# Patient Record
Sex: MALE | Race: WHITE | Employment: OTHER | URBAN - METROPOLITAN AREA
[De-identification: names, ages, dates, MRNs, and addresses within clinical notes are randomized per-mention and may not be internally consistent; named-entity substitution may affect disease eponyms.]

---

## 2017-01-03 ENCOUNTER — TELEPHONE (OUTPATIENT)
Dept: CARDIOLOGY CLINIC | Age: 62
End: 2017-01-03

## 2017-01-05 ENCOUNTER — OFFICE VISIT (OUTPATIENT)
Dept: CARDIOLOGY CLINIC | Age: 62
End: 2017-01-05

## 2017-01-05 ENCOUNTER — HOSPITAL ENCOUNTER (OUTPATIENT)
Dept: GENERAL RADIOLOGY | Age: 62
Discharge: HOME | End: 2017-01-05
Attending: NURSE PRACTITIONER | Admitting: NURSE PRACTITIONER

## 2017-01-05 VITALS
DIASTOLIC BLOOD PRESSURE: 84 MMHG | HEIGHT: 74 IN | WEIGHT: 315 LBS | HEART RATE: 108 BPM | BODY MASS INDEX: 40.43 KG/M2 | SYSTOLIC BLOOD PRESSURE: 112 MMHG

## 2017-01-05 DIAGNOSIS — I48.0 PAROXYSMAL ATRIAL FIBRILLATION (HCC): Primary | ICD-10-CM

## 2017-01-05 DIAGNOSIS — I10 ESSENTIAL HYPERTENSION: ICD-10-CM

## 2017-01-05 DIAGNOSIS — I48.0 PAROXYSMAL ATRIAL FIBRILLATION (HCC): ICD-10-CM

## 2017-01-05 LAB — TSH REFLEX: 0.87 UIU/ML (ref 0.27–4.2)

## 2017-01-05 PROCEDURE — 99214 OFFICE O/P EST MOD 30 MIN: CPT | Performed by: NURSE PRACTITIONER

## 2017-01-05 PROCEDURE — 93000 ELECTROCARDIOGRAM COMPLETE: CPT | Performed by: NURSE PRACTITIONER

## 2017-01-05 RX ORDER — AMIODARONE HYDROCHLORIDE 200 MG/1
200 TABLET ORAL 2 TIMES DAILY
Qty: 60 TABLET | Refills: 1 | Status: SHIPPED | OUTPATIENT
Start: 2017-01-05 | End: 2017-02-23 | Stop reason: DRUGHIGH

## 2017-01-09 ENCOUNTER — TELEPHONE (OUTPATIENT)
Dept: CARDIOLOGY CLINIC | Age: 62
End: 2017-01-09

## 2017-01-10 PROBLEM — I48.4 ATYPICAL ATRIAL FLUTTER (HCC): Status: ACTIVE | Noted: 2017-01-10

## 2017-01-19 DIAGNOSIS — E78.5 HYPERLIPIDEMIA, UNSPECIFIED HYPERLIPIDEMIA TYPE: Primary | ICD-10-CM

## 2017-01-19 RX ORDER — PRAVASTATIN SODIUM 40 MG
TABLET ORAL
Qty: 90 TABLET | Refills: 0 | Status: SHIPPED | OUTPATIENT
Start: 2017-01-19 | End: 2017-05-22 | Stop reason: SDUPTHER

## 2017-01-23 RX ORDER — HYDRALAZINE HYDROCHLORIDE 25 MG/1
TABLET, FILM COATED ORAL
Qty: 180 TABLET | Refills: 0 | Status: SHIPPED | OUTPATIENT
Start: 2017-01-23 | End: 2017-02-27 | Stop reason: SDUPTHER

## 2017-01-23 RX ORDER — FUROSEMIDE 40 MG/1
TABLET ORAL
Qty: 90 TABLET | Refills: 0 | Status: SHIPPED | OUTPATIENT
Start: 2017-01-23 | End: 2017-02-27 | Stop reason: SDUPTHER

## 2017-01-23 RX ORDER — ISOSORBIDE MONONITRATE 30 MG/1
TABLET, EXTENDED RELEASE ORAL
Qty: 90 TABLET | Refills: 0 | Status: SHIPPED | OUTPATIENT
Start: 2017-01-23 | End: 2017-02-27 | Stop reason: SDUPTHER

## 2017-02-15 RX ORDER — CARVEDILOL 6.25 MG/1
TABLET ORAL
Qty: 180 TABLET | Refills: 3 | Status: SHIPPED | OUTPATIENT
Start: 2017-02-15 | End: 2018-02-14 | Stop reason: SDUPTHER

## 2017-02-23 ENCOUNTER — OFFICE VISIT (OUTPATIENT)
Dept: CARDIOLOGY CLINIC | Age: 62
End: 2017-02-23

## 2017-02-23 VITALS
BODY MASS INDEX: 40.43 KG/M2 | HEIGHT: 74 IN | DIASTOLIC BLOOD PRESSURE: 70 MMHG | HEART RATE: 52 BPM | SYSTOLIC BLOOD PRESSURE: 118 MMHG | WEIGHT: 315 LBS | OXYGEN SATURATION: 97 %

## 2017-02-23 DIAGNOSIS — I10 ESSENTIAL HYPERTENSION: ICD-10-CM

## 2017-02-23 DIAGNOSIS — I48.4 ATYPICAL ATRIAL FLUTTER (HCC): ICD-10-CM

## 2017-02-23 DIAGNOSIS — I48.0 PAROXYSMAL ATRIAL FIBRILLATION (HCC): Primary | ICD-10-CM

## 2017-02-23 PROCEDURE — 99213 OFFICE O/P EST LOW 20 MIN: CPT | Performed by: NURSE PRACTITIONER

## 2017-02-23 PROCEDURE — 93000 ELECTROCARDIOGRAM COMPLETE: CPT | Performed by: NURSE PRACTITIONER

## 2017-02-23 RX ORDER — AMIODARONE HYDROCHLORIDE 200 MG/1
200 TABLET ORAL DAILY
Qty: 30 TABLET | Refills: 3 | Status: SHIPPED
Start: 2017-02-23 | End: 2017-02-27 | Stop reason: SDUPTHER

## 2017-02-28 RX ORDER — FUROSEMIDE 40 MG/1
TABLET ORAL
Qty: 90 TABLET | Refills: 1 | Status: SHIPPED | OUTPATIENT
Start: 2017-02-28 | End: 2017-10-15 | Stop reason: SDUPTHER

## 2017-02-28 RX ORDER — AMIODARONE HYDROCHLORIDE 200 MG/1
200 TABLET ORAL DAILY
Qty: 90 TABLET | Refills: 1 | Status: SHIPPED | OUTPATIENT
Start: 2017-02-28 | End: 2017-05-24 | Stop reason: SDUPTHER

## 2017-02-28 RX ORDER — ISOSORBIDE MONONITRATE 30 MG/1
TABLET, EXTENDED RELEASE ORAL
Qty: 90 TABLET | Refills: 1 | Status: SHIPPED | OUTPATIENT
Start: 2017-02-28 | End: 2017-10-15 | Stop reason: SDUPTHER

## 2017-02-28 RX ORDER — HYDRALAZINE HYDROCHLORIDE 25 MG/1
TABLET, FILM COATED ORAL
Qty: 180 TABLET | Refills: 1 | Status: SHIPPED | OUTPATIENT
Start: 2017-02-28 | End: 2017-10-22 | Stop reason: SDUPTHER

## 2017-02-28 RX ORDER — LISINOPRIL 20 MG/1
20 TABLET ORAL 2 TIMES DAILY
Qty: 180 TABLET | Refills: 1 | Status: SHIPPED | OUTPATIENT
Start: 2017-02-28 | End: 2017-07-15 | Stop reason: SDUPTHER

## 2017-05-22 RX ORDER — PRAVASTATIN SODIUM 40 MG
TABLET ORAL
Qty: 90 TABLET | Refills: 3 | Status: SHIPPED | OUTPATIENT
Start: 2017-05-22 | End: 2018-05-27 | Stop reason: SDUPTHER

## 2017-05-24 ENCOUNTER — OFFICE VISIT (OUTPATIENT)
Dept: CARDIOLOGY CLINIC | Age: 62
End: 2017-05-24

## 2017-05-24 VITALS
DIASTOLIC BLOOD PRESSURE: 78 MMHG | WEIGHT: 315 LBS | SYSTOLIC BLOOD PRESSURE: 126 MMHG | BODY MASS INDEX: 40.43 KG/M2 | HEIGHT: 74 IN | HEART RATE: 53 BPM

## 2017-05-24 DIAGNOSIS — I48.0 PAROXYSMAL ATRIAL FIBRILLATION (HCC): Primary | ICD-10-CM

## 2017-05-24 DIAGNOSIS — I10 ESSENTIAL HYPERTENSION: ICD-10-CM

## 2017-05-24 PROCEDURE — 99213 OFFICE O/P EST LOW 20 MIN: CPT | Performed by: NURSE PRACTITIONER

## 2017-05-24 PROCEDURE — 93000 ELECTROCARDIOGRAM COMPLETE: CPT | Performed by: NURSE PRACTITIONER

## 2017-05-24 RX ORDER — AMIODARONE HYDROCHLORIDE 200 MG/1
100 TABLET ORAL DAILY
Qty: 90 TABLET | Refills: 1
Start: 2017-05-24 | End: 2018-04-08 | Stop reason: SDUPTHER

## 2017-07-03 ENCOUNTER — OFFICE VISIT (OUTPATIENT)
Dept: PULMONOLOGY | Age: 62
End: 2017-07-03

## 2017-07-03 VITALS
DIASTOLIC BLOOD PRESSURE: 72 MMHG | HEIGHT: 74 IN | OXYGEN SATURATION: 96 % | BODY MASS INDEX: 40.43 KG/M2 | TEMPERATURE: 98.1 F | SYSTOLIC BLOOD PRESSURE: 112 MMHG | WEIGHT: 315 LBS | RESPIRATION RATE: 20 BRPM | HEART RATE: 62 BPM

## 2017-07-03 DIAGNOSIS — G47.33 SEVERE OBSTRUCTIVE SLEEP APNEA: Primary | ICD-10-CM

## 2017-07-03 DIAGNOSIS — E66.01 OBESITY, CLASS III, BMI 40-49.9 (MORBID OBESITY) (HCC): ICD-10-CM

## 2017-07-03 PROBLEM — E66.813 OBESITY, CLASS III, BMI 40-49.9 (MORBID OBESITY) (HCC): Status: ACTIVE | Noted: 2017-07-03

## 2017-07-03 PROCEDURE — 99213 OFFICE O/P EST LOW 20 MIN: CPT | Performed by: NURSE PRACTITIONER

## 2017-07-03 ASSESSMENT — SLEEP AND FATIGUE QUESTIONNAIRES
ESS TOTAL SCORE: 6
HOW LIKELY ARE YOU TO NOD OFF OR FALL ASLEEP WHEN YOU ARE A PASSENGER IN A CAR FOR AN HOUR WITHOUT A BREAK: 1
HOW LIKELY ARE YOU TO NOD OFF OR FALL ASLEEP WHILE LYING DOWN TO REST IN THE AFTERNOON WHEN CIRCUMSTANCES PERMIT: 1
HOW LIKELY ARE YOU TO NOD OFF OR FALL ASLEEP WHILE SITTING QUIETLY AFTER LUNCH WITHOUT ALCOHOL: 1
HOW LIKELY ARE YOU TO NOD OFF OR FALL ASLEEP IN A CAR, WHILE STOPPED FOR A FEW MINUTES IN TRAFFIC: 0
HOW LIKELY ARE YOU TO NOD OFF OR FALL ASLEEP WHILE SITTING AND TALKING TO SOMEONE: 0
HOW LIKELY ARE YOU TO NOD OFF OR FALL ASLEEP WHILE WATCHING TV: 2
NECK CIRCUMFERENCE (INCHES): 17
HOW LIKELY ARE YOU TO NOD OFF OR FALL ASLEEP WHILE SITTING AND READING: 1
HOW LIKELY ARE YOU TO NOD OFF OR FALL ASLEEP WHILE SITTING INACTIVE IN A PUBLIC PLACE: 0

## 2017-07-10 ENCOUNTER — HOSPITAL ENCOUNTER (OUTPATIENT)
Dept: OTHER | Age: 62
Discharge: HOME | End: 2017-07-10
Attending: NURSE PRACTITIONER | Admitting: NURSE PRACTITIONER

## 2017-07-10 LAB
ALBUMIN SERPL-MCNC: 4.1 G/DL (ref 3.4–5)
ALP BLD-CCNC: 60 U/L (ref 40–129)
ALT SERPL-CCNC: 15 U/L (ref 10–40)
ANION GAP SERPL CALCULATED.3IONS-SCNC: 10 MMOL/L (ref 3–16)
AST SERPL-CCNC: 12 U/L (ref 15–37)
BILIRUB SERPL-MCNC: 0.6 MG/DL (ref 0–1)
BILIRUBIN DIRECT: <0.2 MG/DL (ref 0–0.3)
BILIRUBIN, INDIRECT: ABNORMAL MG/DL (ref 0–1)
BUN BLDV-MCNC: 38 MG/DL (ref 7–20)
CALCIUM SERPL-MCNC: 9.4 MG/DL (ref 8.3–10.6)
CHLORIDE BLD-SCNC: 105 MMOL/L (ref 99–110)
CO2: 26 MMOL/L (ref 21–32)
CREAT SERPL-MCNC: 1.6 MG/DL (ref 0.8–1.3)
GFR AFRICAN AMERICAN: 53
GFR NON-AFRICAN AMERICAN: 44
GLUCOSE BLD-MCNC: 98 MG/DL (ref 70–99)
POTASSIUM SERPL-SCNC: 4.6 MMOL/L (ref 3.5–5.1)
SODIUM BLD-SCNC: 141 MMOL/L (ref 136–145)
TOTAL PROTEIN: 6.7 G/DL (ref 6.4–8.2)
TSH REFLEX: 1.62 UIU/ML (ref 0.27–4.2)

## 2017-11-30 ENCOUNTER — OFFICE VISIT (OUTPATIENT)
Dept: CARDIOLOGY CLINIC | Age: 62
End: 2017-11-30

## 2017-11-30 VITALS
BODY MASS INDEX: 40.43 KG/M2 | HEART RATE: 64 BPM | DIASTOLIC BLOOD PRESSURE: 78 MMHG | HEIGHT: 74 IN | WEIGHT: 315 LBS | SYSTOLIC BLOOD PRESSURE: 124 MMHG

## 2017-11-30 DIAGNOSIS — I48.0 PAROXYSMAL ATRIAL FIBRILLATION (HCC): Primary | ICD-10-CM

## 2017-11-30 PROCEDURE — 93000 ELECTROCARDIOGRAM COMPLETE: CPT | Performed by: INTERNAL MEDICINE

## 2017-11-30 PROCEDURE — 99214 OFFICE O/P EST MOD 30 MIN: CPT | Performed by: INTERNAL MEDICINE

## 2017-11-30 NOTE — LETTER
[address] [place] [address] Cardiology - [address] [place] [id number]  Phone: [phone]  Fax: [phone]    Lyn Sherman MD        December 5, 2017     [address] [place] [address]  [address]. [address]    Patient: Gen Beach  MR Number: [id number]  YOB: 1955  Date of Visit: 11/30/2017    Dear  [address] [place] [address]:    I recently saw our mutual patient, listed above. Below are the relevant portions of my assessment and plan of care. [address]   Electrophysiology Note      Reason for office visit: 6 month follow up; PAF     HISTORY OF PRESENT ILLNESS: Gen Beach is a 57 y.o. male who presents for follow up for Paroxysmal atrial fibrillation. He reported a cardioversion in 2007 for afib. He underwent successful cardioversion on 3/20/15. Repeat EKG on 4/16/15 showed afib. EKG in the office at that time showed afib, rate 80. He complained of palpitations and shortness of breath. He stated that he had sleep apnea. He underwent RFCA for afib on 08/11/15. At his 9/2015 office visit, he reported no irregular heart rhythm since ablation, but reported his AM medications caused fatigue. Medication changes were made at that time and he was instructed to follow up with Roseanna JENKINS.    Started Amiodarone 12/2016. DCCV 1/10/2017. Amiodarone was decreased due to bradycardia. Interval history since last office visit:   EKG from today shows sinus rhythm 63. He reports he has been feeling well. He feels he has maintained sinus rhythm since his latest cardioversion. He has been taking his medications as prescribed. He tolerates daily activity well. Patient currently denies any edema, palpitations, chest pain, shortness of breath, dizziness, and syncope.        Past Medical History:   has a past medical history of Atrial fibrillation ([address] CHF (congestive heart failure) ([address] CKD (chronic kidney disease) Allergies:  Review of patient's allergies indicates no known allergies. Social History:   reports that he has never smoked. He has never used smokeless tobacco. He reports that he does not drink alcohol or use drugs. Family History: family history includes Cancer in his father; Diabetes in his brother and mother; Heart Disease in his mother and sister; High Blood Pressure in his mother; Osteoarthritis in his mother. REVIEW OF SYSTEMS:  Unchanged since last visit   · Constitutional: there has been no unanticipated weight loss. There's been no change in energy level, sleep pattern, or activity level. · Eyes: No visual changes or diplopia. No scleral icterus. · ENT: No Headaches, hearing loss or vertigo. No mouth sores or sore throat. · Cardiovascular: No for chest pain, No for dyspnea on exertion, No for palpitations or No for loss of consciousness. No cough, hemoptysis, No for pleuritic pain, or phlebitis. · Respiratory: No for cough or wheezing, no sputum production. No hematemesis. · Gastrointestinal: No abdominal pain, appetite loss, blood in stools. No change in bowel or bladder habits. · Genitourinary: No dysuria, trouble voiding, or hematuria. · Musculoskeletal:  No gait disturbance, No for weakness or joint complaints. · Integumentary: No rash or pruritis. · Neurological: No headache, diplopia, change in muscle strength, numbness or tingling. No change in gait, balance, coordination, mood, affect, memory, mentation, behavior. · Psychiatric: No anxiety, or depression. · Endocrine: No temperature intolerance. No excessive thirst, fluid intake, or urination. No tremor. · Hematologic/Lymphatic: No abnormal bruising or bleeding, blood clots or swollen lymph nodes. · Allergic/Immunologic: No nasal congestion or hives.     Physical Examination:  Unchanged since last visit   /78   Pulse 64   Ht 6' 2\" (1.88 m)   Wt (!) 336 lb (152.4 kg)   BMI 43.14 kg/m² Constitutional and General Appearance: alert, cooperative, no distress and appears stated age  [de-identified]: PERRL, no cervical lymphadenopathy. No masses palpable. Normal oral mucosa  Respiratory:  · Normal excursion and expansion without use of accessory muscles  · Resp Auscultation: Normal breath sounds without dullness or wheezing  Cardiovascular:  · The apical impulse is not displaced  · Heart tones are crisp and normal. regular S1 and S2.  · Jugular venous pulsation Normal  · The carotid upstroke is normal in amplitude and contour without delay or bruit  · Peripheral pulses are symmetrical and full  Abdomen:  · No masses or tenderness  · Bowel sounds present  Extremities:  ·  No Cyanosis or Clubbing  ·  Lower extremity edema: No  · Skin: Warm and dry  Neurological:  · Alert and oriented. · Moves all extremities well  · No abnormalities of mood, affect, memory, mentation, or behavior are noted      DATA:    EC17 Sinus rhythm 63  RCFA: 2015   CONCLUSION:    1. Complex electrophysiology study with normal baseline conduction parameters. 2. Status post persistent atrial fibrillation ablation. PLAN:    1. Routine post ablation care. 2. Continue current medications. 3. Limited 2D echo in the morning. EC/4/15 Atrial fibrillation, ABNORMAL RHYTHM, rate 80  ECHO: 2/24/15  Technically difficult study due to body habitus. Patient is in atrial fibrillations with controlled ventricular response during this study. Overall left ventricular function is normal.  Ejection fraction is visually estimated to be 55 %. Left ventricle size is normal.  Moderate concentric left ventricular hypertrophy is present. Normal diastolic function. Mitral valve is structurally normal.  Trivial mitral regurgitation is present. The left atrium is normal in size. The aortic valve is normal in structure and function. There is no significant aortic regurgitation. The aortic root is enlarged ( 3.8cm) in size. IVC size is normal (<2.1 cm) and collapses > 50% with respiration consistent with normal RA pressure (3 mmHg). No obvious masses, thrombi, or vegetations are noted. IMPRESSION:    1. Paroxysmal atrial fibrillation (HCC)     2. H/o radiofrequency catheter ablation       RECOMMENDATIONS:  1. Continue current medications. 2. The patient is asked to make an attempt to improve diet and exercise patterns to aid in medical management of this problem. 3. Follow up with Aquiles Bender CNP in 6 months. 4. Follow up with me as needed. QUALITY MEASURES  1. Tobacco Cessation Counseling: NA  2. Retake of BP if >140/90:   NA  3. Documentation to PCP/referring for new patient:  Sent to PCP at close of office visit  4. CAD patient on anti-platelet: NA  5. CAD patient on STATIN therapy:  Pravastatin  6. Patient with CHF and aFib on anticoagulation:  Yes Xarelto      All questions and concerns were addressed to the patient/family. Alternatives to my treatment were discussed. EARL WhiteC. Electrophysiology  [facility]. [address] [place] [address]. [address]. [place], [address]  Phone: [phone]  Fax: [phone]   11/30/2017    If you have questions, please do not hesitate to call me. I look forward to following Rachid Freedman along with you.     Sincerely,        Ivet Zhang MD

## 2017-11-30 NOTE — PROGRESS NOTES
[facility]   Electrophysiology Note      Reason for office visit: 6 month follow up; PAF     HISTORY OF PRESENT ILLNESS: Flynn Jiang is a 57 y.o. male who presents for follow up for Paroxysmal atrial fibrillation. He reported a cardioversion in 2007 for afib. He underwent successful cardioversion on 3/20/15. Repeat EKG on 4/16/15 showed afib. EKG in the office at that time showed afib, rate 80. He complained of palpitations and shortness of breath. He stated that he had sleep apnea. He underwent RFCA for afib on 08/11/15. At his 9/2015 office visit, he reported no irregular heart rhythm since ablation, but reported his AM medications caused fatigue. Medication changes were made at that time and he was instructed to follow up with Roseanna JENKINS.    Started Amiodarone 12/2016. DCCV 1/10/2017. Amiodarone was decreased due to bradycardia. Interval history since last office visit:   EKG from today shows sinus rhythm 63. He reports he has been feeling well. He feels he has maintained sinus rhythm since his latest cardioversion. He has been taking his medications as prescribed. He tolerates daily activity well. Patient currently denies any edema, palpitations, chest pain, shortness of breath, dizziness, and syncope. Past Medical History:   has a past medical history of Atrial fibrillation ([address] CHF (congestive heart failure) ([facility] CKD (chronic kidney disease) stage 3, GFR 30-59 ml/min; Family history of early CAD; Hyperlipidemia; Hypertension; JESSICA (obstructive sleep apnea); and S/P ablation of atrial fibrillation. Past Surgical History:   has a past surgical history that includes back surgery; hernia repair; Knee arthroscopy (3/20/2012); Cardioversion (3/20/2015); transesophageal echocardiogram (8/11/2015); ablation of dysrhythmic focus (8/11/2015); Atrial ablation surgery (8/11/15); Cardioversion (01/10/2017); and transesophageal echocardiogram (01/10/2017).      Home Medications:    Prior to Admission medications    Medication Sig Start Date End Date Taking? Authorizing Provider   amiodarone (CORDARONE) 200 MG tablet Take 0.5 tablets by mouth daily 10/23/17  Yes Willian Melton CNP   hydrALAZINE (APRESOLINE) 25 MG tablet TAKE 1 TABLET BY MOUTH  TWICE A DAY 10/23/17  Yes Willian Melton CNP   furosemide (LASIX) 40 MG tablet TAKE 1 TABLET BY MOUTH  EVERY DAY IN THE MORNING 10/19/17  Yes Mika Burgos MD   isosorbide mononitrate (IMDUR) 30 MG extended release tablet TAKE 1 TABLET BY MOUTH  EVERY DAY 10/19/17  Yes Mika Burgos MD   eplerenone (INSPRA) 25 MG tablet Take 1 tablet by mouth  daily 8/28/17  Yes Mika Burgos MD   lisinopril (PRINIVIL;ZESTRIL) 20 MG tablet Take 1 tablet by mouth two  times daily 7/17/17  Yes Willian Melton CNP   rivaroxaban (XARELTO) 20 MG TABS tablet TAKE 1 TABLET EVERY DAY 7/17/17  Yes Mika Burgos MD   amiodarone (CORDARONE) 200 MG tablet Take 0.5 tablets by mouth daily  Patient taking differently: Take 100 mg by mouth 2 times daily  5/24/17  Yes Willian Melton CNP   pravastatin (PRAVACHOL) 40 MG tablet Take 1 tablet by mouth  daily (Needs labs) 5/22/17  Yes Roseanne Ferrari MD   carvedilol (COREG) 6.25 MG tablet TAKE 1 TABLET BY MOUTH WITH MEALS TWICE A DAY 2/15/17  Yes Roseanne Ferrari MD   XARELTO 20 MG TABS tablet TAKE 1 TABLET EVERY DAY 7/19/17   Willian Melton CNP       Allergies:  Review of patient's allergies indicates no known allergies. Social History:   reports that he has never smoked. He has never used smokeless tobacco. He reports that he does not drink alcohol or use drugs. Family History: family history includes Cancer in his father; Diabetes in his brother and mother; Heart Disease in his mother and sister; High Blood Pressure in his mother; Osteoarthritis in his mother. REVIEW OF SYSTEMS:  Unchanged since last visit   · Constitutional: there has been no unanticipated weight loss.  There's been no change in energy level, sleep pattern, or activity level. · Eyes: No visual changes or diplopia. No scleral icterus. · ENT: No Headaches, hearing loss or vertigo. No mouth sores or sore throat. · Cardiovascular: No for chest pain, No for dyspnea on exertion, No for palpitations or No for loss of consciousness. No cough, hemoptysis, No for pleuritic pain, or phlebitis. · Respiratory: No for cough or wheezing, no sputum production. No hematemesis. · Gastrointestinal: No abdominal pain, appetite loss, blood in stools. No change in bowel or bladder habits. · Genitourinary: No dysuria, trouble voiding, or hematuria. · Musculoskeletal:  No gait disturbance, No for weakness or joint complaints. · Integumentary: No rash or pruritis. · Neurological: No headache, diplopia, change in muscle strength, numbness or tingling. No change in gait, balance, coordination, mood, affect, memory, mentation, behavior. · Psychiatric: No anxiety, or depression. · Endocrine: No temperature intolerance. No excessive thirst, fluid intake, or urination. No tremor. · Hematologic/Lymphatic: No abnormal bruising or bleeding, blood clots or swollen lymph nodes. · Allergic/Immunologic: No nasal congestion or hives. Physical Examination:  Unchanged since last visit   /78   Pulse 64   Ht 6' 2\" (1.88 m)   Wt (!) 336 lb (152.4 kg)   BMI 43.14 kg/m²    Constitutional and General Appearance: alert, cooperative, no distress and appears stated age  HEENT: PERRL, no cervical lymphadenopathy. No masses palpable.  Normal oral mucosa  Respiratory:  · Normal excursion and expansion without use of accessory muscles  · Resp Auscultation: Normal breath sounds without dullness or wheezing  Cardiovascular:  · The apical impulse is not displaced  · Heart tones are crisp and normal. regular S1 and S2.  · Jugular venous pulsation Normal  · The carotid upstroke is normal in amplitude and contour without delay or bruit  · Peripheral pulses are symmetrical and

## 2017-12-05 NOTE — COMMUNICATION BODY
sleep pattern, or activity level. · Eyes: No visual changes or diplopia. No scleral icterus. · ENT: No Headaches, hearing loss or vertigo. No mouth sores or sore throat. · Cardiovascular: No for chest pain, No for dyspnea on exertion, No for palpitations or No for loss of consciousness. No cough, hemoptysis, No for pleuritic pain, or phlebitis. · Respiratory: No for cough or wheezing, no sputum production. No hematemesis. · Gastrointestinal: No abdominal pain, appetite loss, blood in stools. No change in bowel or bladder habits. · Genitourinary: No dysuria, trouble voiding, or hematuria. · Musculoskeletal:  No gait disturbance, No for weakness or joint complaints. · Integumentary: No rash or pruritis. · Neurological: No headache, diplopia, change in muscle strength, numbness or tingling. No change in gait, balance, coordination, mood, affect, memory, mentation, behavior. · Psychiatric: No anxiety, or depression. · Endocrine: No temperature intolerance. No excessive thirst, fluid intake, or urination. No tremor. · Hematologic/Lymphatic: No abnormal bruising or bleeding, blood clots or swollen lymph nodes. · Allergic/Immunologic: No nasal congestion or hives. Physical Examination:  Unchanged since last visit   /78   Pulse 64   Ht 6' 2\" (1.88 m)   Wt (!) 336 lb (152.4 kg)   BMI 43.14 kg/m²     Constitutional and General Appearance: alert, cooperative, no distress and appears stated age  HEENT: PERRL, no cervical lymphadenopathy. No masses palpable.  Normal oral mucosa  Respiratory:  · Normal excursion and expansion without use of accessory muscles  · Resp Auscultation: Normal breath sounds without dullness or wheezing  Cardiovascular:  · The apical impulse is not displaced  · Heart tones are crisp and normal. regular S1 and S2.  · Jugular venous pulsation Normal  · The carotid upstroke is normal in amplitude and contour without delay or bruit  · Peripheral pulses are symmetrical and Retake of BP if >140/90:   NA  3. Documentation to PCP/referring for new patient:  Sent to PCP at close of office visit  4. CAD patient on anti-platelet: NA  5. CAD patient on STATIN therapy:  Pravastatin  6. Patient with CHF and aFib on anticoagulation:  Yes Xarelto      All questions and concerns were addressed to the patient/family. Alternatives to my treatment were discussed. VAN Garcia F.A.C.C. Electrophysiology  [facility]. [address] [place] [address]. [address].   [place] [address]  Phone: [phone]  Fax: [phone]   11/30/2017

## 2018-01-09 DIAGNOSIS — Z79.899 MEDICATION MANAGEMENT: Primary | ICD-10-CM

## 2018-01-09 RX ORDER — ISOSORBIDE MONONITRATE 30 MG/1
TABLET, EXTENDED RELEASE ORAL
Qty: 90 TABLET | Refills: 3 | Status: SHIPPED | OUTPATIENT
Start: 2018-01-09 | End: 2018-11-13 | Stop reason: SDUPTHER

## 2018-01-09 RX ORDER — FUROSEMIDE 40 MG/1
TABLET ORAL
Qty: 90 TABLET | Refills: 1 | Status: SHIPPED | OUTPATIENT
Start: 2018-01-09 | End: 2018-05-30 | Stop reason: SDUPTHER

## 2018-01-31 ENCOUNTER — OFFICE VISIT (OUTPATIENT)
Dept: FAMILY MEDICINE CLINIC | Age: 63
End: 2018-01-31

## 2018-01-31 VITALS
OXYGEN SATURATION: 98 % | HEART RATE: 64 BPM | WEIGHT: 315 LBS | DIASTOLIC BLOOD PRESSURE: 78 MMHG | SYSTOLIC BLOOD PRESSURE: 138 MMHG | BODY MASS INDEX: 40.43 KG/M2 | HEIGHT: 74 IN

## 2018-01-31 DIAGNOSIS — I10 ESSENTIAL HYPERTENSION: Primary | ICD-10-CM

## 2018-01-31 DIAGNOSIS — Z12.11 SCREENING FOR COLON CANCER: ICD-10-CM

## 2018-01-31 DIAGNOSIS — N18.30 CKD (CHRONIC KIDNEY DISEASE) STAGE 3, GFR 30-59 ML/MIN (HCC): ICD-10-CM

## 2018-01-31 DIAGNOSIS — Z11.4 ENCOUNTER FOR SCREENING FOR HIV: ICD-10-CM

## 2018-01-31 DIAGNOSIS — Z11.59 NEED FOR HEPATITIS C SCREENING TEST: ICD-10-CM

## 2018-01-31 DIAGNOSIS — I48.0 PAROXYSMAL ATRIAL FIBRILLATION (HCC): ICD-10-CM

## 2018-01-31 DIAGNOSIS — J30.9 ACUTE ALLERGIC RHINITIS, UNSPECIFIED SEASONALITY, UNSPECIFIED TRIGGER: ICD-10-CM

## 2018-01-31 PROCEDURE — 99214 OFFICE O/P EST MOD 30 MIN: CPT | Performed by: FAMILY MEDICINE

## 2018-01-31 NOTE — PROGRESS NOTES
Darian Obrien is a 57 y.o. male    Chief Complaint   Patient presents with    [facility]     Runny nose. htn, Congestive heart failure, sleep apnea, ckd,       HPI:    This is a new patient to me. Hypertension   This is a chronic problem. The current episode started more than 1 year ago. The problem is unchanged. The problem is controlled. Risk factors for coronary artery disease include male gender and obesity. Past treatments include beta blockers, ACE inhibitors, diuretics and direct vasodilators. The current treatment provides significant improvement. There are no compliance problems. Hypertensive end-organ damage includes kidney disease. There is no history of CAD/MI or CVA. Runny nose. Present for a week. He feels his sinus dripping in the back. Has JESSICA and uses CPAP. Uses distilled water. Atrial fibrillation, paroxysmal. Had a recent cardioversion. Still on Amiodarone, Xarleto. CHF, systolic. This is a chronic condition. Leg edema has improved since his cardioversion/ablation. Takes Lasix and Eplerenone in the morning. CKD. Patient sees Nephrology. 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 Echo CHARLY 1/17:   Summary   Ejection fraction is visually estimated to be 45-50 %.   Trivial mitral and tricuspid regurgitation.   No HARPREET clot   Compared to previous study from 8- ejection fraction remains   unchanged.       ROS:    Review of Systems   HENT: Positive for congestion. Cardiovascular: Negative for leg swelling. /78 (Site: Left Arm, Position: Sitting, Cuff Size: Large Adult)   Pulse 64   Ht 6' 2\" (1.88 m)   Wt (!) 331 lb (150.1 kg)   SpO2 98%   BMI 42.50 kg/m²     Physical Exam:    Physical Exam   Constitutional: He appears well-developed. No distress. HENT:   Nose: Rhinorrhea present. Mouth/Throat: No posterior oropharyngeal edema or posterior oropharyngeal erythema. Neck: Normal range of motion. Neck supple.    Cardiovascular: Normal rate

## 2018-02-02 ENCOUNTER — HOSPITAL ENCOUNTER (OUTPATIENT)
Dept: OTHER | Age: 63
Discharge: HOME | End: 2018-02-02
Attending: INTERNAL MEDICINE | Admitting: INTERNAL MEDICINE

## 2018-02-02 ENCOUNTER — HOSPITAL ENCOUNTER (OUTPATIENT)
Dept: OTHER | Age: 63
Discharge: HOME | End: 2018-02-02
Attending: FAMILY MEDICINE | Admitting: FAMILY MEDICINE

## 2018-02-02 LAB
ANION GAP SERPL CALCULATED.3IONS-SCNC: 10 MMOL/L (ref 3–16)
BUN BLDV-MCNC: 30 MG/DL (ref 7–20)
CALCIUM SERPL-MCNC: 9 MG/DL (ref 8.3–10.6)
CHLORIDE BLD-SCNC: 105 MMOL/L (ref 99–110)
CO2: 27 MMOL/L (ref 21–32)
CREAT SERPL-MCNC: 1.6 MG/DL (ref 0.8–1.3)
GFR AFRICAN AMERICAN: 53
GFR NON-AFRICAN AMERICAN: 44
GLUCOSE BLD-MCNC: 101 MG/DL (ref 70–99)
HEPATITIS C ANTIBODY INTERPRETATION: NORMAL
POTASSIUM SERPL-SCNC: 4.6 MMOL/L (ref 3.5–5.1)
SODIUM BLD-SCNC: 142 MMOL/L (ref 136–145)

## 2018-02-05 LAB
HIV AG/AB: NORMAL
HIV ANTIGEN: NORMAL
HIV-1 ANTIBODY: NORMAL
HIV-2 AB: NORMAL

## 2018-04-03 DIAGNOSIS — Z79.899 MEDICATION MANAGEMENT: Primary | ICD-10-CM

## 2018-04-04 ENCOUNTER — HOSPITAL ENCOUNTER (OUTPATIENT)
Dept: OTHER | Age: 63
Discharge: HOME | End: 2018-04-04
Attending: NURSE PRACTITIONER | Admitting: NURSE PRACTITIONER

## 2018-04-05 LAB
HCT VFR BLD CALC: 41.3 % (ref 40.5–52.5)
HEMOGLOBIN: 13.8 G/DL (ref 13.5–17.5)
MCH RBC QN AUTO: 30.3 PG (ref 26–34)
MCHC RBC AUTO-ENTMCNC: 33.4 G/DL (ref 31–36)
MCV RBC AUTO: 90.6 FL (ref 80–100)
PDW BLD-RTO: 13.6 % (ref 12.4–15.4)
PLATELET # BLD: 211 K/UL (ref 135–450)
PMV BLD AUTO: 8.9 FL (ref 5–10.5)
RBC # BLD: 4.56 M/UL (ref 4.2–5.9)
WBC # BLD: 9.3 K/UL (ref 4–11)

## 2018-04-11 ENCOUNTER — HOSPITAL ENCOUNTER (OUTPATIENT)
Dept: OTHER | Age: 63
Discharge: HOME | End: 2018-04-11
Attending: NURSE PRACTITIONER | Admitting: NURSE PRACTITIONER

## 2018-04-11 LAB
ALBUMIN SERPL-MCNC: 4.1 G/DL (ref 3.4–5)
ALP BLD-CCNC: 59 U/L (ref 40–129)
ALT SERPL-CCNC: 19 U/L (ref 10–40)
ANION GAP SERPL CALCULATED.3IONS-SCNC: 12 MMOL/L (ref 3–16)
AST SERPL-CCNC: 14 U/L (ref 15–37)
BILIRUB SERPL-MCNC: 0.7 MG/DL (ref 0–1)
BILIRUBIN DIRECT: <0.2 MG/DL (ref 0–0.3)
BILIRUBIN, INDIRECT: ABNORMAL MG/DL (ref 0–1)
BUN BLDV-MCNC: 30 MG/DL (ref 7–20)
CALCIUM SERPL-MCNC: 9.1 MG/DL (ref 8.3–10.6)
CHLORIDE BLD-SCNC: 101 MMOL/L (ref 99–110)
CO2: 27 MMOL/L (ref 21–32)
CREAT SERPL-MCNC: 1.4 MG/DL (ref 0.8–1.3)
GFR AFRICAN AMERICAN: >60
GFR NON-AFRICAN AMERICAN: 51
GLUCOSE BLD-MCNC: 98 MG/DL (ref 70–99)
POTASSIUM SERPL-SCNC: 4.5 MMOL/L (ref 3.5–5.1)
SODIUM BLD-SCNC: 140 MMOL/L (ref 136–145)
TOTAL PROTEIN: 6.6 G/DL (ref 6.4–8.2)
TSH REFLEX FT4: 1.18 UIU/ML (ref 0.27–4.2)

## 2018-05-29 RX ORDER — PRAVASTATIN SODIUM 40 MG
TABLET ORAL
Qty: 90 TABLET | Refills: 3 | Status: SHIPPED | OUTPATIENT
Start: 2018-05-29 | End: 2019-07-09 | Stop reason: SDUPTHER

## 2018-05-30 ENCOUNTER — OFFICE VISIT (OUTPATIENT)
Dept: CARDIOLOGY CLINIC | Age: 63
End: 2018-05-30

## 2018-05-30 VITALS
DIASTOLIC BLOOD PRESSURE: 68 MMHG | SYSTOLIC BLOOD PRESSURE: 126 MMHG | HEART RATE: 55 BPM | OXYGEN SATURATION: 96 % | WEIGHT: 315 LBS | HEIGHT: 74 IN | BODY MASS INDEX: 40.43 KG/M2

## 2018-05-30 DIAGNOSIS — I10 ESSENTIAL HYPERTENSION: ICD-10-CM

## 2018-05-30 DIAGNOSIS — I48.0 PAROXYSMAL ATRIAL FIBRILLATION (HCC): Primary | ICD-10-CM

## 2018-05-30 PROCEDURE — 99213 OFFICE O/P EST LOW 20 MIN: CPT | Performed by: NURSE PRACTITIONER

## 2018-05-30 PROCEDURE — 93000 ELECTROCARDIOGRAM COMPLETE: CPT | Performed by: NURSE PRACTITIONER

## 2018-05-30 RX ORDER — FUROSEMIDE 40 MG/1
TABLET ORAL
Qty: 90 TABLET | Refills: 3 | Status: SHIPPED | OUTPATIENT
Start: 2018-05-30 | End: 2019-07-08 | Stop reason: SDUPTHER

## 2018-05-30 RX ORDER — LISINOPRIL 20 MG/1
TABLET ORAL
Qty: 180 TABLET | Refills: 3 | Status: SHIPPED | OUTPATIENT
Start: 2018-05-30 | End: 2019-07-08 | Stop reason: SDUPTHER

## 2018-07-02 ENCOUNTER — OFFICE VISIT (OUTPATIENT)
Dept: PULMONOLOGY | Age: 63
End: 2018-07-02

## 2018-07-02 VITALS
SYSTOLIC BLOOD PRESSURE: 138 MMHG | RESPIRATION RATE: 16 BRPM | DIASTOLIC BLOOD PRESSURE: 66 MMHG | BODY MASS INDEX: 40.43 KG/M2 | WEIGHT: 315 LBS | HEIGHT: 74 IN | HEART RATE: 65 BPM | OXYGEN SATURATION: 98 % | TEMPERATURE: 97.8 F

## 2018-07-02 DIAGNOSIS — E66.01 OBESITY, CLASS III, BMI 40-49.9 (MORBID OBESITY) (HCC): ICD-10-CM

## 2018-07-02 DIAGNOSIS — G47.33 SEVERE OBSTRUCTIVE SLEEP APNEA: Primary | ICD-10-CM

## 2018-07-02 DIAGNOSIS — Z71.89 CPAP USE COUNSELING: ICD-10-CM

## 2018-07-02 PROCEDURE — 99213 OFFICE O/P EST LOW 20 MIN: CPT | Performed by: NURSE PRACTITIONER

## 2018-07-02 ASSESSMENT — SLEEP AND FATIGUE QUESTIONNAIRES
HOW LIKELY ARE YOU TO NOD OFF OR FALL ASLEEP WHILE SITTING AND READING: 1
HOW LIKELY ARE YOU TO NOD OFF OR FALL ASLEEP WHILE SITTING AND READING: 2
HOW LIKELY ARE YOU TO NOD OFF OR FALL ASLEEP WHILE WATCHING TV: 2
HOW LIKELY ARE YOU TO NOD OFF OR FALL ASLEEP WHILE LYING DOWN TO REST IN THE AFTERNOON WHEN CIRCUMSTANCES PERMIT: 1
HOW LIKELY ARE YOU TO NOD OFF OR FALL ASLEEP WHILE SITTING INACTIVE IN A PUBLIC PLACE: 0
HOW LIKELY ARE YOU TO NOD OFF OR FALL ASLEEP IN A CAR, WHILE STOPPED FOR A FEW MINUTES IN TRAFFIC: 0
HOW LIKELY ARE YOU TO NOD OFF OR FALL ASLEEP WHILE SITTING AND TALKING TO SOMEONE: 0
ESS TOTAL SCORE: 13
HOW LIKELY ARE YOU TO NOD OFF OR FALL ASLEEP WHILE SITTING QUIETLY AFTER LUNCH WITHOUT ALCOHOL: 1
HOW LIKELY ARE YOU TO NOD OFF OR FALL ASLEEP WHEN YOU ARE A PASSENGER IN A CAR FOR AN HOUR WITHOUT A BREAK: 3
HOW LIKELY ARE YOU TO NOD OFF OR FALL ASLEEP WHILE WATCHING TV: 2
HOW LIKELY ARE YOU TO NOD OFF OR FALL ASLEEP IN A CAR, WHILE STOPPED FOR A FEW MINUTES IN TRAFFIC: 0
HOW LIKELY ARE YOU TO NOD OFF OR FALL ASLEEP WHILE SITTING QUIETLY AFTER LUNCH WITHOUT ALCOHOL: 2
HOW LIKELY ARE YOU TO NOD OFF OR FALL ASLEEP WHILE SITTING AND TALKING TO SOMEONE: 0
ESS TOTAL SCORE: 6
HOW LIKELY ARE YOU TO NOD OFF OR FALL ASLEEP WHILE SITTING INACTIVE IN A PUBLIC PLACE: 1
NECK CIRCUMFERENCE (INCHES): 17.5
HOW LIKELY ARE YOU TO NOD OFF OR FALL ASLEEP WHEN YOU ARE A PASSENGER IN A CAR FOR AN HOUR WITHOUT A BREAK: 1
HOW LIKELY ARE YOU TO NOD OFF OR FALL ASLEEP WHILE LYING DOWN TO REST IN THE AFTERNOON WHEN CIRCUMSTANCES PERMIT: 3

## 2018-07-02 NOTE — PATIENT INSTRUCTIONS
Please keep all of your future appointments scheduled by [facility] - [facility] Pulmonary office. Sleep Hygiene. .. Tips for better sleep. .. Avoid naps. This will ensure you are sleepy at bedtime. If you have to take a nap, sleep less than 1 hour, before 3 pm.  Sleep only when sleepy; this reduces the time you are awake in bed. Regular exercise is recommended to help you deepen your sleep, but not within 4-6 hours of your bedtime. Timing of exercise is important, aim to exercise early in the morning or early afternoon. A light snack may help you fall asleep. Warm milk and foods high in the amino acid tryptophan, such as bananas, may help you to sleep  Be sure to avoid heavy, spicy or sugary foods 4-6 hours before bedtime and avoid at snack time. Stay away from stimulants such as caffeine and nicotine for at least 4-6 hours before bed. Stimulants can interfere with your ability to fall asleep. Caffeine is found in tea, cola, coffee, cocoa and chocolate and is best avoided at bedtime. Nicotine is found in tobacco products. Avoid alcohol 4-6 hours before bedtime. Alcohol has an immediate sleep-inducing effect, after a few hours when alcohol levels fall there is a stimulant or wake-up effect and will cause fragmented sleep. Sleep rituals are important. Give your body clues it is time to slow down and sleep. Examples include; yoga, deep breathing, listen to relaxing music, a hot bath or a few minutes of reading. Have a fixed bedtime and awakening time, Even on weekends! You will feel better keeping a regular sleep cycle, even if you are retired or not working. Get into your favorite sleep position. If not asleep in 30 minutes, get up and do something boring until you feel sleepy. Remember not to expose yourself to bright lights such as TV, phone or tablet screens. Only use your bed for sleeping. Do not use your bed as an office, workroom or recreation room. Use comfortable bedding. Uncomfortable bedding can prevent good sleep. Ensure your bedroom is quiet and comfortable. A cooler room along with enough blankets to stay warm is recommended. If your room is too noisy, try a white noise machine. If too bright, try black out shades or an eye mask. Dont take worries to bed. Leave worries about work, school etc. behind you when you go to bed. Some people find it helpful to assign a worry period in the evening or late afternoon to write down your worries and get them out of your system. CPAP Equipment Cleaning and Disinfecting Schedule  Equipment Cleaning Frequency Instructions  Disinfecting Frequency   Non-Disposable Filters  Weekly Mild soapy water, Rinse, Air Dry Not Required   Disposable Filters Change as needed  2-4 weeks Do Not Wash Not Required   Hose/tubing Daily Mild soapy water, Rinse, Air Dry Once a week   Mask / Nasal Pillows Daily Mild soapy water, Rinse, Air Dry Once a week   Headgear Weekly Hand wash, Mild soapy water, Rinse, Dry  Not Required   Humidifier Daily Empty water daily  Mild soapy water, Rinse well, Air Dry  Once a week   CPAP Unit As Needed Dust with damp cloth,  No detergents or sprays Not Required         Disinfect (per schedule) with 1 part white vinegar and 3 parts water- soak mask and water chamber for 30 minutes every 1-2 weeks, more often if sick. Allow water/vinegar mixture to run through tubing. Allow all equipment to air dry. Drying Hints:   Always hang tubing away from direct sunlight, as this will cause the tubing to become yellow, brittle and crack over a period of time. DO NOT attach the wet tubing to your CPAP unit to blow-dry it. The moisture from the tubing can drain back into your machine. Moisture in your unit can cause sudden pressure increases or short circuits  DO's and DON'Ts:  - Don't use alcohol-based products to clean your mask, because it can cause the materials to become hard and brittle.    - Don't put headgear in the washer or

## 2018-07-02 NOTE — PROGRESS NOTES
MA Communication: The following orders are received by verbal communication from GABRIELA Jones.     Orders include:  Order faxed to [id number]       1 year fu scheduled 7/1/19

## 2018-07-02 NOTE — PROGRESS NOTES
Subjective:      Patient ID: Bijan Jha is a 70 y.o. male. HPI   Bijan Jha is a 70 y.o. male in office for JESSICA follow up. Patient is using CPAP 7 hrs/night. Using humidifier. No snoring on CPAP. The pressure is well tolerated. The mask is comfortable-nasal mask. No mask leak. No significant daytime sleepiness. No nodding off when driving. No dry nose or throat. No fatigue. Bedtime is 10 pm and rise time is 445 am. Sleep onset is few minutes. Wakes up 1-2 times at night total. 1-2 nocturia. It takes few minutes to fall back a sleep. No naps during the day. No headache in am. No weight gain. 0-1 caffienated beverages during the day. No alcohol. ESS is 6.     Past Medical History:   Diagnosis Date    Atrial fibrillation (HCC)     CHF (congestive heart failure) (HCC)     CKD (chronic kidney disease) stage 3, GFR 30-59 ml/min     Family history of early CAD     Hyperlipidemia     Hypertension     JESSICA (obstructive sleep apnea)     S/P ablation of atrial fibrillation 8/11/2015    RFCA       Past Surgical History:   Procedure Laterality Date    ABLATION OF DYSRHYTHMIC FOCUS  8/11/2015    ATRIAL ABLATION SURGERY  8/11/15    CHARLY/Afib Ablation    BACK SURGERY      CARDIOVERSION  3/20/2015    Atrial Fib to SR    CARDIOVERSION  01/10/2017    A Fib- SR    HERNIA REPAIR      KNEE ARTHROSCOPY  3/20/2012    Left    TRANSESOPHAGEAL ECHOCARDIOGRAM  8/11/2015    TRANSESOPHAGEAL ECHOCARDIOGRAM  01/10/2017       Current Medications:    Current Outpatient Prescriptions:     rivaroxaban (XARELTO) 20 MG TABS tablet, TAKE 1 TABLET EVERY DAY, Disp: 90 tablet, Rfl: 3    furosemide (LASIX) 40 MG tablet, TAKE 1 TABLET BY MOUTH  DAILY IN THE MORNING, Disp: 90 tablet, Rfl: 3    lisinopril (PRINIVIL;ZESTRIL) 20 MG tablet, TAKE 1 TABLET BY MOUTH TWO  TIMES DAILY, Disp: 180 tablet, Rfl: 3    pravastatin (PRAVACHOL) 40 MG tablet, TAKE 1 TABLET BY MOUTH  DAILY, Disp: 90 tablet, Rfl: 3    hydrALAZINE (APRESOLINE) 25 MG tablet, TAKE 1 TABLET BY MOUTH  TWICE A DAY, Disp: 180 tablet, Rfl: 1    amiodarone (CORDARONE) 200 MG tablet, TAKE ONE-HALF TABLET BY  MOUTH DAILY, Disp: 45 tablet, Rfl: 1    carvedilol (COREG) 6.25 MG tablet, TAKE 1 TABLET BY MOUTH  TWICE A DAY WITH MEALS, Disp: 180 tablet, Rfl: 2    isosorbide mononitrate (IMDUR) 30 MG extended release tablet, TAKE 1 TABLET BY MOUTH  EVERY DAY, Disp: 90 tablet, Rfl: 3    eplerenone (INSPRA) 25 MG tablet, TAKE 1 TABLET BY MOUTH  DAILY, Disp: 90 tablet, Rfl: 3      Review of Systems      Objective:   Physical Exam  Blood pressure 138/66, pulse 65, temperature 97.8 °F (36.6 °C), resp. rate 16, height 6' 2\" (1.88 m), weight (!) 333 lb (151 kg), SpO2 98 %.' on RA    Gen: No acute distress. Obese. BMI of 42.75  Eyes: PERRL. No sclera icterus. No conjunctival injection. ENT: No discharge. Pharynx clear. Mallampati class IV. Large tongue   Neck: Trachea midline. No obvious mass. Neck circumference 17.5\"  Resp: No accessory muscle use. No crackles. No wheezes. No rhonchi. CV: Regular rate. Regular rhythm. No murmur or rub. Skin: Warm and dry. M/S: No cyanosis. No obvious joint deformities  Neuro: Awake. Alert. Moves all four extremities. Psych: Oriented x 3. No anxiety. Data:  Split/night PSG on 5/22/15  controlled on CPAP therapy. + PLMS with leg movement index 22.0. CPAP compliance data:  Compliance download report from 6/3/17 to 7/2/17 showed patient is using machine 6:27 hrs/night with 100 % compliance and AHI 0.3 within this time frame. 30/30days with greater than 4 hours of machine use. 90% pressure 12.9 cm H20 on auto CPAP 12-16 cm H2O    Compliance download report from 6/2/18 to 7/1/18 reviewed today by me and showed patient is using machine 6:52 hrs/night with 100% compliance and AHI 0.4 within this time frame. 30/30days with greater than 4 hours of machine use. CPAP 13 cm H20    Assessment:      · Severe JESSICA. CPAP 13 cm H2O. Nasal mask.

## 2018-07-26 ENCOUNTER — TELEPHONE (OUTPATIENT)
Dept: CARDIOLOGY CLINIC | Age: 63
End: 2018-07-26

## 2018-11-13 DIAGNOSIS — I48.0 PAROXYSMAL ATRIAL FIBRILLATION (HCC): Primary | ICD-10-CM

## 2018-11-13 RX ORDER — HYDRALAZINE HYDROCHLORIDE 25 MG/1
TABLET, FILM COATED ORAL
Qty: 180 TABLET | Refills: 0 | Status: SHIPPED | OUTPATIENT
Start: 2018-11-13 | End: 2019-02-05 | Stop reason: SDUPTHER

## 2018-11-13 RX ORDER — EPLERENONE 25 MG/1
TABLET, FILM COATED ORAL
Qty: 90 TABLET | Refills: 0 | Status: SHIPPED | OUTPATIENT
Start: 2018-11-13 | End: 2019-01-20 | Stop reason: SDUPTHER

## 2018-11-13 RX ORDER — CARVEDILOL 6.25 MG/1
TABLET ORAL
Qty: 180 TABLET | Refills: 0 | Status: SHIPPED | OUTPATIENT
Start: 2018-11-13 | End: 2019-01-20 | Stop reason: SDUPTHER

## 2018-11-13 RX ORDER — AMIODARONE HYDROCHLORIDE 200 MG/1
TABLET ORAL
Qty: 45 TABLET | Refills: 0 | Status: SHIPPED | OUTPATIENT
Start: 2018-11-13 | End: 2019-02-05 | Stop reason: SDUPTHER

## 2018-11-13 RX ORDER — ISOSORBIDE MONONITRATE 30 MG/1
TABLET, EXTENDED RELEASE ORAL
Qty: 90 TABLET | Refills: 0 | Status: SHIPPED | OUTPATIENT
Start: 2018-11-13 | End: 2019-04-18 | Stop reason: SDUPTHER

## 2019-02-04 ENCOUNTER — HOSPITAL ENCOUNTER (OUTPATIENT)
Age: 64
Setting detail: SPECIMEN
Discharge: HOME | End: 2019-02-04
Payer: COMMERCIAL

## 2019-02-04 DIAGNOSIS — I48.0 PAROXYSMAL ATRIAL FIBRILLATION (HCC): ICD-10-CM

## 2019-02-04 PROCEDURE — 36415 COLL VENOUS BLD VENIPUNCTURE: CPT

## 2019-02-04 PROCEDURE — 84443 ASSAY THYROID STIM HORMONE: CPT

## 2019-02-04 PROCEDURE — 80076 HEPATIC FUNCTION PANEL: CPT

## 2019-02-04 PROCEDURE — 80048 BASIC METABOLIC PNL TOTAL CA: CPT

## 2019-02-05 ENCOUNTER — TELEPHONE (OUTPATIENT)
Dept: CARDIOLOGY CLINIC | Age: 64
End: 2019-02-05

## 2019-02-05 DIAGNOSIS — R89.9 ABNORMAL LABORATORY TEST: Primary | ICD-10-CM

## 2019-02-05 LAB
ALBUMIN SERPL-MCNC: 4.2 G/DL (ref 3.4–5)
ALP BLD-CCNC: 69 U/L (ref 40–129)
ALT SERPL-CCNC: 20 U/L (ref 10–40)
ANION GAP SERPL CALCULATED.3IONS-SCNC: 13 MMOL/L (ref 3–16)
AST SERPL-CCNC: 14 U/L (ref 15–37)
BILIRUB SERPL-MCNC: 0.5 MG/DL (ref 0–1)
BILIRUBIN DIRECT: <0.2 MG/DL (ref 0–0.3)
BILIRUBIN, INDIRECT: ABNORMAL MG/DL (ref 0–1)
BUN BLDV-MCNC: 30 MG/DL (ref 7–20)
CALCIUM SERPL-MCNC: 9.1 MG/DL (ref 8.3–10.6)
CHLORIDE BLD-SCNC: 103 MMOL/L (ref 99–110)
CO2: 25 MMOL/L (ref 21–32)
CREAT SERPL-MCNC: 1.7 MG/DL (ref 0.8–1.3)
GFR AFRICAN AMERICAN: 49
GFR NON-AFRICAN AMERICAN: 41
GLUCOSE BLD-MCNC: 88 MG/DL (ref 70–99)
POTASSIUM SERPL-SCNC: 4.3 MMOL/L (ref 3.5–5.1)
SODIUM BLD-SCNC: 141 MMOL/L (ref 136–145)
TOTAL PROTEIN: 6.6 G/DL (ref 6.4–8.2)
TSH REFLEX FT4: 1.13 UIU/ML (ref 0.27–4.2)

## 2019-02-05 RX ORDER — AMIODARONE HYDROCHLORIDE 200 MG/1
TABLET ORAL
Qty: 45 TABLET | Refills: 2 | Status: SHIPPED | OUTPATIENT
Start: 2019-02-05 | End: 2019-09-17 | Stop reason: SDUPTHER

## 2019-02-05 RX ORDER — HYDRALAZINE HYDROCHLORIDE 25 MG/1
TABLET, FILM COATED ORAL
Qty: 180 TABLET | Refills: 2 | Status: SHIPPED | OUTPATIENT
Start: 2019-02-05 | End: 2019-09-17 | Stop reason: SDUPTHER

## 2019-02-18 ENCOUNTER — HOSPITAL ENCOUNTER (OUTPATIENT)
Age: 64
Discharge: HOME | End: 2019-02-18
Payer: COMMERCIAL

## 2019-02-18 DIAGNOSIS — N18.30 CKD (CHRONIC KIDNEY DISEASE), STAGE III (HCC): ICD-10-CM

## 2019-02-18 LAB
ALBUMIN SERPL-MCNC: 3.9 G/DL (ref 3.4–5)
ANION GAP SERPL CALCULATED.3IONS-SCNC: 17 MMOL/L (ref 3–16)
BACTERIA: ABNORMAL /HPF
BUN BLDV-MCNC: 31 MG/DL (ref 7–20)
CALCIUM SERPL-MCNC: 9.6 MG/DL (ref 8.3–10.6)
CHLORIDE BLD-SCNC: 102 MMOL/L (ref 99–110)
CO2: 23 MMOL/L (ref 21–32)
CREAT SERPL-MCNC: 1.6 MG/DL (ref 0.8–1.3)
CREATININE URINE: 47.6 MG/DL (ref 39–259)
EPITHELIAL CELLS, UA: ABNORMAL /HPF
GFR AFRICAN AMERICAN: 53
GFR NON-AFRICAN AMERICAN: 43
GLUCOSE BLD-MCNC: 89 MG/DL (ref 70–99)
MICROALBUMIN UR-MCNC: 1.4 MG/DL
MICROALBUMIN/CREAT UR-RTO: 29.4 MG/G (ref 0–30)
PHOSPHORUS: 3.5 MG/DL (ref 2.5–4.9)
POTASSIUM SERPL-SCNC: 4.9 MMOL/L (ref 3.5–5.1)
RBC UA: ABNORMAL /HPF (ref 0–2)
SODIUM BLD-SCNC: 142 MMOL/L (ref 136–145)
WBC UA: ABNORMAL /HPF (ref 0–5)

## 2019-02-18 PROCEDURE — 80069 RENAL FUNCTION PANEL: CPT

## 2019-02-18 PROCEDURE — 82043 UR ALBUMIN QUANTITATIVE: CPT

## 2019-02-18 PROCEDURE — 82570 ASSAY OF URINE CREATININE: CPT

## 2019-02-18 PROCEDURE — 36415 COLL VENOUS BLD VENIPUNCTURE: CPT

## 2019-02-18 PROCEDURE — 81015 MICROSCOPIC EXAM OF URINE: CPT

## 2019-04-01 RX ORDER — EPLERENONE 25 MG/1
TABLET, FILM COATED ORAL
Qty: 90 TABLET | Refills: 1 | Status: SHIPPED | OUTPATIENT
Start: 2019-04-01 | End: 2019-09-06 | Stop reason: SDUPTHER

## 2019-04-25 NOTE — TELEPHONE ENCOUNTER
Medication Refill        Medication needing refilled: Nena    Doseage of the medication: 40 mg    How are you taking this medication (QD, BID, TID, QID, PRN): qd    Patient want a 30 or 90 day supply called in: 80    Which Pharm[facility] are we sending [facility] to[facility]    Pharmacy Phone number:[phone]    Pharmacy Fax number:[phone]    Last ov 5.30.18        Assessment:   1. Symptomatic paroxysmal (formerly persistent) atrial fibrillation:               -s/p RFCA 8/2015 (no known recurrence until 12/2016)              -s/p DCCV on 1/10/2017              -on Xarelto for [id number] score 3 (HTN, age, and CHF)  2. HTN: controlled  3. Chronic diastolic CHF: stable on Coreg, lisinopril, hydralazine, Lasix  4. CKD stage III  5. JESSICA: on CPAP  6. Sinus bradycardia: asymptomatic     Plan:   1. Continue amiodarone, Coreg, and Xarelto   2. Amiodarone labs Q6 months (due 10/2018)  3. Call the office if your HR is consistently less than 50  4.  Follow up in one year

## 2019-04-25 NOTE — TELEPHONE ENCOUNTER
Last OV 5/30/18 NPBB  Next OV 5/30/19 NPBB     2/18/19 BMP  2/4/19 LFT  1/20/17 Lipid    Patient only follows up with EP at this time, last appt was with Dr. Elba Enriquez in 2016. Becky Kimble

## 2019-04-26 RX ORDER — PRAVASTATIN SODIUM 40 MG
TABLET ORAL
Qty: 90 TABLET | Refills: 0 | OUTPATIENT
Start: 2019-04-26

## 2019-05-29 NOTE — PROGRESS NOTES
[place] [id number]     Electrophysiology Note               HISTORY OF PRESENT ILLNESS:   Shubham Iyer is a 75 y.o. male with a history of atrial fibrillation, sinus bradycardia, HTN, CHF, HLD, JESSICA, and CKD. He reported he had a cardioversion in 2007 for afib. He underwent successful cardioversion on 3/20/15. Repeat EKG on 4/16/15 showed afib. He complained of palpitations and shortness of breath. On 8/11/2015 he had an EP study and a radiofrequency catheter ablation of persistent atrial fibrillation. He had maintained sinus rhythm for over one year and his amiodarone was discontinued in 10/2016. Had recurrent afib in 12/2016 and amiodarone was restarted. He had a cardioversion on 1/10/2017. Amiodarone was decreased due to sinus bradycardia at subsequent visits. Was referred to nephrology in 2/2019 for CKD and amiodarone use. Today he is being seen for paroxysmal atrial fibrillation. EKG shows sinus rubén with a HR of 56. He complains of leg cramps, occurring daily. States hematuria resolved. Home HR averaging 50-60. He denies chest pain, palpitations, shortness of breath, and dizziness. Past Medical History:   has a past medical history of Atrial fibrillation ([address] CHF (congestive heart failure) ([address] CKD (chronic kidney disease) stage 3, GFR 30-59 ml/min ([facility] [address] Family history of early CAD, Hyperlipidemia, Hypertension, JESSICA (obstructive sleep apnea), and S/P ablation of atrial fibrillation. Past Surgical History:   has a past surgical history that includes back surgery; hernia repair; Knee arthroscopy (3/20/2012); Cardioversion (3/20/2015); transesophageal echocardiogram (8/11/2015); ablation of dysrhythmic focus (8/11/2015); Atrial ablation surgery (8/11/15); Cardioversion (01/10/2017); and transesophageal echocardiogram (01/10/2017). Home Medications:    Prior to Admission medications    Medication Sig Start Date End Date Taking?  Authorizing Provider   rivaroxaban (XARELTO) 20 MG TABS tablet TAKE 1 TABLET BY MOUTH  EVERY DAY 4/19/19  Yes Noa Plascencia MD   isosorbide mononitrate (IMDUR) 30 MG extended release tablet TAKE 1 TABLET BY MOUTH  EVERY DAY 4/19/19  Yes Noa Plascencia MD   eplerenone (INSPRA) 25 MG tablet TAKE 1 TABLET BY MOUTH  DAILY 4/1/19  Yes WESTON Wick CNP   amiodarone (CORDARONE) 200 MG tablet TAKE ONE-HALF TABLET BY  MOUTH DAILY  Patient taking differently: Take 100 mg by mouth 2 times daily TAKE ONE-HALF TABLET BY  MOUTH DAILY 2/5/19  Yes WESTON Wick - CNP   hydrALAZINE (APRESOLINE) 25 MG tablet TAKE 1 TABLET BY MOUTH  TWICE A DAY 2/5/19  Yes WESTON Wick - CNP   carvedilol (COREG) 6.25 MG tablet TAKE 1 TABLET BY MOUTH  TWICE A DAY WITH MEALS 1/21/19  Yes WESTON Wick - CNP   furosemide (LASIX) 40 MG tablet TAKE 1 TABLET BY MOUTH  DAILY IN THE MORNING 5/30/18  Yes WESTON Wick - CNP   lisinopril (PRINIVIL;ZESTRIL) 20 MG tablet TAKE 1 TABLET BY MOUTH TWO  TIMES DAILY 5/30/18  Yes WESTON Wick - CNP   pravastatin (PRAVACHOL) 40 MG tablet TAKE 1 TABLET BY MOUTH  DAILY 5/29/18  Yes Slick Barclay MD       Allergies:  Patient has no known allergies. Social History:   reports that he has never smoked. He has never used smokeless tobacco. He reports that he does not drink alcohol or use drugs. Family History: family history includes Cancer in his father; Diabetes in his brother and mother; Heart Disease in his mother and sister; High Blood Pressure in his mother; Osteoarthritis in his mother. Review of Systems   Constitutional: Negative. HENT: Negative. Eyes: Negative. Respiratory: Negative. Cardiovascular: see HPI  Gastrointestinal: Negative. Genitourinary: Negative. Musculoskeletal: + knee pain and leg cramps  Skin: Negative. Neurological: Negative. Hematological: Negative.     Psychiatric/Behavioral: Negative     PHYSICAL EXAM:      /80   Pulse 63   Ht 6' 2\" (1.88 m) Wt (!) 343 lb (155.6 kg)   SpO2 97%   BMI 44.04 kg/m²      Constitutional and General Appearance: alert, cooperative, no distress and appears stated age  [de-identified]: PERRL, no cervical lymphadenopathy. No masses palpable. Normal oral mucosa  Respiratory:  · Normal excursion and expansion without use of accessory muscles  · Resp Auscultation: Normal breath sounds without dullness or wheezing  Cardiovascular:  · The apical impulse is not displaced  · Heart tones are regular  · Jugular venous pulsation Normal  · The carotid upstroke is normal in amplitude and contour without delay or bruit  · Peripheral pulses are symmetrical and full  Abdomen:  · No masses or tenderness  · Bowel sounds present  Extremities:  ·  No cyanosis or clubbing  ·  No BLE edema  · Skin: Warm and dry  Neurological:  · Alert and oriented. · Moves all extremities well  · No abnormalities of mood, affect, memory, mentation, or behavior are noted  · Exhibits normal gait, balance, and coordination. DATA:    ECG 5/30/2019:  Sinus rubén HR 56    CHARLY 1/10/2017:  Ejection fraction is visually estimated to be 45-50 %.   Trivial mitral and tricuspid regurgitation.   No HARPREET clot   Compared to previous study from 8- ejection fraction remains unchanged. CHARLY 8/11/2015:  Ejection fraction is visually estimated to be 45-50 %. Trivial mitral regurgitation is present. The left atrial size is normal.  No evidence of patent foramen ovale. There is no evidence of mass or thrombus in the left atrium or appendage. Stress 2/25/15  Impression:   1. Small zone of anteroseptal reversibility, for which mild   ischemia should be considered in the appropriate clinical setting. No large areas of reversibility are seen. 2. Left ventricular ejection fraction is approximately 43%. Assessment:   1.  Symptomatic paroxysmal (formerly persistent) atrial fibrillation: stable   -s/p RFCA 8/2015 (no known recurrence until 12/2016)   -s/p DCCV on 1/10/2017   -[id number] score 3 (HTN, age, and CHF)  2. Sinus bradycardia: stable and asymptomatic  3. HTN: controlled  4. Chronic diastolic CHF:  compensated  5. CKD stage III: Followed by Dr. Gisele Anguiano  6. JESSICA: on CPAP, compliant   7. Legs cramps: new problem    Plan:   1. Continue amiodarone, Coreg, Inspra, Lasix, hydralazine, Imdur, lisinopril and Xarelto  2. TSH, BMP, and LFT every six months while on amiodarone (due 8/2019)  3. Call the office if your HR is consistently less than 50  4. Will order arterial doppler if electrolytes are normal and leg cramps persist  5.  Follow up in one year     Adria Horton, [address]  [phone]

## 2019-05-30 ENCOUNTER — OFFICE VISIT (OUTPATIENT)
Dept: CARDIOLOGY CLINIC | Age: 64
End: 2019-05-30
Payer: COMMERCIAL

## 2019-05-30 ENCOUNTER — HOSPITAL ENCOUNTER (OUTPATIENT)
Age: 64
Discharge: HOME | End: 2019-05-30
Payer: COMMERCIAL

## 2019-05-30 VITALS
SYSTOLIC BLOOD PRESSURE: 136 MMHG | WEIGHT: 315 LBS | HEART RATE: 63 BPM | HEIGHT: 74 IN | BODY MASS INDEX: 40.43 KG/M2 | DIASTOLIC BLOOD PRESSURE: 80 MMHG | OXYGEN SATURATION: 97 %

## 2019-05-30 DIAGNOSIS — I50.32 CHRONIC DIASTOLIC CONGESTIVE HEART FAILURE (HCC): ICD-10-CM

## 2019-05-30 DIAGNOSIS — N18.30 CKD (CHRONIC KIDNEY DISEASE), STAGE III (HCC): ICD-10-CM

## 2019-05-30 DIAGNOSIS — I48.4 ATYPICAL ATRIAL FLUTTER (HCC): ICD-10-CM

## 2019-05-30 DIAGNOSIS — I48.0 PAROXYSMAL ATRIAL FIBRILLATION (HCC): ICD-10-CM

## 2019-05-30 DIAGNOSIS — I48.0 PAROXYSMAL ATRIAL FIBRILLATION (HCC): Primary | ICD-10-CM

## 2019-05-30 DIAGNOSIS — I10 ESSENTIAL HYPERTENSION: ICD-10-CM

## 2019-05-30 LAB
ALBUMIN SERPL-MCNC: 4.2 G/DL (ref 3.4–5)
ANION GAP SERPL CALCULATED.3IONS-SCNC: 12 MMOL/L (ref 3–16)
BACTERIA: ABNORMAL /HPF
BUN BLDV-MCNC: 30 MG/DL (ref 7–20)
CALCIUM SERPL-MCNC: 9.7 MG/DL (ref 8.3–10.6)
CHLORIDE BLD-SCNC: 103 MMOL/L (ref 99–110)
CO2: 25 MMOL/L (ref 21–32)
CREAT SERPL-MCNC: 1.4 MG/DL (ref 0.8–1.3)
EPITHELIAL CELLS, UA: ABNORMAL /HPF
GFR AFRICAN AMERICAN: >60
GFR NON-AFRICAN AMERICAN: 51
GLUCOSE BLD-MCNC: 90 MG/DL (ref 70–99)
MAGNESIUM: 2.1 MG/DL (ref 1.8–2.4)
PHOSPHORUS: 3.4 MG/DL (ref 2.5–4.9)
POTASSIUM SERPL-SCNC: 4.5 MMOL/L (ref 3.5–5.1)
RBC UA: ABNORMAL /HPF (ref 0–2)
SODIUM BLD-SCNC: 140 MMOL/L (ref 136–145)
WBC UA: ABNORMAL /HPF (ref 0–5)

## 2019-05-30 PROCEDURE — 81015 MICROSCOPIC EXAM OF URINE: CPT

## 2019-05-30 PROCEDURE — 36415 COLL VENOUS BLD VENIPUNCTURE: CPT

## 2019-05-30 PROCEDURE — 80069 RENAL FUNCTION PANEL: CPT

## 2019-05-30 PROCEDURE — 99214 OFFICE O/P EST MOD 30 MIN: CPT | Performed by: NURSE PRACTITIONER

## 2019-05-30 PROCEDURE — 93000 ELECTROCARDIOGRAM COMPLETE: CPT | Performed by: NURSE PRACTITIONER

## 2019-05-30 PROCEDURE — 83735 ASSAY OF MAGNESIUM: CPT

## 2019-05-30 NOTE — LETTER
[place] [id number]     Electrophysiology Note               HISTORY OF PRESENT ILLNESS:   Hedwig Dakin is a 75 y.o. male with a history of atrial fibrillation, sinus bradycardia, HTN, CHF, HLD, JESSICA, and CKD. He reported he had a cardioversion in 2007 for afib. He underwent successful cardioversion on 3/20/15. Repeat EKG on 4/16/15 showed afib. He complained of palpitations and shortness of breath. On 8/11/2015 he had an EP study and a radiofrequency catheter ablation of persistent atrial fibrillation. He had maintained sinus rhythm for over one year and his amiodarone was discontinued in 10/2016. Had recurrent afib in 12/2016 and amiodarone was restarted. He had a cardioversion on 1/10/2017. Amiodarone was decreased due to sinus bradycardia at subsequent visits. Was referred to nephrology in 2/2019 for CKD and amiodarone use. Today he is being seen for paroxysmal atrial fibrillation. EKG shows sinus rubén with a HR of 56. He complains of leg cramps, occurring daily. States hematuria resolved. Home HR averaging 50-60. He denies chest pain, palpitations, shortness of breath, and dizziness. Past Medical History:   has a past medical history of Atrial fibrillation ([address] CHF (congestive heart failure) ([address] CKD (chronic kidney disease) stage 3, GFR 30-59 ml/min ([address] Family history of early CAD, Hyperlipidemia, Hypertension, JESSICA (obstructive sleep apnea), and S/P ablation of atrial fibrillation. Past Surgical History:   has a past surgical history that includes back surgery; hernia repair; Knee arthroscopy (3/20/2012); Cardioversion (3/20/2015); transesophageal echocardiogram (8/11/2015); ablation of dysrhythmic focus (8/11/2015); Atrial ablation surgery (8/11/15); Cardioversion (01/10/2017); and transesophageal echocardiogram (01/10/2017). Home Medications:    Prior to Admission medications    Medication Sig Start Date End Date Taking?  Authorizing Provider rivaroxaban (XARELTO) 20 MG TABS tablet TAKE 1 TABLET BY MOUTH  EVERY DAY 4/19/19  Yes Amrita Lomeli MD   isosorbide mononitrate (IMDUR) 30 MG extended release tablet TAKE 1 TABLET BY MOUTH  EVERY DAY 4/19/19  Yes Amrita Lomeli MD   eplerenone (INSPRA) 25 MG tablet TAKE 1 TABLET BY MOUTH  DAILY 4/1/19  Yes WESTON Prakash CNP   amiodarone (CORDARONE) 200 MG tablet TAKE ONE-HALF TABLET BY  MOUTH DAILY  Patient taking differently: Take 100 mg by mouth 2 times daily TAKE ONE-HALF TABLET BY  MOUTH DAILY 2/5/19  Yes WESTON Prakash CNP   hydrALAZINE (APRESOLINE) 25 MG tablet TAKE 1 TABLET BY MOUTH  TWICE A DAY 2/5/19  Yes WESTON Prakash CNP   carvedilol (COREG) 6.25 MG tablet TAKE 1 TABLET BY MOUTH  TWICE A DAY WITH MEALS 1/21/19  Yes WESTON Prakash CNP   furosemide (LASIX) 40 MG tablet TAKE 1 TABLET BY MOUTH  DAILY IN THE MORNING 5/30/18  Yes WESTON Prakash CNP   lisinopril (PRINIVIL;ZESTRIL) 20 MG tablet TAKE 1 TABLET BY MOUTH TWO  TIMES DAILY 5/30/18  Yes WESTON Prakash CNP   pravastatin (PRAVACHOL) 40 MG tablet TAKE 1 TABLET BY MOUTH  DAILY 5/29/18  Yes Driss Medina MD       Allergies:  Patient has no known allergies. Social History:   reports that he has never smoked. He has never used smokeless tobacco. He reports that he does not drink alcohol or use drugs. Family History: family history includes Cancer in his father; Diabetes in his brother and mother; Heart Disease in his mother and sister; High Blood Pressure in his mother; Osteoarthritis in his mother. Review of Systems   Constitutional: Negative. HENT: Negative. Eyes: Negative. Respiratory: Negative. Cardiovascular: see HPI  Gastrointestinal: Negative. Genitourinary: Negative. Musculoskeletal: + knee pain and leg cramps  Skin: Negative. Neurological: Negative. Hematological: Negative.     Psychiatric/Behavioral: Negative     PHYSICAL EXAM: /80   Pulse 63   Ht 6' 2\" (1.88 m)   Wt (!) 343 lb (155.6 kg)   SpO2 97%   BMI 44.04 kg/m²       Constitutional and General Appearance: alert, cooperative, no distress and appears stated age  [de-identified]: PERRL, no cervical lymphadenopathy. No masses palpable. Normal oral mucosa  Respiratory:  · Normal excursion and expansion without use of accessory muscles  · Resp Auscultation: Normal breath sounds without dullness or wheezing  Cardiovascular:  · The apical impulse is not displaced  · Heart tones are regular  · Jugular venous pulsation Normal  · The carotid upstroke is normal in amplitude and contour without delay or bruit  · Peripheral pulses are symmetrical and full  Abdomen:  · No masses or tenderness  · Bowel sounds present  Extremities:  ·  No cyanosis or clubbing  ·  No BLE edema  · Skin: Warm and dry  Neurological:  · Alert and oriented. · Moves all extremities well  · No abnormalities of mood, affect, memory, mentation, or behavior are noted  · Exhibits normal gait, balance, and coordination. DATA:    ECG 5/30/2019:  Sinus rubén HR 56    CHARLY 1/10/2017:  Ejection fraction is visually estimated to be 45-50 %.   Trivial mitral and tricuspid regurgitation.   No HARPREET clot   Compared to previous study from 8- ejection fraction remains unchanged. CHARLY 8/11/2015:  Ejection fraction is visually estimated to be 45-50 %. Trivial mitral regurgitation is present. The left atrial size is normal.  No evidence of patent foramen ovale. There is no evidence of mass or thrombus in the left atrium or appendage. Stress 2/25/15  Impression:   1. Small zone of anteroseptal reversibility, for which mild   ischemia should be considered in the appropriate clinical setting. No large areas of reversibility are seen. 2. Left ventricular ejection fraction is approximately 43%. Assessment:   1.  Symptomatic paroxysmal (formerly persistent) atrial fibrillation: stable

## 2019-05-30 NOTE — PATIENT INSTRUCTIONS
No changes today   Check potassium and magnesium levels  If blood tests are OK, can drink a Powerade Zero or Propel every day  If leg cramps continue, will order an ultrasound of legs  Follow up in one year

## 2019-07-01 ENCOUNTER — OFFICE VISIT (OUTPATIENT)
Dept: PULMONOLOGY | Age: 64
End: 2019-07-01
Payer: COMMERCIAL

## 2019-07-01 VITALS
RESPIRATION RATE: 20 BRPM | HEIGHT: 74 IN | WEIGHT: 315 LBS | SYSTOLIC BLOOD PRESSURE: 135 MMHG | DIASTOLIC BLOOD PRESSURE: 78 MMHG | OXYGEN SATURATION: 98 % | BODY MASS INDEX: 40.43 KG/M2 | HEART RATE: 75 BPM

## 2019-07-01 DIAGNOSIS — G47.33 SEVERE OBSTRUCTIVE SLEEP APNEA: Primary | ICD-10-CM

## 2019-07-01 DIAGNOSIS — Z71.89 CPAP USE COUNSELING: ICD-10-CM

## 2019-07-01 DIAGNOSIS — E66.01 OBESITY, CLASS III, BMI 40-49.9 (MORBID OBESITY) (HCC): ICD-10-CM

## 2019-07-01 PROCEDURE — 99213 OFFICE O/P EST LOW 20 MIN: CPT | Performed by: NURSE PRACTITIONER

## 2019-07-01 ASSESSMENT — SLEEP AND FATIGUE QUESTIONNAIRES
ESS TOTAL SCORE: 4
HOW LIKELY ARE YOU TO NOD OFF OR FALL ASLEEP WHILE SITTING INACTIVE IN A PUBLIC PLACE: 0
HOW LIKELY ARE YOU TO NOD OFF OR FALL ASLEEP WHILE LYING DOWN TO REST IN THE AFTERNOON WHEN CIRCUMSTANCES PERMIT: 0
NECK CIRCUMFERENCE (INCHES): 17.5
HOW LIKELY ARE YOU TO NOD OFF OR FALL ASLEEP WHEN YOU ARE A PASSENGER IN A CAR FOR AN HOUR WITHOUT A BREAK: 0
HOW LIKELY ARE YOU TO NOD OFF OR FALL ASLEEP IN A CAR, WHILE STOPPED FOR A FEW MINUTES IN TRAFFIC: 0
HOW LIKELY ARE YOU TO NOD OFF OR FALL ASLEEP WHILE SITTING AND READING: 1
HOW LIKELY ARE YOU TO NOD OFF OR FALL ASLEEP WHILE SITTING QUIETLY AFTER LUNCH WITHOUT ALCOHOL: 2
HOW LIKELY ARE YOU TO NOD OFF OR FALL ASLEEP WHILE WATCHING TV: 1
HOW LIKELY ARE YOU TO NOD OFF OR FALL ASLEEP WHILE SITTING AND TALKING TO SOMEONE: 0

## 2019-07-01 NOTE — PROGRESS NOTES
taking differently: Take 100 mg by mouth 2 times daily TAKE ONE-HALF TABLET BY  MOUTH DAILY), Disp: 45 tablet, Rfl: 2    hydrALAZINE (APRESOLINE) 25 MG tablet, TAKE 1 TABLET BY MOUTH  TWICE A DAY, Disp: 180 tablet, Rfl: 2    carvedilol (COREG) 6.25 MG tablet, TAKE 1 TABLET BY MOUTH  TWICE A DAY WITH MEALS, Disp: 180 tablet, Rfl: 3    furosemide (LASIX) 40 MG tablet, TAKE 1 TABLET BY MOUTH  DAILY IN THE MORNING, Disp: 90 tablet, Rfl: 3    lisinopril (PRINIVIL;ZESTRIL) 20 MG tablet, TAKE 1 TABLET BY MOUTH TWO  TIMES DAILY, Disp: 180 tablet, Rfl: 3    pravastatin (PRAVACHOL) 40 MG tablet, TAKE 1 TABLET BY MOUTH  DAILY, Disp: 90 tablet, Rfl: 3      Review of Systems      Objective:   Physical Exam  Blood pressure 135/78, pulse 75, resp. rate 20, height 6' 2\" (1.88 m), weight (!) 341 lb (154.7 kg), SpO2 98 %.' on RA    Gen: No acute distress. Obese. BMI of 43.78  Eyes: PERRL. No sclera icterus. No conjunctival injection. ENT: No discharge. Pharynx clear. Mallampati class IV. Large tongue   Neck: Trachea midline. No obvious mass. Neck circumference 17.5\"  Resp: No accessory muscle use. No crackles. No wheezes. No rhonchi. CV: Regular rate. Regular rhythm. No murmur or rub. Skin: Warm and dry. M/S: No cyanosis. No obvious joint deformities  Neuro: Awake. Alert. Moves all four extremities. Psych: Oriented x 3. No anxiety. Data:  Split/night PSG on 5/22/15  controlled on CPAP therapy. + PLMS with leg movement index 22.0. CPAP compliance data:  Compliance download report from 6/3/17 to 7/2/17 showed patient is using machine 6:27 hrs/night with 100 % compliance and AHI 0.3 within this time frame. 30/30days with greater than 4 hours of machine use. 90% pressure 12.9 cm H20 on auto CPAP 12-16 cm H2O    Compliance download report from 6/2/18 to 7/1/18 showed patient is using machine 6:52 hrs/night with 100% compliance and AHI 0.4 within this time frame.   30/30days with greater than 4 hours of

## 2019-07-08 RX ORDER — LISINOPRIL 20 MG/1
TABLET ORAL
Qty: 180 TABLET | Refills: 3 | OUTPATIENT
Start: 2019-07-08

## 2019-07-08 RX ORDER — FUROSEMIDE 40 MG/1
TABLET ORAL
Qty: 90 TABLET | Refills: 3 | Status: SHIPPED | OUTPATIENT
Start: 2019-07-08 | End: 2020-09-08 | Stop reason: SDUPTHER

## 2019-07-08 RX ORDER — FUROSEMIDE 40 MG/1
TABLET ORAL
Qty: 90 TABLET | Refills: 3 | OUTPATIENT
Start: 2019-07-08

## 2019-07-08 RX ORDER — LISINOPRIL 20 MG/1
TABLET ORAL
Qty: 180 TABLET | Refills: 3 | Status: SHIPPED | OUTPATIENT
Start: 2019-07-08 | End: 2020-08-05 | Stop reason: SDUPTHER

## 2019-07-09 RX ORDER — PRAVASTATIN SODIUM 40 MG
TABLET ORAL
Qty: 30 TABLET | Refills: 1 | Status: SHIPPED | OUTPATIENT
Start: 2019-07-09 | End: 2020-08-05 | Stop reason: SDUPTHER

## 2019-07-12 ENCOUNTER — HOSPITAL ENCOUNTER (EMERGENCY)
Age: 64
Discharge: HOME | End: 2019-07-12
Attending: EMERGENCY MEDICINE
Payer: COMMERCIAL

## 2019-07-12 ENCOUNTER — APPOINTMENT (OUTPATIENT)
Dept: GENERAL RADIOLOGY | Age: 64
End: 2019-07-12
Payer: COMMERCIAL

## 2019-07-12 VITALS
OXYGEN SATURATION: 96 % | RESPIRATION RATE: 16 BRPM | HEART RATE: 85 BPM | BODY MASS INDEX: 40.43 KG/M2 | TEMPERATURE: 98.6 F | SYSTOLIC BLOOD PRESSURE: 130 MMHG | HEIGHT: 74 IN | WEIGHT: 315 LBS | DIASTOLIC BLOOD PRESSURE: 80 MMHG

## 2019-07-12 DIAGNOSIS — I48.0 PAROXYSMAL ATRIAL FIBRILLATION (HCC): Primary | ICD-10-CM

## 2019-07-12 LAB
A/G RATIO: 1.5 (ref 1.1–2.2)
ALBUMIN SERPL-MCNC: 4.1 G/DL (ref 3.4–5)
ALP BLD-CCNC: 71 U/L (ref 40–129)
ALT SERPL-CCNC: 19 U/L (ref 10–40)
ANION GAP SERPL CALCULATED.3IONS-SCNC: 11 MMOL/L (ref 3–16)
AST SERPL-CCNC: 13 U/L (ref 15–37)
BASOPHILS ABSOLUTE: 0.1 K/UL (ref 0–0.2)
BASOPHILS RELATIVE PERCENT: 1 %
BILIRUB SERPL-MCNC: 0.4 MG/DL (ref 0–1)
BUN BLDV-MCNC: 39 MG/DL (ref 7–20)
CALCIUM SERPL-MCNC: 9.6 MG/DL (ref 8.3–10.6)
CHLORIDE BLD-SCNC: 104 MMOL/L (ref 99–110)
CO2: 22 MMOL/L (ref 21–32)
CREAT SERPL-MCNC: 1.6 MG/DL (ref 0.8–1.3)
EOSINOPHILS ABSOLUTE: 0.1 K/UL (ref 0–0.6)
EOSINOPHILS RELATIVE PERCENT: 1.7 %
GFR AFRICAN AMERICAN: 53
GFR NON-AFRICAN AMERICAN: 43
GLOBULIN: 2.7 G/DL
GLUCOSE BLD-MCNC: 134 MG/DL (ref 70–99)
HCT VFR BLD CALC: 44.3 % (ref 40.5–52.5)
HEMOGLOBIN: 14.9 G/DL (ref 13.5–17.5)
LYMPHOCYTES ABSOLUTE: 1 K/UL (ref 1–5.1)
LYMPHOCYTES RELATIVE PERCENT: 13.9 %
MCH RBC QN AUTO: 30 PG (ref 26–34)
MCHC RBC AUTO-ENTMCNC: 33.6 G/DL (ref 31–36)
MCV RBC AUTO: 89.2 FL (ref 80–100)
MONOCYTES ABSOLUTE: 0.6 K/UL (ref 0–1.3)
MONOCYTES RELATIVE PERCENT: 7.6 %
NEUTROPHILS ABSOLUTE: 5.6 K/UL (ref 1.7–7.7)
NEUTROPHILS RELATIVE PERCENT: 75.8 %
PDW BLD-RTO: 13.7 % (ref 12.4–15.4)
PLATELET # BLD: 210 K/UL (ref 135–450)
PMV BLD AUTO: 8.1 FL (ref 5–10.5)
POTASSIUM SERPL-SCNC: 4.1 MMOL/L (ref 3.5–5.1)
RBC # BLD: 4.96 M/UL (ref 4.2–5.9)
SODIUM BLD-SCNC: 137 MMOL/L (ref 136–145)
TOTAL PROTEIN: 6.8 G/DL (ref 6.4–8.2)
TROPONIN: <0.01 NG/ML
WBC # BLD: 7.4 K/UL (ref 4–11)

## 2019-07-12 PROCEDURE — 80053 COMPREHEN METABOLIC PANEL: CPT

## 2019-07-12 PROCEDURE — 99285 EMERGENCY DEPT VISIT HI MDM: CPT

## 2019-07-12 PROCEDURE — 2580000003 HC RX 258: Performed by: NURSE PRACTITIONER

## 2019-07-12 PROCEDURE — 71046 X-RAY EXAM CHEST 2 VIEWS: CPT

## 2019-07-12 PROCEDURE — 85025 COMPLETE CBC W/AUTO DIFF WBC: CPT

## 2019-07-12 PROCEDURE — 84484 ASSAY OF TROPONIN QUANT: CPT

## 2019-07-12 PROCEDURE — 93005 ELECTROCARDIOGRAM TRACING: CPT | Performed by: EMERGENCY MEDICINE

## 2019-07-12 RX ORDER — DILTIAZEM HYDROCHLORIDE 5 MG/ML
10 INJECTION INTRAVENOUS ONCE
Status: DISCONTINUED | OUTPATIENT
Start: 2019-07-12 | End: 2019-07-12

## 2019-07-12 RX ORDER — 0.9 % SODIUM CHLORIDE 0.9 %
1000 INTRAVENOUS SOLUTION INTRAVENOUS ONCE
Status: COMPLETED | OUTPATIENT
Start: 2019-07-12 | End: 2019-07-12

## 2019-07-12 RX ADMIN — SODIUM CHLORIDE 1000 ML: 9 INJECTION, SOLUTION INTRAVENOUS at 20:26

## 2019-07-13 LAB
EKG ATRIAL RATE: 113 BPM
EKG DIAGNOSIS: NORMAL
EKG Q-T INTERVAL: 328 MS
EKG QRS DURATION: 90 MS
EKG QTC CALCULATION (BAZETT): 455 MS
EKG R AXIS: -43 DEGREES
EKG T AXIS: 37 DEGREES
EKG VENTRICULAR RATE: 116 BPM

## 2019-07-13 PROCEDURE — 93010 ELECTROCARDIOGRAM REPORT: CPT | Performed by: INTERNAL MEDICINE

## 2019-07-14 NOTE — ED PROVIDER NOTES
Height: 6' 2\" (1.88 m)        HENT: Normocephalic. Atraumatic. External ears normal, without discharge. TMs clear bilaterally. No nasal discharge. Oropharynx clear, no erythema. Mucous membranes moist.  EYES: Conjunctiva non-injected, no lid abnormalities noted. No scleral icterus. PERRL. EOM's grossly intact. Anterior chambers clear. NECK: Supple. Normal ROM. No meningismus. No thyroid tenderness or swelling noted. CARDIOVASCULAR: irregular, tachycardic. No Murmer. PULMONARY/CHEST WALL: Effort normal. No tachypnea. Lungs clear to ausculation. ABDOMEN: Normal BS. Soft. Nondistended. No tenderness to palpate. No guarding. No hernias noted. No splenomegaly. Back: Spine is midline. No ecchymosis. No crepitus on palpation. No obvious subluxation of vertebral column. No saddle anesthesia or evidence of cauda equina. /ANORECTAL: Not assessed  MUSKULOSKELETAL: Normal ROM. No acute deformities. No edema. No tenderness to palpate. SKIN: Warm and dry. NEUROLOGICAL:  GCS 15. CN II-XII grossly intact. Strength is 5/5 in allextremities and sensation is intact. PSYCHIATRIC: Normal affect, normal insight and judgement. Alert andoriented x 3.         DIAGNOSTIC RESULTS:     LABS:    Results for orders placed or performed during the hospital encounter of 07/12/19   CBC Auto Differential   Result Value Ref Range    WBC 7.4 4.0 - 11.0 K/uL    RBC 4.96 4.20 - 5.90 M/uL    Hemoglobin 14.9 13.5 - 17.5 g/dL    Hematocrit 44.3 40.5 - 52.5 %    MCV 89.2 80.0 - 100.0 fL    MCH 30.0 26.0 - 34.0 pg    MCHC 33.6 31.0 - 36.0 g/dL    RDW 13.7 12.4 - 15.4 %    Platelets 707 114 - 111 K/uL    MPV 8.1 5.0 - 10.5 fL    Neutrophils % 75.8 %    Lymphocytes % 13.9 %    Monocytes % 7.6 %    Eosinophils % 1.7 %    Basophils % 1.0 %    Neutrophils # 5.6 1.7 - 7.7 K/uL    Lymphocytes # 1.0 1.0 - 5.1 K/uL    Monocytes # 0.6 0.0 - 1.3 K/uL    Eosinophils # 0.1 0.0 - 0.6 K/uL    Basophils # 0.1 0.0 - 0.2 K/uL   Comprehensive Metabolic Panel

## 2019-07-15 ENCOUNTER — TELEPHONE (OUTPATIENT)
Dept: CARDIOLOGY CLINIC | Age: 64
End: 2019-07-15

## 2019-07-17 NOTE — TELEPHONE ENCOUNTER
I have an opening on Tuesday. If he is feeling bad and needs to be seen sooner, I can see him this Friday at 8am if MA available.

## 2019-07-19 ENCOUNTER — TELEPHONE (OUTPATIENT)
Dept: CARDIOLOGY CLINIC | Age: 64
End: 2019-07-19

## 2019-07-19 ENCOUNTER — OFFICE VISIT (OUTPATIENT)
Dept: CARDIOLOGY CLINIC | Age: 64
End: 2019-07-19
Payer: COMMERCIAL

## 2019-07-19 VITALS
SYSTOLIC BLOOD PRESSURE: 128 MMHG | BODY MASS INDEX: 40.43 KG/M2 | HEIGHT: 74 IN | OXYGEN SATURATION: 100 % | WEIGHT: 315 LBS | HEART RATE: 55 BPM | DIASTOLIC BLOOD PRESSURE: 62 MMHG

## 2019-07-19 DIAGNOSIS — I50.32 CHRONIC DIASTOLIC CONGESTIVE HEART FAILURE (HCC): ICD-10-CM

## 2019-07-19 DIAGNOSIS — R07.2 PRECORDIAL PAIN: ICD-10-CM

## 2019-07-19 DIAGNOSIS — I10 ESSENTIAL HYPERTENSION: ICD-10-CM

## 2019-07-19 DIAGNOSIS — I48.0 PAROXYSMAL ATRIAL FIBRILLATION (HCC): Primary | ICD-10-CM

## 2019-07-19 PROCEDURE — 3017F COLORECTAL CA SCREEN DOC REV: CPT | Performed by: NURSE PRACTITIONER

## 2019-07-19 PROCEDURE — G8427 DOCREV CUR MEDS BY ELIG CLIN: HCPCS | Performed by: NURSE PRACTITIONER

## 2019-07-19 PROCEDURE — 99215 OFFICE O/P EST HI 40 MIN: CPT | Performed by: NURSE PRACTITIONER

## 2019-07-19 PROCEDURE — 1123F ACP DISCUSS/DSCN MKR DOCD: CPT | Performed by: NURSE PRACTITIONER

## 2019-07-19 PROCEDURE — 1036F TOBACCO NON-USER: CPT | Performed by: NURSE PRACTITIONER

## 2019-07-19 PROCEDURE — 93000 ELECTROCARDIOGRAM COMPLETE: CPT | Performed by: NURSE PRACTITIONER

## 2019-07-19 PROCEDURE — 4040F PNEUMOC VAC/ADMIN/RCVD: CPT | Performed by: NURSE PRACTITIONER

## 2019-07-19 PROCEDURE — G8417 CALC BMI ABV UP PARAM F/U: HCPCS | Performed by: NURSE PRACTITIONER

## 2019-07-19 NOTE — LETTER
[facility]     Electrophysiology Note               HISTORY OF PRESENT ILLNESS:   Yash Tena is a 75 y.o. male with a history of atrial fibrillation, sinus bradycardia, HTN, CHF, HLD, JESSICA, and CKD. He reported he had a cardioversion in 2007 for afib. He underwent successful cardioversion on 3/20/15. Repeat EKG on 4/16/15 showed afib. He complained of palpitations and shortness of breath. On 8/11/2015 he had an EP study and a radiofrequency catheter ablation of persistent atrial fibrillation. He had maintained sinus rhythm for over one year and his amiodarone was discontinued in 10/2016. Had recurrent afib in 12/2016 and amiodarone was restarted. He had a cardioversion on 1/10/2017. Amiodarone was decreased due to sinus bradycardia at subsequent visits. Was referred to nephrology in 2/2019 for CKD and amiodarone use. He had maintained sinus rhythm at subsequent visits until 7/2019. He went to the ER complaining of an increased HR and EKG showed AF with a HR of 116. Today he is being seen for atrial fibrillation. EKG shows afib with a HR of 80. Complains of intermittent chest pressure that is worse with exertion, occurring since Friday. Denies jaw pain, nausea, SOB and palpitations. Past Medical History:   has a past medical history of Atrial fibrillation ([address] CHF (congestive heart failure) ([facility] [address] CKD (chronic kidney disease) stage 3, GFR 30-59 ml/min ([facility] [address] Family history of early CAD, Hyperlipidemia, Hypertension, JESSICA (obstructive sleep apnea), and S/P ablation of atrial fibrillation. Past Surgical History:   has a past surgical history that includes back surgery; hernia repair; Knee arthroscopy (3/20/2012); Cardioversion (3/20/2015); transesophageal echocardiogram (8/11/2015); ablation of dysrhythmic focus (8/11/2015); Atrial ablation surgery (8/11/15); Cardioversion (01/10/2017); and transesophageal echocardiogram (01/10/2017).      Home Medications:

## 2019-07-19 NOTE — PROGRESS NOTES
[place] [address]     Electrophysiology Note               HISTORY OF PRESENT ILLNESS:   Yaritza Hanley is a 75 y.o. male with a history of atrial fibrillation, sinus bradycardia, HTN, CHF, HLD, JESSICA, and CKD. He reported he had a cardioversion in 2007 for afib. He underwent successful cardioversion on 3/20/15. Repeat EKG on 4/16/15 showed afib. He complained of palpitations and shortness of breath. On 8/11/2015 he had an EP study and a radiofrequency catheter ablation of persistent atrial fibrillation. He had maintained sinus rhythm for over one year and his amiodarone was discontinued in 10/2016. Had recurrent afib in 12/2016 and amiodarone was restarted. He had a cardioversion on 1/10/2017. Amiodarone was decreased due to sinus bradycardia at subsequent visits. Was referred to nephrology in 2/2019 for CKD and amiodarone use. He had maintained sinus rhythm at subsequent visits until 7/2019. He went to the ER complaining of an increased HR and EKG showed AF with a HR of 116. Today he is being seen for atrial fibrillation. EKG shows afib with a HR of 80. Complains of intermittent chest pressure that is worse with exertion, occurring since Friday. Denies jaw pain, nausea, SOB and palpitations. Past Medical History:   has a past medical history of Atrial fibrillation ([address] CHF (congestive heart failure) ([facility] CKD (chronic kidney disease) stage 3, GFR 30-59 ml/min ([address] Family history of early CAD, Hyperlipidemia, Hypertension, JESSICA (obstructive sleep apnea), and S/P ablation of atrial fibrillation. Past Surgical History:   has a past surgical history that includes back surgery; hernia repair; Knee arthroscopy (3/20/2012); Cardioversion (3/20/2015); transesophageal echocardiogram (8/11/2015); ablation of dysrhythmic focus (8/11/2015); Atrial ablation surgery (8/11/15); Cardioversion (01/10/2017); and transesophageal echocardiogram (01/10/2017).      Home Medications:    Prior to Admission medications Negative     PHYSICAL EXAM:      /62   Pulse 55   Ht 6' 2\" (1.88 m)   Wt (!) 336 lb 12.8 oz (152.8 kg)   SpO2 100%   BMI 43.24 kg/m²      Constitutional and General Appearance: alert, cooperative, no distress and appears stated age  HEENT: PERRL, no cervical lymphadenopathy. No masses palpable. Normal oral mucosa  Respiratory:  · Normal excursion and expansion without use of accessory muscles  · Resp Auscultation: Normal breath sounds without dullness or wheezing  Cardiovascular:  · The apical impulse is not displaced  · Heart tones are irregular   · Jugular venous pulsation Normal  · The carotid upstroke is normal in amplitude and contour without delay or bruit  · Peripheral pulses are symmetrical and full  Abdomen:  · No masses or tenderness  · Bowel sounds present  Extremities:  ·  No cyanosis or clubbing  ·  No BLE edema  · Skin: Warm and dry  Neurological:  · Alert and oriented. · Moves all extremities well  · No abnormalities of mood, affect, memory, mentation, or behavior are noted  · Exhibits normal gait, balance, and coordination. DATA:    EKG 7/19/2019: Afib with a HR of 80    EKG 7/12/2019: Afib     CHARLY 1/10/2017:  Ejection fraction is visually estimated to be 45-50 %.   Trivial mitral and tricuspid regurgitation.   No HARPREET clot   Compared to previous study from 8- ejection fraction remains unchanged. CHARLY 8/11/2015:  Ejection fraction is visually estimated to be 45-50 %. Trivial mitral regurgitation is present. The left atrial size is normal.  No evidence of patent foramen ovale. There is no evidence of mass or thrombus in the left atrium or appendage. Stress 2/25/15  Impression:   1. Small zone of anteroseptal reversibility, for which mild   ischemia should be considered in the appropriate clinical setting. No large areas of reversibility are seen. 2. Left ventricular ejection fraction is approximately 43%. Assessment:   1.  Symptomatic atrial fibrillation

## 2019-07-25 ENCOUNTER — HOSPITAL ENCOUNTER (OUTPATIENT)
Dept: CARDIAC CATH/INVASIVE PROCEDURES | Age: 64
Discharge: HOME | End: 2019-07-25
Attending: INTERNAL MEDICINE | Admitting: INTERNAL MEDICINE
Payer: COMMERCIAL

## 2019-07-25 VITALS — BODY MASS INDEX: 40.43 KG/M2 | WEIGHT: 315 LBS | HEIGHT: 74 IN

## 2019-07-25 PROBLEM — I20.0 UNSTABLE ANGINA (HCC): Status: ACTIVE | Noted: 2019-07-25

## 2019-07-25 PROBLEM — I42.9 CARDIOMYOPATHY (HCC): Status: ACTIVE | Noted: 2019-07-25

## 2019-07-25 LAB
ANION GAP SERPL CALCULATED.3IONS-SCNC: 10 MMOL/L (ref 3–16)
BUN BLDV-MCNC: 36 MG/DL (ref 7–20)
CALCIUM SERPL-MCNC: 9.3 MG/DL (ref 8.3–10.6)
CHLORIDE BLD-SCNC: 108 MMOL/L (ref 99–110)
CO2: 24 MMOL/L (ref 21–32)
CREAT SERPL-MCNC: 1.6 MG/DL (ref 0.8–1.3)
GFR AFRICAN AMERICAN: 53
GFR NON-AFRICAN AMERICAN: 43
GLUCOSE BLD-MCNC: 108 MG/DL (ref 70–99)
HCT VFR BLD CALC: 44.5 % (ref 40.5–52.5)
HEMOGLOBIN: 14.8 G/DL (ref 13.5–17.5)
INR BLD: 1.12 (ref 0.86–1.14)
LEFT VENTRICULAR EJECTION FRACTION HIGH VALUE: 40 %
LEFT VENTRICULAR EJECTION FRACTION MODE: NORMAL
MCH RBC QN AUTO: 29.7 PG (ref 26–34)
MCHC RBC AUTO-ENTMCNC: 33.3 G/DL (ref 31–36)
MCV RBC AUTO: 89.2 FL (ref 80–100)
PDW BLD-RTO: 13.9 % (ref 12.4–15.4)
PLATELET # BLD: 190 K/UL (ref 135–450)
PMV BLD AUTO: 8.2 FL (ref 5–10.5)
POTASSIUM SERPL-SCNC: 4.2 MMOL/L (ref 3.5–5.1)
PROTHROMBIN TIME: 12.8 SEC (ref 9.8–13)
RBC # BLD: 4.99 M/UL (ref 4.2–5.9)
SODIUM BLD-SCNC: 142 MMOL/L (ref 136–145)
WBC # BLD: 4.7 K/UL (ref 4–11)

## 2019-07-25 PROCEDURE — 2709999900 HC NON-CHARGEABLE SUPPLY

## 2019-07-25 PROCEDURE — C1887 CATHETER, GUIDING: HCPCS

## 2019-07-25 PROCEDURE — 93005 ELECTROCARDIOGRAM TRACING: CPT | Performed by: INTERNAL MEDICINE

## 2019-07-25 PROCEDURE — 85027 COMPLETE CBC AUTOMATED: CPT

## 2019-07-25 PROCEDURE — 93458 L HRT ARTERY/VENTRICLE ANGIO: CPT | Performed by: INTERNAL MEDICINE

## 2019-07-25 PROCEDURE — 6360000004 HC RX CONTRAST MEDICATION

## 2019-07-25 PROCEDURE — 80048 BASIC METABOLIC PNL TOTAL CA: CPT

## 2019-07-25 PROCEDURE — 6360000002 HC RX W HCPCS

## 2019-07-25 PROCEDURE — 2500000003 HC RX 250 WO HCPCS

## 2019-07-25 PROCEDURE — 2580000003 HC RX 258

## 2019-07-25 PROCEDURE — 6370000000 HC RX 637 (ALT 250 FOR IP)

## 2019-07-25 PROCEDURE — 99153 MOD SED SAME PHYS/QHP EA: CPT

## 2019-07-25 PROCEDURE — C1769 GUIDE WIRE: HCPCS

## 2019-07-25 PROCEDURE — 99152 MOD SED SAME PHYS/QHP 5/>YRS: CPT

## 2019-07-25 PROCEDURE — 93458 L HRT ARTERY/VENTRICLE ANGIO: CPT

## 2019-07-25 PROCEDURE — C1894 INTRO/SHEATH, NON-LASER: HCPCS

## 2019-07-25 PROCEDURE — 85610 PROTHROMBIN TIME: CPT

## 2019-07-25 RX ORDER — FENTANYL CITRATE 50 UG/ML
50 INJECTION, SOLUTION INTRAMUSCULAR; INTRAVENOUS ONCE
Status: COMPLETED | OUTPATIENT
Start: 2019-07-25 | End: 2019-07-25

## 2019-07-25 RX ORDER — ASPIRIN 325 MG
325 TABLET ORAL ONCE
Status: COMPLETED | OUTPATIENT
Start: 2019-07-25 | End: 2019-07-25

## 2019-07-25 RX ORDER — MIDAZOLAM HYDROCHLORIDE 1 MG/ML
2 INJECTION INTRAMUSCULAR; INTRAVENOUS ONCE
Status: COMPLETED | OUTPATIENT
Start: 2019-07-25 | End: 2019-07-25

## 2019-07-25 RX ORDER — SODIUM CHLORIDE 9 MG/ML
INJECTION, SOLUTION INTRAVENOUS CONTINUOUS
Status: DISCONTINUED | OUTPATIENT
Start: 2019-07-25 | End: 2019-07-25 | Stop reason: HOSPADM

## 2019-07-25 RX ORDER — HYDRALAZINE HYDROCHLORIDE 20 MG/ML
10 INJECTION INTRAMUSCULAR; INTRAVENOUS EVERY 10 MIN PRN
Status: CANCELLED | OUTPATIENT
Start: 2019-07-25

## 2019-07-25 RX ORDER — SODIUM CHLORIDE 9 MG/ML
INJECTION, SOLUTION INTRAVENOUS CONTINUOUS
Status: CANCELLED | OUTPATIENT
Start: 2019-07-25 | End: 2019-07-31

## 2019-07-25 RX ADMIN — MIDAZOLAM HYDROCHLORIDE 2 MG: 1 INJECTION INTRAMUSCULAR; INTRAVENOUS at 08:14

## 2019-07-25 RX ADMIN — Medication 325 MG: at 07:41

## 2019-07-25 RX ADMIN — SODIUM CHLORIDE: 9 INJECTION, SOLUTION INTRAVENOUS at 07:40

## 2019-07-25 RX ADMIN — FENTANYL CITRATE 50 MCG: 50 INJECTION, SOLUTION INTRAMUSCULAR; INTRAVENOUS at 08:14

## 2019-07-25 ASSESSMENT — PAIN SCALES - GENERAL: PAINLEVEL_OUTOF10: 0

## 2019-07-25 NOTE — H&P
hernia repair; Knee arthroscopy (3/20/2012); Cardioversion (3/20/2015); transesophageal echocardiogram (8/11/2015); ablation of dysrhythmic focus (8/11/2015); Atrial ablation surgery (8/11/15); Cardioversion (01/10/2017); and transesophageal echocardiogram (01/10/2017). Social History:   reports that he has never smoked. He has never used smokeless tobacco. He reports that he does not drink alcohol or use drugs. Family History:  No evidence for sudden cardiac death or premature CAD    Home Medications:  Reviewed and are listed in nursing record. and/or listed below  Current Facility-Administered Medications   Medication Dose Route Frequency Provider Last Rate Last Dose    0.9 % sodium chloride infusion   Intravenous Continuous Nell Rankin MD 30 mL/hr at 07/25/19 0740          Allergies:  Patient has no known allergies. Review of Systems:   All 14 point review of symptoms completed. Pertinent positives identified in the HPI, all other review of symptoms negative as below. Physical Examination:    There were no vitals filed for this visit. Weight: (!) 336 lb (152.4 kg)     Wt Readings from Last 3 Encounters:   07/25/19 (!) 336 lb (152.4 kg)   07/19/19 (!) 336 lb 12.8 oz (152.8 kg)   07/12/19 (!) 343 lb (155.6 kg)     No intake/output data recorded. No intake/output data recorded.     General Appearance:  Alert, cooperative, no distress, appears stated age   Head:  Normocephalic, without obvious abnormality, atraumatic   Eyes:  PERRL, conjunctiva/corneas clear       Nose: Nares normal, no drainage or sinus tenderness   Throat: Lips, mucosa, and tongue normal   Neck: Supple, symmetrical, trachea midline, no adenopathy, thyroid: not enlarged, symmetric, no tenderness/mass/nodules, no carotid bruit or JVD       Lungs:   Clear to auscultation bilaterally, respirations unlabored   Chest Wall:  No tenderness or deformity   Heart:  Regular rhythm and normal rate; S1, S2 are normal; no murmur noted; no angina ([facility] [address]    Cardiomyopathy ([facility] [address]  Resolved Problems:    * No resolved hospital problems. *      Plan:  1. Proceed with planned procedure for further assessment. ~I had the opportunity to review the [address] clinical presentation and the available pertinent data. With the patient's clinical risk factors and symptoms, there is a high pretest likelihood for CAD. I have asked [address] to undergo cardiac angiography for further diagnostic testing. The procedure was explained in depth. All questions and alternate treatment strategies were discussed. The patient agrees to proceed. They understand all the risks associated with the procedure, including myocardial infarction, stroke, death, vascular complications, and the possible need for emergent surgery. All questions and concerns were addressed to the patient/family. Alternatives to my treatment were discussed. The note was completed using EMR. Every effort was made to ensure accuracy; however, inadvertent computerized transcription errors may be present.     Octavio Osorio MD, [address], [facility], [address] [facility]  [phone] [facility]  7/25/2019  8:13 AM

## 2019-07-26 ENCOUNTER — TELEPHONE (OUTPATIENT)
Dept: CARDIOLOGY CLINIC | Age: 64
End: 2019-07-26

## 2019-07-26 LAB
EKG ATRIAL RATE: 41 BPM
EKG DIAGNOSIS: NORMAL
EKG Q-T INTERVAL: 378 MS
EKG QRS DURATION: 90 MS
EKG QTC CALCULATION (BAZETT): 425 MS
EKG R AXIS: -16 DEGREES
EKG T AXIS: 8 DEGREES
EKG VENTRICULAR RATE: 76 BPM

## 2019-07-26 PROCEDURE — 93010 ELECTROCARDIOGRAM REPORT: CPT | Performed by: INTERNAL MEDICINE

## 2019-08-13 ENCOUNTER — HOSPITAL ENCOUNTER (OUTPATIENT)
Dept: CARDIAC CATH/INVASIVE PROCEDURES | Age: 64
Discharge: HOME | End: 2019-08-13
Attending: INTERNAL MEDICINE | Admitting: INTERNAL MEDICINE
Payer: COMMERCIAL

## 2019-08-13 ENCOUNTER — ANESTHESIA (OUTPATIENT)
Dept: CARDIAC CATH/INVASIVE PROCEDURES | Age: 64
End: 2019-08-13
Payer: COMMERCIAL

## 2019-08-13 ENCOUNTER — HOSPITAL ENCOUNTER (OUTPATIENT)
Dept: NON INVASIVE DIAGNOSTICS | Age: 64
Discharge: HOME | End: 2019-08-13
Payer: COMMERCIAL

## 2019-08-13 ENCOUNTER — ANESTHESIA EVENT (OUTPATIENT)
Dept: CARDIAC CATH/INVASIVE PROCEDURES | Age: 64
End: 2019-08-13
Payer: COMMERCIAL

## 2019-08-13 VITALS — WEIGHT: 315 LBS | HEIGHT: 74 IN | BODY MASS INDEX: 40.43 KG/M2

## 2019-08-13 VITALS — DIASTOLIC BLOOD PRESSURE: 88 MMHG | SYSTOLIC BLOOD PRESSURE: 119 MMHG | OXYGEN SATURATION: 100 % | TEMPERATURE: 98.6 F

## 2019-08-13 DIAGNOSIS — I42.0 DILATED CARDIOMYOPATHY (HCC): Primary | ICD-10-CM

## 2019-08-13 DIAGNOSIS — I48.0 PAROXYSMAL ATRIAL FIBRILLATION (HCC): ICD-10-CM

## 2019-08-13 PROBLEM — I48.92 ATRIAL FLUTTER (HCC): Status: ACTIVE | Noted: 2017-01-10

## 2019-08-13 LAB
ANION GAP SERPL CALCULATED.3IONS-SCNC: 11 MMOL/L (ref 3–16)
BASOPHILS ABSOLUTE: 0.1 K/UL (ref 0–0.2)
BASOPHILS RELATIVE PERCENT: 0.9 %
BUN BLDV-MCNC: 35 MG/DL (ref 7–20)
CALCIUM SERPL-MCNC: 9.4 MG/DL (ref 8.3–10.6)
CHLORIDE BLD-SCNC: 103 MMOL/L (ref 99–110)
CO2: 26 MMOL/L (ref 21–32)
CREAT SERPL-MCNC: 1.4 MG/DL (ref 0.8–1.3)
EKG ATRIAL RATE: 86 BPM
EKG DIAGNOSIS: NORMAL
EKG Q-T INTERVAL: 368 MS
EKG QRS DURATION: 96 MS
EKG QTC CALCULATION (BAZETT): 440 MS
EKG R AXIS: -35 DEGREES
EKG T AXIS: -6 DEGREES
EKG VENTRICULAR RATE: 86 BPM
EOSINOPHILS ABSOLUTE: 0.1 K/UL (ref 0–0.6)
EOSINOPHILS RELATIVE PERCENT: 2.1 %
GFR AFRICAN AMERICAN: >60
GFR NON-AFRICAN AMERICAN: 51
GLUCOSE BLD-MCNC: 116 MG/DL (ref 70–99)
HCT VFR BLD CALC: 42.5 % (ref 40.5–52.5)
HEMOGLOBIN: 14.2 G/DL (ref 13.5–17.5)
INR BLD: 1.63 (ref 0.86–1.14)
LYMPHOCYTES ABSOLUTE: 1 K/UL (ref 1–5.1)
LYMPHOCYTES RELATIVE PERCENT: 18.2 %
MCH RBC QN AUTO: 29.8 PG (ref 26–34)
MCHC RBC AUTO-ENTMCNC: 33.5 G/DL (ref 31–36)
MCV RBC AUTO: 88.9 FL (ref 80–100)
MONOCYTES ABSOLUTE: 0.5 K/UL (ref 0–1.3)
MONOCYTES RELATIVE PERCENT: 9.8 %
NEUTROPHILS ABSOLUTE: 3.8 K/UL (ref 1.7–7.7)
NEUTROPHILS RELATIVE PERCENT: 69 %
PDW BLD-RTO: 14.1 % (ref 12.4–15.4)
PLATELET # BLD: 194 K/UL (ref 135–450)
PMV BLD AUTO: 8.2 FL (ref 5–10.5)
POTASSIUM SERPL-SCNC: 4.2 MMOL/L (ref 3.5–5.1)
PROTHROMBIN TIME: 18.6 SEC (ref 9.8–13)
RBC # BLD: 4.77 M/UL (ref 4.2–5.9)
SODIUM BLD-SCNC: 140 MMOL/L (ref 136–145)
WBC # BLD: 5.5 K/UL (ref 4–11)

## 2019-08-13 PROCEDURE — 85610 PROTHROMBIN TIME: CPT

## 2019-08-13 PROCEDURE — 93010 ELECTROCARDIOGRAM REPORT: CPT | Performed by: INTERNAL MEDICINE

## 2019-08-13 PROCEDURE — 93005 ELECTROCARDIOGRAM TRACING: CPT | Performed by: INTERNAL MEDICINE

## 2019-08-13 PROCEDURE — 2500000003 HC RX 250 WO HCPCS

## 2019-08-13 PROCEDURE — 85025 COMPLETE CBC W/AUTO DIFF WBC: CPT

## 2019-08-13 PROCEDURE — 6360000002 HC RX W HCPCS

## 2019-08-13 PROCEDURE — 80048 BASIC METABOLIC PNL TOTAL CA: CPT

## 2019-08-13 PROCEDURE — 92960 CARDIOVERSION ELECTRIC EXT: CPT | Performed by: INTERNAL MEDICINE

## 2019-08-13 PROCEDURE — 2580000003 HC RX 258

## 2019-08-13 PROCEDURE — 92960 CARDIOVERSION ELECTRIC EXT: CPT

## 2019-08-13 PROCEDURE — 3700000000 HC ANESTHESIA ATTENDED CARE

## 2019-08-13 RX ORDER — PROPOFOL 10 MG/ML
INJECTION, EMULSION INTRAVENOUS PRN
Status: DISCONTINUED | OUTPATIENT
Start: 2019-08-13 | End: 2019-08-13 | Stop reason: SDUPTHER

## 2019-08-13 RX ORDER — LIDOCAINE HYDROCHLORIDE 20 MG/ML
INJECTION, SOLUTION INFILTRATION; PERINEURAL PRN
Status: DISCONTINUED | OUTPATIENT
Start: 2019-08-13 | End: 2019-08-13 | Stop reason: SDUPTHER

## 2019-08-13 RX ORDER — SODIUM CHLORIDE 9 MG/ML
1000 INJECTION, SOLUTION INTRAVENOUS CONTINUOUS
Status: DISCONTINUED | OUTPATIENT
Start: 2019-08-13 | End: 2019-08-13 | Stop reason: HOSPADM

## 2019-08-13 RX ADMIN — PROPOFOL 50 MG: 10 INJECTION, EMULSION INTRAVENOUS at 09:00

## 2019-08-13 RX ADMIN — LIDOCAINE HYDROCHLORIDE 100 MG: 20 INJECTION, SOLUTION INFILTRATION; PERINEURAL at 09:00

## 2019-08-13 RX ADMIN — PROPOFOL 25 MG: 10 INJECTION, EMULSION INTRAVENOUS at 09:01

## 2019-08-13 RX ADMIN — PROPOFOL 25 MG: 10 INJECTION, EMULSION INTRAVENOUS at 09:02

## 2019-08-15 NOTE — ANESTHESIA PRE PROCEDURE
Applicable): No results found for: PREGTESTUR, PREGSERUM, HCG, HCGQUANT     ABGs: No results found for: PHART, PO2ART, YLP6JLF, ZIZ4KIZ, BEART, [id number]     Type & Screen (If Applicable):  No results found for: LABABO, [address]    Anesthesia Evaluation  Patient summary reviewed no history of anesthetic complications:   Airway: Mallampati: III  TM distance: >3 FB   Neck ROM: full  Mouth opening: > = 3 FB Dental:          Pulmonary:   (+) sleep apnea:                             Cardiovascular:    (+) hypertension:, angina:, dysrhythmias: atrial fibrillation, CHF:,                   Neuro/Psych:   Negative Neuro/Psych ROS              GI/Hepatic/Renal: Neg GI/Hepatic/Renal ROS       (-) GERD, liver disease and no renal disease       Endo/Other: Negative Endo/Other ROS       (-) diabetes mellitus               Abdominal:           Vascular: negative vascular ROS. Anesthesia Plan      TIVA     ASA 3       Induction: intravenous. Anesthetic plan and risks discussed with patient. Plan discussed with CRNA. All questions answered and agrees with plan.         Carolina Schulte MD   8/15/2019

## 2019-09-05 ENCOUNTER — HOSPITAL ENCOUNTER (OUTPATIENT)
Age: 64
Discharge: HOME | End: 2019-09-05
Payer: COMMERCIAL

## 2019-09-05 DIAGNOSIS — N18.30 CKD (CHRONIC KIDNEY DISEASE), STAGE III (HCC): ICD-10-CM

## 2019-09-05 LAB
ALBUMIN SERPL-MCNC: 3.9 G/DL (ref 3.4–5)
ANION GAP SERPL CALCULATED.3IONS-SCNC: 16 MMOL/L (ref 3–16)
BACTERIA: ABNORMAL /HPF
BUN BLDV-MCNC: 30 MG/DL (ref 7–20)
CALCIUM SERPL-MCNC: 9.5 MG/DL (ref 8.3–10.6)
CHLORIDE BLD-SCNC: 101 MMOL/L (ref 99–110)
CO2: 23 MMOL/L (ref 21–32)
CREAT SERPL-MCNC: 1.5 MG/DL (ref 0.8–1.3)
EPITHELIAL CELLS, UA: ABNORMAL /HPF
GFR AFRICAN AMERICAN: 57
GFR NON-AFRICAN AMERICAN: 47
GLUCOSE BLD-MCNC: 109 MG/DL (ref 70–99)
PHOSPHORUS: 3.9 MG/DL (ref 2.5–4.9)
POTASSIUM SERPL-SCNC: 4.2 MMOL/L (ref 3.5–5.1)
RBC UA: ABNORMAL /HPF (ref 0–2)
SODIUM BLD-SCNC: 140 MMOL/L (ref 136–145)
WBC UA: >100 /HPF (ref 0–5)

## 2019-09-05 PROCEDURE — 81015 MICROSCOPIC EXAM OF URINE: CPT

## 2019-09-05 PROCEDURE — 80069 RENAL FUNCTION PANEL: CPT

## 2019-09-05 PROCEDURE — 36415 COLL VENOUS BLD VENIPUNCTURE: CPT

## 2019-09-09 RX ORDER — EPLERENONE 25 MG/1
TABLET, FILM COATED ORAL
Qty: 90 TABLET | Refills: 3 | Status: SHIPPED | OUTPATIENT
Start: 2019-09-09 | End: 2020-09-08 | Stop reason: SDUPTHER

## 2019-09-10 RX ORDER — ISOSORBIDE MONONITRATE 30 MG/1
TABLET, EXTENDED RELEASE ORAL
Qty: 90 TABLET | Refills: 3 | Status: SHIPPED | OUTPATIENT
Start: 2019-09-10 | End: 2020-08-05 | Stop reason: SDUPTHER

## 2019-09-10 NOTE — TELEPHONE ENCOUNTER
7/19/19 NPBB  Plan:   1. Continue amiodarone, Coreg, Inspra, Lasix, hydralazine, Imdur, lisinopril and Xarelto   2. TSH, BMP, and LFT every six months while on amiodarone (TSH due 8/2019)  3. Echocardiogram for EF and LA size  4. LHC recommended by Dr. Kevin Rivas due to chest pain and history of abnormal stress test  5. Hold Xarelto for 48 hours prior to [facility]. Will order Lovenox bridge as patient is currently in afib.    6. Will consider cardioversion versus repeat ablation after LHC is complete    8/13/19 cbc, bmp

## 2019-09-17 DIAGNOSIS — I48.0 PAROXYSMAL ATRIAL FIBRILLATION (HCC): ICD-10-CM

## 2019-09-17 DIAGNOSIS — I48.3 TYPICAL ATRIAL FLUTTER (HCC): Primary | ICD-10-CM

## 2019-09-18 RX ORDER — HYDRALAZINE HYDROCHLORIDE 25 MG/1
TABLET, FILM COATED ORAL
Qty: 180 TABLET | Refills: 3 | Status: SHIPPED | OUTPATIENT
Start: 2019-09-18 | End: 2020-04-20 | Stop reason: SDUPTHER

## 2019-09-18 RX ORDER — AMIODARONE HYDROCHLORIDE 200 MG/1
TABLET ORAL
Qty: 45 TABLET | Refills: 0 | Status: SHIPPED | OUTPATIENT
Start: 2019-09-18 | End: 2019-12-11 | Stop reason: SDUPTHER

## 2019-09-18 NOTE — TELEPHONE ENCOUNTER
7/19/2019   Assessment:   1. Symptomatic atrial fibrillation of unknown duration: recurrent              -s/p RFCA 8/2015 (no known recurrence until 12/2016)              -s/p DCCV on 1/10/2017, recurrent 7/2019              -[id number] score 3 (HTN, age, and CHF)  2. Sinus bradycardia: stable and historically asymptomatic  3. HTN: controlled   4. Mild LV dysfunction: EF 45-50%  5. Chronic diastolic CHF:  compensated  6. CKD stage III: Followed by Dr. Wilber Bahena  7. JESSICA: on CPAP, compliant   8. Chest pain: new problem     Plan:   1. Continue amiodarone, Coreg, Inspra, Lasix, hydralazine, Imdur, lisinopril and Xarelto   2. TSH, BMP, and LFT every six months while on amiodarone (TSH due 8/2019)  3. Echocardiogram for EF and LA size  4. LHC recommended by Dr. Kimi Felix due to chest pain and history of abnormal stress test  5. Hold Xarelto for 48 hours prior to [facility]. Will order Lovenox bridge as patient is currently in afib. 6. Will consider cardioversion versus repeat ablation after LHC is complete     Patient was seen in office in conjunction with Dr. Kimi Felix. 8/13/19   CONCLUSION: Successful direct current cardioversion. PLAN:   1. Continue anticoagulation . 2. Continue current medications  3. Follow up as scheduled. Last BMP 9/5/19  Last CMP 7/12/19  Last TSH 2/4/19    Repeat TSH ordered. Patient needs to be notified to have it drawn. Next blood draw for BMP scheduled for January.

## 2019-10-02 ENCOUNTER — HOSPITAL ENCOUNTER (OUTPATIENT)
Age: 64
Discharge: HOME | End: 2019-10-02
Payer: COMMERCIAL

## 2019-10-02 ENCOUNTER — OFFICE VISIT (OUTPATIENT)
Dept: CARDIOLOGY CLINIC | Age: 64
End: 2019-10-02
Payer: COMMERCIAL

## 2019-10-02 ENCOUNTER — HOSPITAL ENCOUNTER (OUTPATIENT)
Dept: CARDIOLOGY | Age: 64
Discharge: HOME | End: 2019-10-02
Payer: COMMERCIAL

## 2019-10-02 VITALS
DIASTOLIC BLOOD PRESSURE: 74 MMHG | BODY MASS INDEX: 40.43 KG/M2 | WEIGHT: 315 LBS | HEIGHT: 74 IN | SYSTOLIC BLOOD PRESSURE: 106 MMHG | HEART RATE: 54 BPM

## 2019-10-02 DIAGNOSIS — I48.0 PAROXYSMAL ATRIAL FIBRILLATION (HCC): ICD-10-CM

## 2019-10-02 DIAGNOSIS — I48.0 PAROXYSMAL ATRIAL FIBRILLATION (HCC): Primary | ICD-10-CM

## 2019-10-02 DIAGNOSIS — I42.0 DILATED CARDIOMYOPATHY (HCC): ICD-10-CM

## 2019-10-02 LAB — TSH REFLEX FT4: 0.97 UIU/ML (ref 0.27–4.2)

## 2019-10-02 PROCEDURE — 36415 COLL VENOUS BLD VENIPUNCTURE: CPT

## 2019-10-02 PROCEDURE — 93308 TTE F-UP OR LMTD: CPT

## 2019-10-02 PROCEDURE — 93000 ELECTROCARDIOGRAM COMPLETE: CPT | Performed by: INTERNAL MEDICINE

## 2019-10-02 PROCEDURE — 99215 OFFICE O/P EST HI 40 MIN: CPT | Performed by: INTERNAL MEDICINE

## 2019-10-02 PROCEDURE — 84443 ASSAY THYROID STIM HORMONE: CPT

## 2019-10-21 ENCOUNTER — TELEPHONE (OUTPATIENT)
Dept: CARDIOLOGY CLINIC | Age: 64
End: 2019-10-21

## 2019-11-20 ENCOUNTER — TELEPHONE (OUTPATIENT)
Dept: CARDIOLOGY CLINIC | Age: 64
End: 2019-11-20

## 2019-11-22 ENCOUNTER — ANESTHESIA EVENT (OUTPATIENT)
Dept: CARDIAC CATH/INVASIVE PROCEDURES | Age: 64
End: 2019-11-22
Payer: COMMERCIAL

## 2019-11-25 ENCOUNTER — HOSPITAL ENCOUNTER (OUTPATIENT)
Dept: CARDIAC CATH/INVASIVE PROCEDURES | Age: 64
Setting detail: OBSERVATION
Discharge: HOME | End: 2019-11-26
Attending: INTERNAL MEDICINE | Admitting: INTERNAL MEDICINE
Payer: COMMERCIAL

## 2019-11-25 ENCOUNTER — ANESTHESIA (OUTPATIENT)
Dept: CARDIAC CATH/INVASIVE PROCEDURES | Age: 64
End: 2019-11-25
Payer: COMMERCIAL

## 2019-11-25 VITALS
OXYGEN SATURATION: 96 % | SYSTOLIC BLOOD PRESSURE: 102 MMHG | TEMPERATURE: 97.2 F | DIASTOLIC BLOOD PRESSURE: 80 MMHG | RESPIRATION RATE: 3 BRPM

## 2019-11-25 LAB
A/G RATIO: 1.7 (ref 1.1–2.2)
ALBUMIN SERPL-MCNC: 4.2 G/DL (ref 3.4–5)
ALP BLD-CCNC: 75 U/L (ref 40–129)
ALT SERPL-CCNC: 19 U/L (ref 10–40)
ANION GAP SERPL CALCULATED.3IONS-SCNC: 12 MMOL/L (ref 3–16)
AST SERPL-CCNC: 14 U/L (ref 15–37)
BACTERIA: ABNORMAL /HPF
BILIRUB SERPL-MCNC: 0.6 MG/DL (ref 0–1)
BILIRUBIN URINE: NEGATIVE
BLOOD, URINE: ABNORMAL
BUN BLDV-MCNC: 34 MG/DL (ref 7–20)
CALCIUM SERPL-MCNC: 9.4 MG/DL (ref 8.3–10.6)
CHLORIDE BLD-SCNC: 106 MMOL/L (ref 99–110)
CLARITY: CLEAR
CO2: 24 MMOL/L (ref 21–32)
COLOR: YELLOW
CREAT SERPL-MCNC: 1.4 MG/DL (ref 0.8–1.3)
EKG ATRIAL RATE: 60 BPM
EKG DIAGNOSIS: NORMAL
EKG P AXIS: 63 DEGREES
EKG P-R INTERVAL: 176 MS
EKG Q-T INTERVAL: 448 MS
EKG QRS DURATION: 92 MS
EKG QTC CALCULATION (BAZETT): 448 MS
EKG R AXIS: -17 DEGREES
EKG T AXIS: 6 DEGREES
EKG VENTRICULAR RATE: 60 BPM
EPITHELIAL CELLS, UA: ABNORMAL /HPF
GFR AFRICAN AMERICAN: >60
GFR NON-AFRICAN AMERICAN: 51
GLOBULIN: 2.5 G/DL
GLUCOSE BLD-MCNC: 109 MG/DL (ref 70–99)
GLUCOSE URINE: NEGATIVE MG/DL
HCT VFR BLD CALC: 42.2 % (ref 40.5–52.5)
HEMOGLOBIN: 13.8 G/DL (ref 13.5–17.5)
INR BLD: 1.17 (ref 0.86–1.14)
KETONES, URINE: NEGATIVE MG/DL
LEUKOCYTE ESTERASE, URINE: ABNORMAL
MCH RBC QN AUTO: 28.6 PG (ref 26–34)
MCHC RBC AUTO-ENTMCNC: 32.8 G/DL (ref 31–36)
MCV RBC AUTO: 87.3 FL (ref 80–100)
MICROSCOPIC EXAMINATION: YES
NITRITE, URINE: POSITIVE
PDW BLD-RTO: 14.2 % (ref 12.4–15.4)
PH UA: 5.5 (ref 5–8)
PLATELET # BLD: 253 K/UL (ref 135–450)
PMV BLD AUTO: 7.9 FL (ref 5–10.5)
POC ACT LR: 203 SEC
POC ACT LR: 224 SEC
POC ACT LR: 251 SEC
POC ACT LR: 267 SEC
POC ACT LR: 394 SEC
POC ACT LR: >400 SEC
POTASSIUM SERPL-SCNC: 4.4 MMOL/L (ref 3.5–5.1)
PROTEIN UA: NEGATIVE MG/DL
PROTHROMBIN TIME: 13.6 SEC (ref 10–13.2)
RBC # BLD: 4.83 M/UL (ref 4.2–5.9)
RBC UA: ABNORMAL /HPF (ref 0–2)
SODIUM BLD-SCNC: 142 MMOL/L (ref 136–145)
SPECIFIC GRAVITY UA: 1.02 (ref 1–1.03)
TOTAL PROTEIN: 6.7 G/DL (ref 6.4–8.2)
URINE REFLEX TO CULTURE: YES
URINE TYPE: ABNORMAL
UROBILINOGEN, URINE: 0.2 E.U./DL
WBC # BLD: 6.6 K/UL (ref 4–11)
WBC UA: ABNORMAL /HPF (ref 0–5)

## 2019-11-25 PROCEDURE — 93657 TX L/R ATRIAL FIB ADDL: CPT

## 2019-11-25 PROCEDURE — C1759 CATH, INTRA ECHOCARDIOGRAPHY: HCPCS

## 2019-11-25 PROCEDURE — 93662 INTRACARDIAC ECG (ICE): CPT | Performed by: INTERNAL MEDICINE

## 2019-11-25 PROCEDURE — 2500000003 HC RX 250 WO HCPCS

## 2019-11-25 PROCEDURE — 85610 PROTHROMBIN TIME: CPT

## 2019-11-25 PROCEDURE — 80053 COMPREHEN METABOLIC PANEL: CPT

## 2019-11-25 PROCEDURE — 3700000001 HC ADD 15 MINUTES (ANESTHESIA)

## 2019-11-25 PROCEDURE — 6370000000 HC RX 637 (ALT 250 FOR IP): Performed by: INTERNAL MEDICINE

## 2019-11-25 PROCEDURE — 2720000010 HC SURG SUPPLY STERILE

## 2019-11-25 PROCEDURE — 93623 PRGRMD STIMJ&PACG IV RX NFS: CPT

## 2019-11-25 PROCEDURE — 2709999900 HC NON-CHARGEABLE SUPPLY

## 2019-11-25 PROCEDURE — G0378 HOSPITAL OBSERVATION PER HR: HCPCS

## 2019-11-25 PROCEDURE — 93656 COMPRE EP EVAL ABLTJ ATR FIB: CPT

## 2019-11-25 PROCEDURE — 6370000000 HC RX 637 (ALT 250 FOR IP): Performed by: NURSE PRACTITIONER

## 2019-11-25 PROCEDURE — 7100000001 HC PACU RECOVERY - ADDTL 15 MIN

## 2019-11-25 PROCEDURE — 93662 INTRACARDIAC ECG (ICE): CPT

## 2019-11-25 PROCEDURE — 81001 URINALYSIS AUTO W/SCOPE: CPT

## 2019-11-25 PROCEDURE — 93656 COMPRE EP EVAL ABLTJ ATR FIB: CPT | Performed by: INTERNAL MEDICINE

## 2019-11-25 PROCEDURE — 93655 ICAR CATH ABLTJ DSCRT ARRHYT: CPT | Performed by: INTERNAL MEDICINE

## 2019-11-25 PROCEDURE — 7100000000 HC PACU RECOVERY - FIRST 15 MIN

## 2019-11-25 PROCEDURE — C1730 CATH, EP, 19 OR FEW ELECT: HCPCS

## 2019-11-25 PROCEDURE — C1769 GUIDE WIRE: HCPCS

## 2019-11-25 PROCEDURE — 87086 URINE CULTURE/COLONY COUNT: CPT

## 2019-11-25 PROCEDURE — 87077 CULTURE AEROBIC IDENTIFY: CPT

## 2019-11-25 PROCEDURE — 2580000003 HC RX 258: Performed by: NURSE PRACTITIONER

## 2019-11-25 PROCEDURE — 87186 SC STD MICRODIL/AGAR DIL: CPT

## 2019-11-25 PROCEDURE — 2580000003 HC RX 258

## 2019-11-25 PROCEDURE — C1894 INTRO/SHEATH, NON-LASER: HCPCS

## 2019-11-25 PROCEDURE — 6360000002 HC RX W HCPCS

## 2019-11-25 PROCEDURE — 85027 COMPLETE CBC AUTOMATED: CPT

## 2019-11-25 PROCEDURE — 93613 INTRACARDIAC EPHYS 3D MAPG: CPT

## 2019-11-25 PROCEDURE — C1732 CATH, EP, DIAG/ABL, 3D/VECT: HCPCS

## 2019-11-25 PROCEDURE — 85347 COAGULATION TIME ACTIVATED: CPT

## 2019-11-25 PROCEDURE — 93613 INTRACARDIAC EPHYS 3D MAPG: CPT | Performed by: INTERNAL MEDICINE

## 2019-11-25 PROCEDURE — 3700000000 HC ANESTHESIA ATTENDED CARE

## 2019-11-25 PROCEDURE — 93005 ELECTROCARDIOGRAM TRACING: CPT | Performed by: INTERNAL MEDICINE

## 2019-11-25 RX ORDER — LIDOCAINE HYDROCHLORIDE 20 MG/ML
INJECTION, SOLUTION INFILTRATION; PERINEURAL PRN
Status: DISCONTINUED | OUTPATIENT
Start: 2019-11-25 | End: 2019-11-25 | Stop reason: SDUPTHER

## 2019-11-25 RX ORDER — HYDRALAZINE HYDROCHLORIDE 25 MG/1
25 TABLET, FILM COATED ORAL 2 TIMES DAILY
Status: DISCONTINUED | OUTPATIENT
Start: 2019-11-26 | End: 2019-11-25 | Stop reason: SDUPTHER

## 2019-11-25 RX ORDER — HEPARIN SODIUM 1000 [USP'U]/ML
INJECTION, SOLUTION INTRAVENOUS; SUBCUTANEOUS
Status: COMPLETED | OUTPATIENT
Start: 2019-11-25 | End: 2019-11-25

## 2019-11-25 RX ORDER — CARVEDILOL 6.25 MG/1
6.25 TABLET ORAL 2 TIMES DAILY WITH MEALS
Status: DISCONTINUED | OUTPATIENT
Start: 2019-11-25 | End: 2019-11-26 | Stop reason: HOSPADM

## 2019-11-25 RX ORDER — EPLERENONE 25 MG/1
25 TABLET, FILM COATED ORAL DAILY
Status: DISCONTINUED | OUTPATIENT
Start: 2019-11-26 | End: 2019-11-26 | Stop reason: HOSPADM

## 2019-11-25 RX ORDER — ONDANSETRON 2 MG/ML
INJECTION INTRAMUSCULAR; INTRAVENOUS PRN
Status: DISCONTINUED | OUTPATIENT
Start: 2019-11-25 | End: 2019-11-25 | Stop reason: SDUPTHER

## 2019-11-25 RX ORDER — FUROSEMIDE 40 MG/1
40 TABLET ORAL DAILY
Status: DISCONTINUED | OUTPATIENT
Start: 2019-11-26 | End: 2019-11-26 | Stop reason: HOSPADM

## 2019-11-25 RX ORDER — ACETAMINOPHEN 325 MG/1
650 TABLET ORAL EVERY 4 HOURS PRN
Status: DISCONTINUED | OUTPATIENT
Start: 2019-11-25 | End: 2019-11-26 | Stop reason: HOSPADM

## 2019-11-25 RX ORDER — SODIUM CHLORIDE 9 MG/ML
INJECTION, SOLUTION INTRAVENOUS CONTINUOUS PRN
Status: DISCONTINUED | OUTPATIENT
Start: 2019-11-25 | End: 2019-11-25 | Stop reason: SDUPTHER

## 2019-11-25 RX ORDER — HYDRALAZINE HYDROCHLORIDE 25 MG/1
25 TABLET, FILM COATED ORAL 2 TIMES DAILY
Status: DISCONTINUED | OUTPATIENT
Start: 2019-11-25 | End: 2019-11-26 | Stop reason: HOSPADM

## 2019-11-25 RX ORDER — PROPOFOL 10 MG/ML
INJECTION, EMULSION INTRAVENOUS PRN
Status: DISCONTINUED | OUTPATIENT
Start: 2019-11-25 | End: 2019-11-25 | Stop reason: SDUPTHER

## 2019-11-25 RX ORDER — SODIUM CHLORIDE 0.9 % (FLUSH) 0.9 %
10 SYRINGE (ML) INJECTION EVERY 12 HOURS SCHEDULED
Status: DISCONTINUED | OUTPATIENT
Start: 2019-11-25 | End: 2019-11-26 | Stop reason: HOSPADM

## 2019-11-25 RX ORDER — DEXAMETHASONE SODIUM PHOSPHATE 4 MG/ML
INJECTION, SOLUTION INTRA-ARTICULAR; INTRALESIONAL; INTRAMUSCULAR; INTRAVENOUS; SOFT TISSUE PRN
Status: DISCONTINUED | OUTPATIENT
Start: 2019-11-25 | End: 2019-11-25 | Stop reason: SDUPTHER

## 2019-11-25 RX ORDER — PRAVASTATIN SODIUM 40 MG
40 TABLET ORAL NIGHTLY
Status: DISCONTINUED | OUTPATIENT
Start: 2019-11-25 | End: 2019-11-26 | Stop reason: HOSPADM

## 2019-11-25 RX ORDER — ONDANSETRON 2 MG/ML
4 INJECTION INTRAMUSCULAR; INTRAVENOUS EVERY 6 HOURS PRN
Status: DISCONTINUED | OUTPATIENT
Start: 2019-11-25 | End: 2019-11-26 | Stop reason: HOSPADM

## 2019-11-25 RX ORDER — SODIUM CHLORIDE 0.9 % (FLUSH) 0.9 %
10 SYRINGE (ML) INJECTION PRN
Status: DISCONTINUED | OUTPATIENT
Start: 2019-11-25 | End: 2019-11-26 | Stop reason: HOSPADM

## 2019-11-25 RX ORDER — ROCURONIUM BROMIDE 10 MG/ML
INJECTION, SOLUTION INTRAVENOUS PRN
Status: DISCONTINUED | OUTPATIENT
Start: 2019-11-25 | End: 2019-11-25 | Stop reason: SDUPTHER

## 2019-11-25 RX ORDER — AMIODARONE HYDROCHLORIDE 200 MG/1
100 TABLET ORAL DAILY
Status: DISCONTINUED | OUTPATIENT
Start: 2019-11-25 | End: 2019-11-26 | Stop reason: HOSPADM

## 2019-11-25 RX ORDER — ISOSORBIDE MONONITRATE 30 MG/1
30 TABLET, EXTENDED RELEASE ORAL DAILY
Status: DISCONTINUED | OUTPATIENT
Start: 2019-11-26 | End: 2019-11-26 | Stop reason: HOSPADM

## 2019-11-25 RX ORDER — LISINOPRIL 20 MG/1
20 TABLET ORAL 2 TIMES DAILY
Status: DISCONTINUED | OUTPATIENT
Start: 2019-11-25 | End: 2019-11-25 | Stop reason: SDUPTHER

## 2019-11-25 RX ORDER — LISINOPRIL 20 MG/1
20 TABLET ORAL 2 TIMES DAILY
Status: DISCONTINUED | OUTPATIENT
Start: 2019-11-25 | End: 2019-11-26 | Stop reason: HOSPADM

## 2019-11-25 RX ORDER — HEPARIN SODIUM 10000 [USP'U]/100ML
INJECTION, SOLUTION INTRAVENOUS CONTINUOUS PRN
Status: COMPLETED | OUTPATIENT
Start: 2019-11-25 | End: 2019-11-25

## 2019-11-25 RX ORDER — SODIUM CHLORIDE 9 MG/ML
INJECTION, SOLUTION INTRAVENOUS ONCE
Status: COMPLETED | OUTPATIENT
Start: 2019-11-25 | End: 2019-11-25

## 2019-11-25 RX ORDER — METOPROLOL TARTRATE 5 MG/5ML
INJECTION INTRAVENOUS
Status: COMPLETED | OUTPATIENT
Start: 2019-11-25 | End: 2019-11-25

## 2019-11-25 RX ADMIN — ROCURONIUM BROMIDE 50 MG: 10 SOLUTION INTRAVENOUS at 10:11

## 2019-11-25 RX ADMIN — LIDOCAINE HYDROCHLORIDE 100 MG: 20 INJECTION, SOLUTION INFILTRATION; PERINEURAL at 10:10

## 2019-11-25 RX ADMIN — HEPARIN SODIUM 2000 UNITS/HR: 10000 INJECTION, SOLUTION INTRAVENOUS at 10:57

## 2019-11-25 RX ADMIN — DEXAMETHASONE SODIUM PHOSPHATE 8 MG: 4 INJECTION, SOLUTION INTRAMUSCULAR; INTRAVENOUS at 10:10

## 2019-11-25 RX ADMIN — CARVEDILOL 6.25 MG: 6.25 TABLET, FILM COATED ORAL at 18:39

## 2019-11-25 RX ADMIN — AMIODARONE HYDROCHLORIDE 100 MG: 200 TABLET ORAL at 18:39

## 2019-11-25 RX ADMIN — PROPOFOL 200 MG: 10 INJECTION, EMULSION INTRAVENOUS at 10:12

## 2019-11-25 RX ADMIN — METOPROLOL TARTRATE 5 MG: 5 INJECTION INTRAVENOUS at 13:00

## 2019-11-25 RX ADMIN — PHENYLEPHRINE HYDROCHLORIDE 50 MCG/MIN: 10 INJECTION INTRAVENOUS at 10:20

## 2019-11-25 RX ADMIN — ONDANSETRON 4 MG: 2 INJECTION INTRAMUSCULAR; INTRAVENOUS at 10:10

## 2019-11-25 RX ADMIN — HEPARIN SODIUM 5000 UNITS: 1000 INJECTION, SOLUTION INTRAVENOUS; SUBCUTANEOUS at 10:56

## 2019-11-25 RX ADMIN — HEPARIN SODIUM 5000 UNITS: 1000 INJECTION, SOLUTION INTRAVENOUS; SUBCUTANEOUS at 11:21

## 2019-11-25 RX ADMIN — LISINOPRIL 20 MG: 20 TABLET ORAL at 18:39

## 2019-11-25 RX ADMIN — HEPARIN SODIUM 10000 UNITS: 1000 INJECTION, SOLUTION INTRAVENOUS; SUBCUTANEOUS at 11:42

## 2019-11-25 RX ADMIN — HEPARIN SODIUM 10000 UNITS: 1000 INJECTION, SOLUTION INTRAVENOUS; SUBCUTANEOUS at 10:30

## 2019-11-25 RX ADMIN — SUGAMMADEX 100 MG: 100 INJECTION, SOLUTION INTRAVENOUS at 13:26

## 2019-11-25 RX ADMIN — Medication 10 ML: at 20:51

## 2019-11-25 RX ADMIN — RIVAROXABAN 20 MG: 20 TABLET, FILM COATED ORAL at 20:55

## 2019-11-25 RX ADMIN — HYDRALAZINE HYDROCHLORIDE 25 MG: 25 TABLET, FILM COATED ORAL at 18:39

## 2019-11-25 RX ADMIN — SODIUM CHLORIDE: 9 INJECTION, SOLUTION INTRAVENOUS at 07:23

## 2019-11-25 RX ADMIN — SODIUM CHLORIDE: 9 INJECTION, SOLUTION INTRAVENOUS at 09:30

## 2019-11-25 RX ADMIN — PROPOFOL 200 MG: 10 INJECTION, EMULSION INTRAVENOUS at 10:10

## 2019-11-25 RX ADMIN — PRAVASTATIN SODIUM 40 MG: 40 TABLET ORAL at 20:51

## 2019-11-25 ASSESSMENT — PULMONARY FUNCTION TESTS
PIF_VALUE: 1
PIF_VALUE: 31
PIF_VALUE: 1
PIF_VALUE: 30
PIF_VALUE: 2
PIF_VALUE: 30
PIF_VALUE: 30
PIF_VALUE: 29
PIF_VALUE: 30
PIF_VALUE: 31
PIF_VALUE: 23
PIF_VALUE: 30
PIF_VALUE: 31
PIF_VALUE: 30
PIF_VALUE: 31
PIF_VALUE: 30
PIF_VALUE: 21
PIF_VALUE: 30
PIF_VALUE: 1
PIF_VALUE: 30
PIF_VALUE: 31
PIF_VALUE: 30
PIF_VALUE: 31
PIF_VALUE: 31
PIF_VALUE: 30
PIF_VALUE: 2
PIF_VALUE: 29
PIF_VALUE: 29
PIF_VALUE: 30
PIF_VALUE: 32
PIF_VALUE: 29
PIF_VALUE: 30
PIF_VALUE: 30
PIF_VALUE: 29
PIF_VALUE: 1
PIF_VALUE: 30
PIF_VALUE: 30
PIF_VALUE: 4
PIF_VALUE: 0
PIF_VALUE: 29
PIF_VALUE: 30
PIF_VALUE: 1
PIF_VALUE: 30
PIF_VALUE: 31
PIF_VALUE: 30
PIF_VALUE: 31
PIF_VALUE: 30
PIF_VALUE: 29
PIF_VALUE: 31
PIF_VALUE: 29
PIF_VALUE: 31
PIF_VALUE: 29
PIF_VALUE: 30
PIF_VALUE: 29
PIF_VALUE: 30
PIF_VALUE: 29
PIF_VALUE: 30
PIF_VALUE: 28
PIF_VALUE: 1
PIF_VALUE: 6
PIF_VALUE: 30
PIF_VALUE: 13
PIF_VALUE: 31
PIF_VALUE: 31
PIF_VALUE: 30
PIF_VALUE: 3
PIF_VALUE: 29
PIF_VALUE: 30
PIF_VALUE: 29
PIF_VALUE: 30
PIF_VALUE: 14
PIF_VALUE: 30
PIF_VALUE: 28
PIF_VALUE: 29
PIF_VALUE: 30
PIF_VALUE: 31
PIF_VALUE: 31
PIF_VALUE: 37
PIF_VALUE: 30
PIF_VALUE: 1
PIF_VALUE: 6
PIF_VALUE: 30
PIF_VALUE: 30
PIF_VALUE: 29
PIF_VALUE: 31
PIF_VALUE: 29
PIF_VALUE: 31
PIF_VALUE: 31
PIF_VALUE: 30
PIF_VALUE: 30
PIF_VALUE: 29
PIF_VALUE: 30
PIF_VALUE: 31
PIF_VALUE: 30
PIF_VALUE: 31
PIF_VALUE: 31
PIF_VALUE: 30
PIF_VALUE: 3
PIF_VALUE: 1
PIF_VALUE: 30
PIF_VALUE: 29
PIF_VALUE: 30
PIF_VALUE: 29
PIF_VALUE: 30
PIF_VALUE: 0
PIF_VALUE: 30
PIF_VALUE: 1
PIF_VALUE: 31
PIF_VALUE: 31
PIF_VALUE: 30
PIF_VALUE: 3
PIF_VALUE: 29
PIF_VALUE: 30
PIF_VALUE: 31
PIF_VALUE: 31
PIF_VALUE: 29
PIF_VALUE: 29
PIF_VALUE: 31
PIF_VALUE: 30
PIF_VALUE: 4
PIF_VALUE: 31
PIF_VALUE: 29
PIF_VALUE: 30
PIF_VALUE: 1
PIF_VALUE: 31
PIF_VALUE: 29
PIF_VALUE: 29
PIF_VALUE: 30
PIF_VALUE: 4
PIF_VALUE: 30
PIF_VALUE: 0
PIF_VALUE: 31
PIF_VALUE: 30
PIF_VALUE: 29
PIF_VALUE: 3
PIF_VALUE: 29
PIF_VALUE: 30
PIF_VALUE: 30
PIF_VALUE: 29
PIF_VALUE: 30
PIF_VALUE: 1
PIF_VALUE: 30
PIF_VALUE: 31
PIF_VALUE: 29
PIF_VALUE: 30
PIF_VALUE: 29
PIF_VALUE: 30
PIF_VALUE: 1
PIF_VALUE: 30
PIF_VALUE: 31
PIF_VALUE: 30
PIF_VALUE: 31
PIF_VALUE: 29
PIF_VALUE: 5
PIF_VALUE: 30
PIF_VALUE: 1
PIF_VALUE: 30
PIF_VALUE: 35
PIF_VALUE: 30
PIF_VALUE: 31
PIF_VALUE: 31
PIF_VALUE: 30
PIF_VALUE: 30
PIF_VALUE: 39
PIF_VALUE: 30
PIF_VALUE: 30
PIF_VALUE: 29
PIF_VALUE: 28
PIF_VALUE: 29
PIF_VALUE: 30
PIF_VALUE: 31
PIF_VALUE: 30
PIF_VALUE: 2
PIF_VALUE: 29
PIF_VALUE: 10
PIF_VALUE: 29
PIF_VALUE: 29
PIF_VALUE: 31
PIF_VALUE: 31

## 2019-11-25 ASSESSMENT — PAIN SCALES - GENERAL
PAINLEVEL_OUTOF10: 0
PAINLEVEL_OUTOF10: 0

## 2019-11-25 ASSESSMENT — LIFESTYLE VARIABLES: SMOKING_STATUS: 0

## 2019-11-26 VITALS
HEIGHT: 74 IN | BODY MASS INDEX: 40.43 KG/M2 | OXYGEN SATURATION: 94 % | RESPIRATION RATE: 18 BRPM | DIASTOLIC BLOOD PRESSURE: 70 MMHG | TEMPERATURE: 97.5 F | HEART RATE: 62 BPM | WEIGHT: 315 LBS | SYSTOLIC BLOOD PRESSURE: 125 MMHG

## 2019-11-26 PROCEDURE — G0378 HOSPITAL OBSERVATION PER HR: HCPCS

## 2019-11-26 PROCEDURE — 6370000000 HC RX 637 (ALT 250 FOR IP): Performed by: INTERNAL MEDICINE

## 2019-11-26 PROCEDURE — 6370000000 HC RX 637 (ALT 250 FOR IP): Performed by: NURSE PRACTITIONER

## 2019-11-26 PROCEDURE — 99217 PR OBSERVATION CARE DISCHARGE MANAGEMENT: CPT | Performed by: NURSE PRACTITIONER

## 2019-11-26 PROCEDURE — 2580000003 HC RX 258: Performed by: NURSE PRACTITIONER

## 2019-11-26 RX ORDER — NITROFURANTOIN 25; 75 MG/1; MG/1
100 CAPSULE ORAL EVERY 12 HOURS SCHEDULED
Qty: 20 CAPSULE | Refills: 0 | Status: SHIPPED | OUTPATIENT
Start: 2019-11-27 | End: 2019-12-07

## 2019-11-26 RX ORDER — NITROFURANTOIN 25; 75 MG/1; MG/1
100 CAPSULE ORAL EVERY 12 HOURS SCHEDULED
Qty: 20 CAPSULE | Refills: 0 | Status: CANCELLED | OUTPATIENT
Start: 2019-11-26 | End: 2019-12-06

## 2019-11-26 RX ORDER — NITROFURANTOIN 25; 75 MG/1; MG/1
100 CAPSULE ORAL EVERY 12 HOURS SCHEDULED
Status: DISCONTINUED | OUTPATIENT
Start: 2019-11-26 | End: 2019-11-26 | Stop reason: HOSPADM

## 2019-11-26 RX ORDER — NITROFURANTOIN 25; 75 MG/1; MG/1
100 CAPSULE ORAL EVERY 12 HOURS SCHEDULED
Status: DISCONTINUED | OUTPATIENT
Start: 2019-11-26 | End: 2019-11-26

## 2019-11-26 RX ADMIN — AMIODARONE HYDROCHLORIDE 100 MG: 200 TABLET ORAL at 09:17

## 2019-11-26 RX ADMIN — CARVEDILOL 6.25 MG: 6.25 TABLET, FILM COATED ORAL at 09:17

## 2019-11-26 RX ADMIN — NITROFURANTOIN (MONOHYDRATE/MACROCRYSTALS) 100 MG: 75; 25 CAPSULE ORAL at 13:36

## 2019-11-26 RX ADMIN — HYDRALAZINE HYDROCHLORIDE 25 MG: 25 TABLET, FILM COATED ORAL at 09:18

## 2019-11-26 RX ADMIN — FUROSEMIDE 40 MG: 40 TABLET ORAL at 09:17

## 2019-11-26 RX ADMIN — ISOSORBIDE MONONITRATE 30 MG: 30 TABLET, EXTENDED RELEASE ORAL at 09:18

## 2019-11-26 RX ADMIN — Medication 10 ML: at 09:18

## 2019-11-26 RX ADMIN — LISINOPRIL 20 MG: 20 TABLET ORAL at 09:18

## 2019-11-26 ASSESSMENT — PAIN SCALES - GENERAL
PAINLEVEL_OUTOF10: 0
PAINLEVEL_OUTOF10: 0

## 2019-11-27 ENCOUNTER — TELEPHONE (OUTPATIENT)
Dept: CARDIOLOGY CLINIC | Age: 64
End: 2019-11-27

## 2019-11-27 ENCOUNTER — TELEPHONE (OUTPATIENT)
Dept: FAMILY MEDICINE CLINIC | Age: 64
End: 2019-11-27

## 2019-11-27 LAB
ORGANISM: ABNORMAL
URINE CULTURE, ROUTINE: ABNORMAL

## 2019-12-03 ENCOUNTER — OFFICE VISIT (OUTPATIENT)
Dept: FAMILY MEDICINE CLINIC | Age: 64
End: 2019-12-03
Payer: COMMERCIAL

## 2019-12-03 VITALS — SYSTOLIC BLOOD PRESSURE: 130 MMHG | HEART RATE: 80 BPM | DIASTOLIC BLOOD PRESSURE: 82 MMHG | OXYGEN SATURATION: 97 %

## 2019-12-03 DIAGNOSIS — N18.30 CKD (CHRONIC KIDNEY DISEASE) STAGE 3, GFR 30-59 ML/MIN (HCC): ICD-10-CM

## 2019-12-03 DIAGNOSIS — I10 ESSENTIAL HYPERTENSION: ICD-10-CM

## 2019-12-03 DIAGNOSIS — Z12.11 SCREENING FOR COLON CANCER: ICD-10-CM

## 2019-12-03 DIAGNOSIS — N30.01 ACUTE CYSTITIS WITH HEMATURIA: ICD-10-CM

## 2019-12-03 DIAGNOSIS — I48.0 PAROXYSMAL ATRIAL FIBRILLATION (HCC): Primary | ICD-10-CM

## 2019-12-03 DIAGNOSIS — R73.9 HYPERGLYCEMIA: ICD-10-CM

## 2019-12-03 LAB — HBA1C MFR BLD: 5.7 %

## 2019-12-03 PROCEDURE — 1111F DSCHRG MED/CURRENT MED MERGE: CPT | Performed by: FAMILY MEDICINE

## 2019-12-03 PROCEDURE — 99495 TRANSJ CARE MGMT MOD F2F 14D: CPT | Performed by: FAMILY MEDICINE

## 2019-12-03 PROCEDURE — 83036 HEMOGLOBIN GLYCOSYLATED A1C: CPT | Performed by: FAMILY MEDICINE

## 2019-12-03 ASSESSMENT — PATIENT HEALTH QUESTIONNAIRE - PHQ9
1. LITTLE INTEREST OR PLEASURE IN DOING THINGS: 0
SUM OF ALL RESPONSES TO PHQ9 QUESTIONS 1 & 2: 0
SUM OF ALL RESPONSES TO PHQ QUESTIONS 1-9: 0
2. FEELING DOWN, DEPRESSED OR HOPELESS: 0
SUM OF ALL RESPONSES TO PHQ QUESTIONS 1-9: 0

## 2019-12-12 RX ORDER — AMIODARONE HYDROCHLORIDE 200 MG/1
TABLET ORAL
Qty: 45 TABLET | Refills: 1 | Status: SHIPPED | OUTPATIENT
Start: 2019-12-12 | End: 2020-04-20 | Stop reason: SDUPTHER

## 2019-12-20 ENCOUNTER — TELEPHONE (OUTPATIENT)
Dept: GASTROENTEROLOGY | Age: 64
End: 2019-12-20

## 2019-12-26 ENCOUNTER — OFFICE VISIT (OUTPATIENT)
Dept: CARDIOLOGY CLINIC | Age: 64
End: 2019-12-26
Payer: COMMERCIAL

## 2019-12-26 VITALS
HEIGHT: 74 IN | HEART RATE: 78 BPM | WEIGHT: 315 LBS | SYSTOLIC BLOOD PRESSURE: 120 MMHG | RESPIRATION RATE: 96 BRPM | DIASTOLIC BLOOD PRESSURE: 70 MMHG | BODY MASS INDEX: 40.43 KG/M2

## 2019-12-26 DIAGNOSIS — I10 ESSENTIAL HYPERTENSION: ICD-10-CM

## 2019-12-26 DIAGNOSIS — I48.0 PAROXYSMAL ATRIAL FIBRILLATION (HCC): Primary | ICD-10-CM

## 2019-12-26 DIAGNOSIS — I50.32 CHRONIC DIASTOLIC CONGESTIVE HEART FAILURE (HCC): ICD-10-CM

## 2019-12-26 DIAGNOSIS — I42.8 NONISCHEMIC CARDIOMYOPATHY (HCC): ICD-10-CM

## 2019-12-26 PROCEDURE — 99214 OFFICE O/P EST MOD 30 MIN: CPT | Performed by: NURSE PRACTITIONER

## 2019-12-26 PROCEDURE — 93000 ELECTROCARDIOGRAM COMPLETE: CPT | Performed by: NURSE PRACTITIONER

## 2020-01-09 ENCOUNTER — HOSPITAL ENCOUNTER (OUTPATIENT)
Age: 65
Discharge: HOME | End: 2020-01-09
Payer: COMMERCIAL

## 2020-01-09 LAB
ALBUMIN SERPL-MCNC: 4.1 G/DL (ref 3.4–5)
ANION GAP SERPL CALCULATED.3IONS-SCNC: 15 MMOL/L (ref 3–16)
BUN BLDV-MCNC: 27 MG/DL (ref 7–20)
CALCIUM SERPL-MCNC: 9.3 MG/DL (ref 8.3–10.6)
CHLORIDE BLD-SCNC: 101 MMOL/L (ref 99–110)
CO2: 25 MMOL/L (ref 21–32)
CREAT SERPL-MCNC: 1.4 MG/DL (ref 0.8–1.3)
CREATININE URINE: 56.2 MG/DL (ref 39–259)
GFR AFRICAN AMERICAN: >60
GFR NON-AFRICAN AMERICAN: 51
GLUCOSE BLD-MCNC: 97 MG/DL (ref 70–99)
MICROALBUMIN UR-MCNC: <1.2 MG/DL
MICROALBUMIN/CREAT UR-RTO: NORMAL MG/G (ref 0–30)
PHOSPHORUS: 3.9 MG/DL (ref 2.5–4.9)
POTASSIUM SERPL-SCNC: 4.3 MMOL/L (ref 3.5–5.1)
SODIUM BLD-SCNC: 141 MMOL/L (ref 136–145)

## 2020-01-09 PROCEDURE — 80069 RENAL FUNCTION PANEL: CPT

## 2020-01-09 PROCEDURE — 82570 ASSAY OF URINE CREATININE: CPT

## 2020-01-09 PROCEDURE — 82043 UR ALBUMIN QUANTITATIVE: CPT

## 2020-01-09 PROCEDURE — 36415 COLL VENOUS BLD VENIPUNCTURE: CPT

## 2020-01-22 ENCOUNTER — INITIAL CONSULT (OUTPATIENT)
Dept: GASTROENTEROLOGY | Age: 65
End: 2020-01-22
Payer: COMMERCIAL

## 2020-01-22 ENCOUNTER — TELEPHONE (OUTPATIENT)
Dept: GASTROENTEROLOGY | Age: 65
End: 2020-01-22

## 2020-01-22 VITALS — BODY MASS INDEX: 40.43 KG/M2 | WEIGHT: 315 LBS | HEIGHT: 74 IN

## 2020-01-22 PROCEDURE — 99203 OFFICE O/P NEW LOW 30 MIN: CPT | Performed by: INTERNAL MEDICINE

## 2020-01-22 RX ORDER — POLYETHYLENE GLYCOL 3350 17 G/17G
238 POWDER ORAL ONCE
Qty: 238 G | Refills: 0 | Status: SHIPPED | OUTPATIENT
Start: 2020-01-22 | End: 2020-01-22

## 2020-01-30 ENCOUNTER — ANESTHESIA (OUTPATIENT)
Dept: ENDOSCOPY | Age: 65
End: 2020-01-30
Payer: COMMERCIAL

## 2020-01-30 ENCOUNTER — HOSPITAL ENCOUNTER (OUTPATIENT)
Age: 65
Setting detail: OUTPATIENT SURGERY
Discharge: HOME | End: 2020-01-30
Attending: INTERNAL MEDICINE | Admitting: INTERNAL MEDICINE
Payer: COMMERCIAL

## 2020-01-30 ENCOUNTER — ANESTHESIA EVENT (OUTPATIENT)
Dept: ENDOSCOPY | Age: 65
End: 2020-01-30
Payer: COMMERCIAL

## 2020-01-30 VITALS
HEIGHT: 74 IN | DIASTOLIC BLOOD PRESSURE: 60 MMHG | BODY MASS INDEX: 40.43 KG/M2 | RESPIRATION RATE: 18 BRPM | SYSTOLIC BLOOD PRESSURE: 113 MMHG | WEIGHT: 315 LBS | TEMPERATURE: 98 F | OXYGEN SATURATION: 96 % | HEART RATE: 49 BPM

## 2020-01-30 VITALS — DIASTOLIC BLOOD PRESSURE: 123 MMHG | OXYGEN SATURATION: 90 % | SYSTOLIC BLOOD PRESSURE: 139 MMHG

## 2020-01-30 PROBLEM — K63.5 POLYP OF ASCENDING COLON: Status: ACTIVE | Noted: 2020-01-30

## 2020-01-30 PROBLEM — K57.30 DIVERTICULOSIS OF COLON: Status: ACTIVE | Noted: 2020-01-30

## 2020-01-30 PROBLEM — Z12.11 COLON CANCER SCREENING: Status: ACTIVE | Noted: 2020-01-30

## 2020-01-30 PROCEDURE — 3700000000 HC ANESTHESIA ATTENDED CARE: Performed by: INTERNAL MEDICINE

## 2020-01-30 PROCEDURE — 2709999900 HC NON-CHARGEABLE SUPPLY: Performed by: INTERNAL MEDICINE

## 2020-01-30 PROCEDURE — 7100000011 HC PHASE II RECOVERY - ADDTL 15 MIN: Performed by: INTERNAL MEDICINE

## 2020-01-30 PROCEDURE — 2580000003 HC RX 258: Performed by: INTERNAL MEDICINE

## 2020-01-30 PROCEDURE — 3700000001 HC ADD 15 MINUTES (ANESTHESIA): Performed by: INTERNAL MEDICINE

## 2020-01-30 PROCEDURE — 7100000010 HC PHASE II RECOVERY - FIRST 15 MIN: Performed by: INTERNAL MEDICINE

## 2020-01-30 PROCEDURE — 88305 TISSUE EXAM BY PATHOLOGIST: CPT

## 2020-01-30 PROCEDURE — 3609027000 HC COLONOSCOPY: Performed by: INTERNAL MEDICINE

## 2020-01-30 PROCEDURE — 45385 COLONOSCOPY W/LESION REMOVAL: CPT | Performed by: INTERNAL MEDICINE

## 2020-01-30 PROCEDURE — 3609010600 HC COLONOSCOPY POLYPECTOMY SNARE/COLD BIOPSY: Performed by: INTERNAL MEDICINE

## 2020-01-30 PROCEDURE — 3609010300 HC COLONOSCOPY W/BIOPSY SINGLE/MULTIPLE: Performed by: INTERNAL MEDICINE

## 2020-01-30 PROCEDURE — 6360000002 HC RX W HCPCS: Performed by: NURSE ANESTHETIST, CERTIFIED REGISTERED

## 2020-01-30 RX ORDER — PROPOFOL 10 MG/ML
INJECTION, EMULSION INTRAVENOUS PRN
Status: DISCONTINUED | OUTPATIENT
Start: 2020-01-30 | End: 2020-01-30 | Stop reason: SDUPTHER

## 2020-01-30 RX ORDER — SODIUM CHLORIDE, SODIUM LACTATE, POTASSIUM CHLORIDE, CALCIUM CHLORIDE 600; 310; 30; 20 MG/100ML; MG/100ML; MG/100ML; MG/100ML
INJECTION, SOLUTION INTRAVENOUS CONTINUOUS
Status: DISCONTINUED | OUTPATIENT
Start: 2020-01-30 | End: 2020-01-30 | Stop reason: HOSPADM

## 2020-01-30 RX ADMIN — SODIUM CHLORIDE, POTASSIUM CHLORIDE, SODIUM LACTATE AND CALCIUM CHLORIDE: 600; 310; 30; 20 INJECTION, SOLUTION INTRAVENOUS at 07:12

## 2020-01-30 RX ADMIN — PROPOFOL 350 MG: 10 INJECTION, EMULSION INTRAVENOUS at 08:15

## 2020-01-30 ASSESSMENT — PAIN SCALES - GENERAL
PAINLEVEL_OUTOF10: 0

## 2020-01-30 ASSESSMENT — PAIN - FUNCTIONAL ASSESSMENT: PAIN_FUNCTIONAL_ASSESSMENT: 0-10

## 2020-01-30 NOTE — ANESTHESIA PRE PROCEDURE
Department of Anesthesiology  Preprocedure Note       Name:  Robinson Osborne   Age:  75 y.o.  :  1955                                          MRN:  [id number]         Date:  2020      Surgeon: Gurpreet Becerra):  James Santiago MD    Procedure: COLONOSCOPY W/ ANESTHESIA -SLEEP APNEA- (N/A )    Medications prior to admission:   Prior to Admission medications    Medication Sig Start Date End Date Taking?  Authorizing Provider   amiodarone (CORDARONE) 200 MG tablet TAKE ONE-HALF TABLET BY  MOUTH DAILY 19  Yes WESTON Zepeda CNP   carvedilol (COREG) 6.25 MG tablet TAKE 1 TABLET BY MOUTH  TWICE A DAY WITH MEALS 19  Yes WESTON Zepeda CNP   hydrALAZINE (APRESOLINE) 25 MG tablet TAKE 1 TABLET BY MOUTH  TWICE A DAY 19  Yes WESTON Zepeda CNP   isosorbide mononitrate (IMDUR) 30 MG extended release tablet TAKE 1 TABLET BY MOUTH  EVERY DAY 9/10/19  Yes WESTON Zepeda CNP   eplerenone (INSPRA) 25 MG tablet TAKE 1 TABLET BY MOUTH  DAILY 19  Yes WESTON Zepeda CNP   pravastatin (PRAVACHOL) 40 MG tablet TAKE 1 TABLET BY MOUTH  DAILY 19  Yes Lou Pinzon MD   furosemide (LASIX) 40 MG tablet TAKE 1 TABLET BY MOUTH  DAILY IN THE MORNING 19  Yes WSETON Zepeda CNP   lisinopril (PRINIVIL;ZESTRIL) 20 MG tablet TAKE 1 TABLET BY MOUTH TWO  TIMES DAILY 19  Yes WESTON Zepeda CNP   rivaroxaban (XARELTO) 20 MG TABS tablet TAKE 1 TABLET BY MOUTH  EVERY DAY 9/10/19   WESTON Zepeda CNP       Current medications:    Current Facility-Administered Medications   Medication Dose Route Frequency Provider Last Rate Last Dose    lactated ringers infusion   Intravenous Continuous James Santiago MD 30 mL/hr at 20 0712         Allergies:  No Known Allergies    Problem List:    Patient Active Problem List   Diagnosis Code    Essential hypertension I10    Paroxysmal atrial fibrillation (HCC) I48.0    Hyperlipidemia

## 2020-01-30 NOTE — H&P
Chief Complaint   Patient presents with   [facility] Establish Care       due for colon on xarelto            HPI      76 YO WM with multiple significant medical comorbidities including paroxysmal Afib, CKD stage 3, prior NICM (last EF was normal 10/2019), JESSICA, morbid obesity with BMI of 43.96. Referred for colonoscopy, office visit was scheduled due to multiple comorbidities and patient on Xarelto for anticoagulation. He denies prior history of CVA. He denies abdominal pain or GI bleeding. Has regular bowel movements. He has never had a colonoscopy before. Denies family history of colon cancer in first degree family members.     PAST MEDICAL HISTORY      Past Medical History        Past Medical History:   Diagnosis Date    Atrial fibrillation ([facility] [address]      Cardiomyopathy ([facility] [address] 07/2019     EF= 35-40%    Chest pain 07/2019    CHF (congestive heart failure) ([facility])      CKD (chronic kidney disease) stage 3, GFR 30-59 ml/min ([facility])      Family history of early CAD      Hyperlipidemia      Hypertension      JESSICA (obstructive sleep apnea)      S/P ablation of atrial fibrillation 8/11/2015     RFCA         FAMILY HISTORY      Family History   Family History   Problem Relation Age of Onset    High Blood Pressure Mother      Heart Disease Mother      Diabetes Mother      Osteoarthritis Mother      Cancer Father      Heart Disease Sister      Diabetes Brother           SOCIAL HISTORY      Social History               Socioeconomic History    Marital status:         Spouse name: Not on file    Number of children: Not on file    Years of education: 15    Highest education level: Not on file   Occupational History    Occupation:        Employer: [employer]   Social Needs    Financial resource strain: Not on file    Food insecurity:       Worry: Not on file       Inability: Not on file    Transportation needs:       Medical: Not on file       Non-medical: Not on file   Tobacco Use    TWICE A DAY WITH MEALS 180 tablet 3    hydrALAZINE (APRESOLINE) 25 MG tablet TAKE 1 TABLET BY MOUTH  TWICE A  tablet 3    rivaroxaban (XARELTO) 20 MG TABS tablet TAKE 1 TABLET BY MOUTH  EVERY DAY 90 tablet 3    isosorbide mononitrate (IMDUR) 30 MG extended release tablet TAKE 1 TABLET BY MOUTH  EVERY DAY 90 tablet 3    eplerenone (INSPRA) 25 MG tablet TAKE 1 TABLET BY MOUTH  DAILY 90 tablet 3    pravastatin (PRAVACHOL) 40 MG tablet TAKE 1 TABLET BY MOUTH  DAILY 30 tablet 1    furosemide (LASIX) 40 MG tablet TAKE 1 TABLET BY MOUTH  DAILY IN THE MORNING 90 tablet 3    lisinopril (PRINIVIL;ZESTRIL) 20 MG tablet TAKE 1 TABLET BY MOUTH TWO  TIMES DAILY 180 tablet 3         ALLERGIES   No Known Allergies  IMMUNIZATIONS           Immunization History   Administered Date(s) Administered    Influenza Virus Vaccine 09/01/2017    Influenza, MDCK Quadv, IM, PF (Flucelvax 4 yrs and older) 10/19/2018    Pneumococcal Polysaccharide ([id number]) 04/20/2016      REVIEW OF SYSTEMS      Constitutional: denies fever and unexpected weight change. HENT: Negative for ear pain, hearing loss and nosebleeds. Eyes: Negative for pain and visual disturbance. Respiratory: Negative for cough, shortness of breath and wheezing. Cardiovascular: Negative for chest pain, palpitations and leg swelling. Gastrointestinal: see HPI for details. Endocrine: Negative for polydipsia, polyphagia and polyuria. Genitourinary: Negative for difficulty urinating, dysuria, hematuria and urgency. Musculoskeletal: Positive for arthralgias and back pain. Skin: Negative for pallor and rash. Allergic/Immunologic: Negative for environmental allergies and immunocompromised state. Neurological: Negative for seizures, syncope. Hematological: Negative for adenopathy. Does not bruise/bleed easily.    Psychiatric/Behavioral: Negative for agitation, confusion, hallucinations.     PHYSICAL EXAM   Ht 6' 2\" (1.88 m)   Wt (!) 345 lb (156.5 kg)   BMI 44.30 kg/m²       Wt Readings from Last 3 Encounters:   01/22/20 (!) 345 lb (156.5 kg)   01/20/20 (!) 342 lb 6.4 oz (155.3 kg)   12/26/19 (!) 336 lb (152.4 kg)      Constitutional: AAO3, No acute distress  HEENT: no pallor or icterus. Neck: supple, no adenopathy  Cardiovascular: Normal heart rate, Normal rhythm, No murmurs,. RS: Normal breath sounds, No wheezing,   Abdomen: soft, non tender, obese, BS+, cannot assess organomegaly with body habitus  Extremities:  No edema.     FINAL IMPRESSION      Multiple comorbidities including history of cardiomyopathy, paroxysmal Afib on Xarelto, CKD stage 3. Due for colonoscopy. Procedure discussed with patient in detail including risks and benefits. Anesthesia risks, risk of perforation, bleeding discussed with the patient.   Will ideally need to hold the Xarelto for 3 days prior to procedure if ok with Cardiology, will check with his cardiologist.

## 2020-01-30 NOTE — ANESTHESIA POSTPROCEDURE EVALUATION
Department of Anesthesiology  Postprocedure Note    Patient: Dea Connolly  MRN: [id number]  YOB: 1955  Date of evaluation: 1/30/2020  Time:  12:32 PM     Procedure Summary     Date:  01/30/20 Room / Location:  [address] [place] [address] [place] [id number] / [facility]    Anesthesia Start:  4755 Anesthesia Stop:  Christian Dang    Procedures:       COLONOSCOPY W/ ANESTHESIA -SLEEP APNEA- (N/A )      COLONOSCOPY WITH BIOPSY      COLONOSCOPY POLYPECTOMY SNARE/COLD BIOPSY (N/A ) Diagnosis:       Colon cancer screening      (Colon cancer screening [Z12.11])    Surgeon:  William Gregg MD Responsible Provider:  Ramakrishna Maguire MD    Anesthesia Type:  MAC ASA Status:  3          Anesthesia Type: MAC    Lin Phase I: Lin Score: 10    Lin Phase II: Lin Score: 9    Last vitals: Reviewed and per EMR flowsheets.        Anesthesia Post Evaluation    Patient location during evaluation: PACU  Patient participation: complete - patient participated  Level of consciousness: awake and alert  Pain score: 0  Airway patency: patent  Nausea & Vomiting: no nausea and no vomiting  Complications: no  Cardiovascular status: blood pressure returned to baseline  Respiratory status: acceptable  Hydration status: stable

## 2020-02-29 PROBLEM — Z12.11 COLON CANCER SCREENING: Status: RESOLVED | Noted: 2020-01-30 | Resolved: 2020-02-29

## 2020-04-20 ENCOUNTER — OFFICE VISIT (OUTPATIENT)
Dept: CARDIOLOGY CLINIC | Age: 65
End: 2020-04-20
Payer: MEDICARE

## 2020-04-20 VITALS
HEART RATE: 95 BPM | DIASTOLIC BLOOD PRESSURE: 84 MMHG | SYSTOLIC BLOOD PRESSURE: 136 MMHG | WEIGHT: 315 LBS | BODY MASS INDEX: 40.43 KG/M2 | HEIGHT: 74 IN

## 2020-04-20 VITALS
BODY MASS INDEX: 40.43 KG/M2 | WEIGHT: 315 LBS | HEART RATE: 102 BPM | OXYGEN SATURATION: 95 % | DIASTOLIC BLOOD PRESSURE: 84 MMHG | HEIGHT: 74 IN | SYSTOLIC BLOOD PRESSURE: 136 MMHG

## 2020-04-20 PROCEDURE — 93000 ELECTROCARDIOGRAM COMPLETE: CPT | Performed by: NURSE PRACTITIONER

## 2020-04-20 PROCEDURE — 99214 OFFICE O/P EST MOD 30 MIN: CPT | Performed by: INTERNAL MEDICINE

## 2020-04-20 PROCEDURE — 99214 OFFICE O/P EST MOD 30 MIN: CPT | Performed by: NURSE PRACTITIONER

## 2020-04-20 RX ORDER — HYDRALAZINE HYDROCHLORIDE 25 MG/1
25 TABLET, FILM COATED ORAL 2 TIMES DAILY
Qty: 180 TABLET | Refills: 3 | Status: SHIPPED | OUTPATIENT
Start: 2020-04-20 | End: 2020-09-15 | Stop reason: SDUPTHER

## 2020-04-20 RX ORDER — METOPROLOL SUCCINATE 50 MG/1
50 TABLET, EXTENDED RELEASE ORAL DAILY
Qty: 90 TABLET | Refills: 3 | Status: SHIPPED | OUTPATIENT
Start: 2020-04-20 | End: 2020-06-02

## 2020-04-20 RX ORDER — AMIODARONE HYDROCHLORIDE 200 MG/1
100 TABLET ORAL DAILY
Qty: 45 TABLET | Refills: 0 | Status: SHIPPED | OUTPATIENT
Start: 2020-04-20 | End: 2020-06-02 | Stop reason: ALTCHOICE

## 2020-04-20 NOTE — PROGRESS NOTES
[facility]     Electrophysiology Note               HISTORY OF PRESENT ILLNESS:   Stephon Beverly is a 77 y.o. male with a history of atrial fibrillation, sinus bradycardia, NICM, HTN, CHF, HLD, JESSICA, and CKD. He reports he had a CV in 2007 for afib. He had a successful CV in 3/2015. EKG in 4/2015 showed afib. In 8/2015 he had RFCA of persistent afib. He had maintained sinus rhythm for over one year and his amiodarone was discontinued in 10/2016. Had recurrent afib in 12/2016 and amiodarone was restarted. He had a CV 1/2017. Amiodarone was decreased due to sinus bradycardia at subsequent visits. Was referred to nephrology in 2/2019 for CKD and amiodarone use. He had maintained sinus rhythm at subsequent visits until 7/2019. He went to the ER complaining of an increased HR and EKG showed AF with a HR of 116. At his visit in 7/2019 he complained of chest pain. LHC 7/2019 showed normal coronaries and NICM. In 8/2019 he had a CV. Echo in 10/2019 showed an EF of 55%. In 11/2019 he had RFCA for PAF and had remained in sinus at subsequent visits. Today he is being seen for atrial fibrillation. EKG shows afib with a HR of 102. States an kassie on his phone reported an irregular rhythm with HR  for 2 weeks. Feels some pressure when HR is fast but otherwise denies chest pain, palpitations, shortness of breath, and dizziness. Past Medical History:   has a past medical history of Atrial fibrillation ([address] Cardiomyopathy ([address] Chest pain, CHF (congestive heart failure) ([address] CKD (chronic kidney disease) stage 3, GFR 30-59 ml/min ([address] Family history of early CAD, Hyperlipidemia, Hypertension, JESSICA (obstructive sleep apnea), and S/P ablation of atrial fibrillation. Past Surgical History:   has a past surgical history that includes back surgery; hernia repair; Knee arthroscopy (Left, 03/20/2012);  Cardioversion (03/23/2015); transesophageal echocardiogram (8/11/2015); ablation of dysrhythmic focus Respiratory: Negative. Cardiovascular: see HPI  Gastrointestinal: Negative. Genitourinary: Negative. Musculoskeletal: + knee pain  Skin: Negative. Neurological: Negative. Hematological: Negative. Psychiatric/Behavioral: Negative     PHYSICAL EXAM:      /84   Pulse 102   Ht 6' 2\" (1.88 m)   Wt (!) 334 lb (151.5 kg)   SpO2 95%   BMI 42.88 kg/m²      Constitutional and General Appearance: alert, cooperative, no distress and appears stated age  HEENT: PERRL, no cervical lymphadenopathy. No masses palpable. Normal oral mucosa  Respiratory:  · Normal excursion and expansion without use of accessory muscles  · Resp Auscultation: Normal breath sounds without dullness or wheezing  Cardiovascular:  · The apical impulse is not displaced  · Heart tones are irregular   · Jugular venous pulsation Normal  · The carotid upstroke is normal in amplitude and contour without delay or bruit  · Peripheral pulses are symmetrical and full  Abdomen:  · No masses or tenderness  · Bowel sounds present  Extremities:  ·  No cyanosis or clubbing  ·  No BLE edema  · Skin: Warm and dry  Neurological:  · Alert and oriented. · Moves all extremities well  · No abnormalities of mood, affect, memory, mentation, or behavior are noted  · Exhibits normal gait, balance, and coordination. DATA:    EKG 4/20/2020: Afib     Limited echo 19/1/6217:  Normal systolic function with an estimated ejection fraction of 55%. Upper limits of normal left ventricle size. There is mild concentric left ventricular hypertrophy. No regional wall motion abnormalities are seen. Normal left ventricular diastolic filling pressure. The left atrium is mildly dilated. The right atrium is moderately dilated. Cath LV gram on 4/2019 showed EF of 40%.     CHARLY 1/10/2017:  Ejection fraction is visually estimated to be 45-50 %.   Trivial mitral and tricuspid regurgitation.   No HARPREET clot   Compared to previous study from 8- ejection

## 2020-04-20 NOTE — LETTER
[place] [address]     Electrophysiology Note               HISTORY OF PRESENT ILLNESS:   Tiffanie Nunez is a 77 y.o. male with a history of atrial fibrillation, sinus bradycardia, NICM, HTN, CHF, HLD, JESSICA, and CKD. He reports he had a CV in 2007 for afib. He had a successful CV in 3/2015. EKG in 4/2015 showed afib. In 8/2015 he had RFCA of persistent afib. He had maintained sinus rhythm for over one year and his amiodarone was discontinued in 10/2016. Had recurrent afib in 12/2016 and amiodarone was restarted. He had a CV 1/2017. Amiodarone was decreased due to sinus bradycardia at subsequent visits. Was referred to nephrology in 2/2019 for CKD and amiodarone use. He had maintained sinus rhythm at subsequent visits until 7/2019. He went to the ER complaining of an increased HR and EKG showed AF with a HR of 116. At his visit in 7/2019 he complained of chest pain. LHC 7/2019 showed normal coronaries and NICM. In 8/2019 he had a CV. Echo in 10/2019 showed an EF of 55%. In 11/2019 he had RFCA for PAF and had remained in sinus at subsequent visits. Today he is being seen for atrial fibrillation. EKG shows afib with a HR of 102. States an kassie on his phone reported an irregular rhythm with HR  for 2 weeks. Feels some pressure when HR is fast but otherwise denies chest pain, palpitations, shortness of breath, and dizziness. Past Medical History:   has a past medical history of Atrial fibrillation ([address] Cardiomyopathy ([address] Chest pain, CHF (congestive heart failure) ([address] CKD (chronic kidney disease) stage 3, GFR 30-59 ml/min ([address] Family history of early CAD, Hyperlipidemia, Hypertension, JESSICA (obstructive sleep apnea), and S/P ablation of atrial fibrillation. Past Surgical History:   has a past surgical history that includes back surgery; hernia repair; Knee arthroscopy (Left, 03/20/2012);  Cardioversion (03/23/2015); transesophageal echocardiogram (8/11/2015); ablation of dysrhythmic focus (08/17/2015); Cardioversion (01/10/2017); transesophageal echocardiogram (01/10/2017); Cardiac catheterization (07/25/2019); ablation of dysrhythmic focus (11/25/2019); Colonoscopy (N/A, 1/30/2020); Colonoscopy (1/30/2020); and Colonoscopy (N/A, 1/30/2020). Home Medications:    Prior to Admission medications    Medication Sig Start Date End Date Taking? Authorizing Provider   amiodarone (CORDARONE) 200 MG tablet TAKE ONE-HALF TABLET BY  MOUTH DAILY 12/12/19  Yes WESTON Rivas CNP   carvedilol (COREG) 6.25 MG tablet TAKE 1 TABLET BY MOUTH  TWICE A DAY WITH MEALS 11/26/19  Yes WESTON Rivas CNP   hydrALAZINE (APRESOLINE) 25 MG tablet TAKE 1 TABLET BY MOUTH  TWICE A DAY 9/18/19  Yes WESTON Rivas CNP   rivaroxaban (XARELTO) 20 MG TABS tablet TAKE 1 TABLET BY MOUTH  EVERY DAY 9/10/19  Yes WESTON Rivas CNP   isosorbide mononitrate (IMDUR) 30 MG extended release tablet TAKE 1 TABLET BY MOUTH  EVERY DAY 9/10/19  Yes WESTON Rivas CNP   eplerenone (INSPRA) 25 MG tablet TAKE 1 TABLET BY MOUTH  DAILY 9/9/19  Yes WESTON Rivas CNP   pravastatin (PRAVACHOL) 40 MG tablet TAKE 1 TABLET BY MOUTH  DAILY 7/9/19  Yes Karen Atkins MD   furosemide (LASIX) 40 MG tablet TAKE 1 TABLET BY MOUTH  DAILY IN THE MORNING 7/8/19  Yes WESTON Rivas CNP   lisinopril (PRINIVIL;ZESTRIL) 20 MG tablet TAKE 1 TABLET BY MOUTH TWO  TIMES DAILY 7/8/19  Yes WESTON Rivas CNP       Allergies:  Patient has no known allergies. Social History:   reports that he has never smoked. He has never used smokeless tobacco. He reports that he does not drink alcohol or use drugs. Family History: family history includes Cancer in his father; Diabetes in his brother and mother; Heart Disease in his mother and sister; High Blood Pressure in his mother; Osteoarthritis in his mother. Review of Systems   Constitutional: Negative. HENT: Negative.

## 2020-04-20 NOTE — PATIENT INSTRUCTIONS
Plan:  1. Continue current medications   2. Labs- fasting lipids   3. The patient has been in otherwise good general health in the past.  4. Consider stopping amiodarone if he remains in a fib    5.  Follow up in November

## 2020-04-20 NOTE — LETTER
Jordana Garcia MD at [address] [place] [address]      umbilical    KNEE ARTHROSCOPY Left 03/20/2012    Left    TRANSESOPHAGEAL ECHOCARDIOGRAM  8/11/2015    TRANSESOPHAGEAL ECHOCARDIOGRAM  01/10/2017       Objective:   /84   Pulse 95   Ht 6' 2\" (1.88 m)   Wt (!) 334 lb (151.5 kg)   BMI 42.88 kg/m²      Wt Readings from Last 3 Encounters:   04/20/20 (!) 334 lb (151.5 kg)   04/20/20 (!) 334 lb (151.5 kg)   01/24/20 (!) 344 lb (156 kg)       Physical Exam:  General: No Respiratory distress, appears well developed and well nourished. Eyes:  Sclera nonicteric  Nose/Sinuses:  negative findings: nose shows no deformity, asymmetry, or inflammation, nasal mucosa normal, septum midline with no perforation or bleeding  Back:  no pain to palpation  Joint:  no active joint inflammation  Musculoskeletal:  negative  Skin:  Warm and dry  Neck:  Negative for JVD and Carotid Bruits. Chest:  Clear to auscultation, respiration easy  Cardiovascular: irreg irreg 84/min   S2 normal, no murmur, no rub or thrill.   Abdomen:  Obese, Soft normal liver and spleen  Extremities:   Trace BLE edema, no clubbing, no cyanosis,  Pulses: Femoral and pedal pulses are normal.  Neuro: intact    Medications:   Outpatient Encounter Medications as of 4/20/2020   Medication Sig Dispense Refill    metoprolol succinate (TOPROL XL) 50 MG extended release tablet Take 1 tablet by mouth daily 90 tablet 3    amiodarone (CORDARONE) 200 MG tablet Take 0.5 tablets by mouth daily 45 tablet 0    hydrALAZINE (APRESOLINE) 25 MG tablet Take 1 tablet by mouth 2 times daily 180 tablet 3    rivaroxaban (XARELTO) 20 MG TABS tablet TAKE 1 TABLET BY MOUTH  EVERY DAY 90 tablet 3    isosorbide mononitrate (IMDUR) 30 MG extended release tablet TAKE 1 TABLET BY MOUTH  EVERY DAY 90 tablet 3    eplerenone (INSPRA) 25 MG tablet TAKE 1 TABLET BY MOUTH  DAILY 90 tablet 3    pravastatin (PRAVACHOL) 40 MG tablet TAKE 1 TABLET BY MOUTH  DAILY 30 tablet 1 5. Intracardiac ICE catheter placement. 6. Radiofrequency catheter ablation of persistent atrial fibrillation. 7. Direct-current cardioversion x1. Limited echo 57/7/9166:  Normal systolic function with an estimated ejection fraction of 55%. Upper limits of normal left ventricle size. There is mild concentric left ventricular hypertrophy. No regional wall motion abnormalities are seen. Normal left ventricular diastolic filling pressure. The left atrium is mildly dilated. The right atrium is moderately dilated. Cath LV gram on 4/2019 showed EF of 40%. [facility] 7/25/2019 Connie Rodrigues):  Procedures Performed:   1. Left heart catheterization  2. Selective left and right coronary angiogram  3. Left ventriculography     Procedure Findings:  1. Normal coronary angiogram  2. Reduced left ventricular function with EF estimated at 35-40%  3. Normal left heart hemodynamics       CHARLY 1/10/2017:  Ejection fraction is visually estimated to be 45-50 %.   Trivial mitral and tricuspid regurgitation.   No HARPREET clot   Compared to previous study from 8- ejection fraction remains unchanged. NM 03/05/15:  Findings: Decreased activity seen at the anteroseptal region at both stress and rest, though this decrease is more noticeable at stress than rest. A small zone reversibility is also detected in this region by the computer model. No large areas of reversibility are seen. Left ventricular ejection fraction is 43%. End diastolic volume is 196 mL. End systolic volume is 60 mL. Septal hypokinesis. Summary   Atrial fibrillation at baseline. Normal response to Lexiscan. Echo: 02/24/15   Summary   Technically difficult study due to body habitus. Patient is in atrial   fibrillations with controlled ventricular response during this study. Overall left ventricular function is normal.   Ejection fraction is visually estimated to be 55 %.    Left ventricle size is normal.

## 2020-04-20 NOTE — PROGRESS NOTES
[address] Office Note  4/20/2020     Subjective:  Baylee Del Valle is a 77 y.o. male who I here for follow up for parox atrial fib, non ischemic cardiomyopathy, CHF, hypertension, and hyperlipidemia. Wife passed away 4 yrs ago. He has retired from [employer] march 6,2020. [place]: Today he states he has been feeling well overall. He retired last month. He lost his wife last year. He has been spending time with his grandchildren. He does not feel like he is getting enough exercise. He has been watching his diet and has lost some weight. He states his leg swelling has decreased. He has sleep apnea and wears his CPAP nightly. Patient currently denies any weight gain, edema, palpitations, chest pain, shortness of breath, dizziness, and syncope. PMH:   Baylee Del Valle  has known history of remote atrial fibrillation in 2008. He was treated with amiodarone and has remained in sinus rhythm. he presented to the hospital on 02/24/15 with experienced profuse diaphoresis. He felt somepressure in his chest, which was substernal.    In the emergency room, the patient was found to be in atrial fibrillation, but the rate is controlled. Echo 02/24/15 EF 55%. Moderate concentric left ventricular hypertrophy is present. NM 03/05/15 showed decreased activity seen at the anteroseptal region at both stress and rest, though this decrease is more noticeable at stress than rest. A small zone reversibility. He underwent successful DCCV on 3/20/15 after 3 days he feels he went back in to AFIB. He could tell by his monitor and the way he felt. He was seen by Dr Danelle Goldberg and underwent successful RFCA on 8/11/15 with Dr Danelle Goldberg. He had recurrent a fib in 2016 and amiodarone was restarted. He underwent another cardioversion in 2017. His amiodarone was decreased due to episodes of sinus bradycardia. He started following with nephrology in 2/2019 due to CKD.  In 7/2019 he went to the ER with fast heart rates, found to be in a fib, rate 116. He underwent a left heart cath in 7/2019 due to chest pain. He had normal coronaries and NICM. He underwent another cardioversion in 8/2019. An echocardiogram from 10/2019 showed an EF of 55%. He had a RFCA for PAF in 11/2019. Review of Systems:  12 point ROS negative in all areas as listed below except as in [place]  Constitutional, EENT, Cardiovascular, pulmonary, GI, , Musculoskeletal, skin, neurological, hematological, endocrine, Psychiatric  Reviewed past medical history, social, and family history. 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 His wife passed away in 2016.    Family hx: Dad no heart dz    Mom + heart dz  Past Medical History:   Diagnosis Date    [facility]     Cardiomyopathy ([facility] 07/2019    EF= 35-40%    Chest pain 07/2019    [facility]est[facility]) [facility]     CKD (chronic kidney disease) stage 3, GFR 30-59 ml/min ([facility])     has only one functioning kidney    Family history of early CAD     Hyperlipidemia     Hypertension     JESSICA (obstructive sleep apnea)     uses CPAP    S/P ablation of atrial fibrillation 8/11/2015,11-25-19    RFCA     Past Surgical History:   Procedure Laterality Date    ABLATION OF DYSRHYTHMIC FOCUS  08/17/2015    CHARLY/ Atrial Fib Ablation    ABLATION OF DYSRHYTHMIC FOCUS  11/25/2019    for Atrial Fib    BACK SURGERY      CARDIAC CATHETERIZATION  07/25/2019    Non- Ischemic Cardiomyopathy    CARDIOVERSION  03/23/2015    Atrial Fib to SR    CARDIOVERSION  01/10/2017    A Fib- SR    COLONOSCOPY N/A 1/30/2020    COLONOSCOPY W/ ANESTHESIA -SLEEP APNEA- performed by Carmen London MD at [address] [place] [address]  1/30/2020    COLONOSCOPY WITH BIOPSY performed by Carmen London MD at [address] [place] [address] N/A 1/30/2020    COLONOSCOPY POLYPECTOMY SNARE/COLD BIOPSY performed by Carmen London MD at [address] [place] [address] umbilical    KNEE ARTHROSCOPY Left 03/20/2012    Left    TRANSESOPHAGEAL ECHOCARDIOGRAM  8/11/2015    TRANSESOPHAGEAL ECHOCARDIOGRAM  01/10/2017       Objective:   /84   Pulse 95   Ht 6' 2\" (1.88 m)   Wt (!) 334 lb (151.5 kg)   BMI 42.88 kg/m²     Wt Readings from Last 3 Encounters:   04/20/20 (!) 334 lb (151.5 kg)   04/20/20 (!) 334 lb (151.5 kg)   01/24/20 (!) 344 lb (156 kg)       Physical Exam:  General: No Respiratory distress, appears well developed and well nourished. Eyes:  Sclera nonicteric  Nose/Sinuses:  negative findings: nose shows no deformity, asymmetry, or inflammation, nasal mucosa normal, septum midline with no perforation or bleeding  Back:  no pain to palpation  Joint:  no active joint inflammation  Musculoskeletal:  negative  Skin:  Warm and dry  Neck:  Negative for JVD and Carotid Bruits. Chest:  Clear to auscultation, respiration easy  Cardiovascular: irreg irreg 84/min   S2 normal, no murmur, no rub or thrill.   Abdomen:  Obese, Soft normal liver and spleen  Extremities:   Trace BLE edema, no clubbing, no cyanosis,  Pulses: Femoral and pedal pulses are normal.  Neuro: intact    Medications:   Outpatient Encounter Medications as of 4/20/2020   Medication Sig Dispense Refill    metoprolol succinate (TOPROL XL) 50 MG extended release tablet Take 1 tablet by mouth daily 90 tablet 3    amiodarone (CORDARONE) 200 MG tablet Take 0.5 tablets by mouth daily 45 tablet 0    hydrALAZINE (APRESOLINE) 25 MG tablet Take 1 tablet by mouth 2 times daily 180 tablet 3    rivaroxaban (XARELTO) 20 MG TABS tablet TAKE 1 TABLET BY MOUTH  EVERY DAY 90 tablet 3    isosorbide mononitrate (IMDUR) 30 MG extended release tablet TAKE 1 TABLET BY MOUTH  EVERY DAY 90 tablet 3    eplerenone (INSPRA) 25 MG tablet TAKE 1 TABLET BY MOUTH  DAILY 90 tablet 3    pravastatin (PRAVACHOL) 40 MG tablet TAKE 1 TABLET BY MOUTH  DAILY 30 tablet 1    furosemide (LASIX) 40 MG tablet TAKE 1 TABLET BY MOUTH  DAILY IN THE MORNING 90 tablet 3    lisinopril (PRINIVIL;ZESTRIL) 20 MG tablet TAKE 1 TABLET BY MOUTH TWO  TIMES DAILY 180 tablet 3    [DISCONTINUED] amiodarone (CORDARONE) 200 MG tablet TAKE ONE-HALF TABLET BY  MOUTH DAILY 45 tablet 1    [DISCONTINUED] carvedilol (COREG) 6.25 MG tablet TAKE 1 TABLET BY MOUTH  TWICE A DAY WITH MEALS 180 tablet 3    [DISCONTINUED] hydrALAZINE (APRESOLINE) 25 MG tablet TAKE 1 TABLET BY MOUTH  TWICE A  tablet 3     No facility-administered encounter medications on file as of 4/20/2020. Lab Data:  CBC: No results for input(s): WBC, HGB, HCT, MCV, PLT in the last 72 hours. BMP: No results for input(s): NA, K, CL, CO2, PHOS, BUN, CREATININE in the last 72 hours. Invalid input(s): CA  LIVER PROFILE: No results for input(s): AST, ALT, LIPASE, BILIDIR, BILITOT, ALKPHOS in the last 72 hours. Invalid input(s): AMYLASE,  ALB  LIPID:   No components found for: CHLPL  Lab Results   Component Value Date    TRIG 131 03/20/2015    TRIG 293 (H) 11/05/2014    TRIG 256 (H) 12/17/2013     Lab Results   Component Value Date    HDL 39 (L) 03/20/2015    HDL 39 (L) 11/05/2014    HDL 39 (L) 12/17/2013     Lab Results   Component Value Date    LDLCALC 68 03/20/2015    LDLCALC 85 11/05/2014    LDLCALC 78 12/17/2013     Lab Results   Component Value Date    LABVLDL 26 03/20/2015    LABVLDL 59 11/05/2014    LABVLDL 51 12/17/2013     PT/INR: No results for input(s): PROTIME, INR in the last 72 hours. A1C:   Lab Results   Component Value Date    LABA1C 5.7 12/03/2019     BNP:  No results for input(s): BNP in the last 72 hours. IMAGING:   PROCEDURE PERFORMED: 8/11/15  1. Complex electrophysiology study with attempted induction of arrhythmia. 2. Left atrial pacing mapping and recording via coronary sinus catheter. 3. Three-dimensional map of the left atrium with the NavX mapping system. 4. Transeptal  x2.   5. Intracardiac ICE catheter placement.    6. Radiofrequency catheter ablation of persistent atrial fibrillation. 7. Direct-current cardioversion x1. Limited echo 57/4/3449:  Normal systolic function with an estimated ejection fraction of 55%. Upper limits of normal left ventricle size. There is mild concentric left ventricular hypertrophy. No regional wall motion abnormalities are seen. Normal left ventricular diastolic filling pressure. The left atrium is mildly dilated. The right atrium is moderately dilated. Cath LV gram on 4/2019 showed EF of 40%. [facility] 7/25/2019 Reyna Donaldson):  Procedures Performed:   1. Left heart catheterization  2. Selective left and right coronary angiogram  3. Left ventriculography     Procedure Findings:  1. Normal coronary angiogram  2. Reduced left ventricular function with EF estimated at 35-40%  3. Normal left heart hemodynamics       CHARLY 1/10/2017:  Ejection fraction is visually estimated to be 45-50 %.   Trivial mitral and tricuspid regurgitation.   No HARPREET clot   Compared to previous study from 8- ejection fraction remains unchanged. NM 03/05/15:  Findings: Decreased activity seen at the anteroseptal region at both stress and rest, though this decrease is more noticeable at stress than rest. A small zone reversibility is also detected in this region by the computer model. No large areas of reversibility are seen. Left ventricular ejection fraction is 43%. End diastolic volume is 143 mL. End systolic volume is 60 mL. Septal hypokinesis. Summary   Atrial fibrillation at baseline. Normal response to Lexiscan. Echo: 02/24/15   Summary   Technically difficult study due to body habitus. Patient is in atrial   fibrillations with controlled ventricular response during this study. Overall left ventricular function is normal.   Ejection fraction is visually estimated to be 55 %. Left ventricle size is normal.   Moderate concentric left ventricular hypertrophy is present. Normal diastolic  function.    Mitral valve is

## 2020-04-23 ENCOUNTER — HOSPITAL ENCOUNTER (OUTPATIENT)
Age: 65
Discharge: HOME | End: 2020-04-23
Payer: MEDICARE

## 2020-04-23 LAB
ALBUMIN SERPL-MCNC: 3.9 G/DL (ref 3.4–5)
ALP BLD-CCNC: 67 U/L (ref 40–129)
ALT SERPL-CCNC: 18 U/L (ref 10–40)
AST SERPL-CCNC: 13 U/L (ref 15–37)
BILIRUB SERPL-MCNC: 0.9 MG/DL (ref 0–1)
BILIRUBIN DIRECT: <0.2 MG/DL (ref 0–0.3)
BILIRUBIN, INDIRECT: ABNORMAL MG/DL (ref 0–1)
CHOLESTEROL, TOTAL: 131 MG/DL (ref 0–199)
HDLC SERPL-MCNC: 39 MG/DL (ref 40–60)
LDL CHOLESTEROL CALCULATED: 63 MG/DL
TOTAL PROTEIN: 6.5 G/DL (ref 6.4–8.2)
TRIGL SERPL-MCNC: 144 MG/DL (ref 0–150)
TSH REFLEX FT4: 1.31 UIU/ML (ref 0.27–4.2)
VLDLC SERPL CALC-MCNC: 29 MG/DL

## 2020-04-23 PROCEDURE — 84443 ASSAY THYROID STIM HORMONE: CPT

## 2020-04-23 PROCEDURE — 80076 HEPATIC FUNCTION PANEL: CPT

## 2020-04-23 PROCEDURE — 80061 LIPID PANEL: CPT

## 2020-04-23 PROCEDURE — 36415 COLL VENOUS BLD VENIPUNCTURE: CPT

## 2020-04-24 ENCOUNTER — TELEPHONE (OUTPATIENT)
Dept: CARDIOLOGY CLINIC | Age: 65
End: 2020-04-24

## 2020-05-21 NOTE — PROGRESS NOTES
most recent echocardiogram from October 2019 showed normal left ventricular ejection fraction.  - Continue ACEI GDMT. - Continue metoprolol succinate for GDMT. - Also on hydralazine and nitrates for GDMT. - Continue furosemide.  - Plan as per above. RECOMMENDATIONS:  1. Stop amiodarone  2. Increase metoprolol succinate to 50mg twice a day  3. Discussed cardioversion to see if normal rhythm makes you feel better. Ablation discussed. 4.  Continue current course  5. Follow up with Je De León CNP in 6 months or sooner prn    QUALITY MEASURES  1. Tobacco Cessation Counseling: NA  2. Retake of BP if >140/90:   NA  3. Documentation to PCP/referring for new patient:  Sent to PCP at close of office visit  4. CAD patient on anti-platelet: NA  5. CAD patient on STATIN therapy:  NA  6. Patient with CHF and aFib on anticoagulation:  Yes     All questions and concerns were addressed to the patient/family. Alternatives to my treatment were discussed. This note was scribed in the presence of Lane Allan MD by Oswaldo Crocker RN. Dr. Lane Allan MD  Electrophysiology  [place] [address]. [address] [place] [address]. [address]. [place] [address]  Phone: [phone]  Fax: [phone]   6/2/2020     NOTE: This report was transcribed using voice recognition software. Every effort was made to ensure accuracy, however, inadvertent computerized transcription errors may be present. The scribe's documentation has been prepared under my direction and personally reviewed by me in its entirety. I confirm that the note above accurately reflects all work, physical examination, the discussion of treatments and procedures, and medical decision making performed by me. Antonio Mae MD personally performed the services described in this documentation as scribed by nurse in my presence, and is both accurate and complete.     Electronically signed by Santiago Mesa MD on 6/10/2020 at 10:41 AM

## 2020-05-22 ENCOUNTER — HOSPITAL ENCOUNTER (OUTPATIENT)
Age: 65
Discharge: HOME | End: 2020-05-22
Payer: MEDICARE

## 2020-05-22 LAB
ALBUMIN SERPL-MCNC: 3.8 G/DL (ref 3.4–5)
ANION GAP SERPL CALCULATED.3IONS-SCNC: 9 MMOL/L (ref 3–16)
BACTERIA: ABNORMAL /HPF
BUN BLDV-MCNC: 31 MG/DL (ref 7–20)
CALCIUM SERPL-MCNC: 9.1 MG/DL (ref 8.3–10.6)
CHLORIDE BLD-SCNC: 104 MMOL/L (ref 99–110)
CO2: 27 MMOL/L (ref 21–32)
CREAT SERPL-MCNC: 1.3 MG/DL (ref 0.8–1.3)
CREATININE URINE: 128.6 MG/DL (ref 39–259)
GFR AFRICAN AMERICAN: >60
GFR NON-AFRICAN AMERICAN: 55
GLUCOSE BLD-MCNC: 93 MG/DL (ref 70–99)
MICROALBUMIN UR-MCNC: <1.2 MG/DL
MICROALBUMIN/CREAT UR-RTO: NORMAL MG/G (ref 0–30)
PHOSPHORUS: 3 MG/DL (ref 2.5–4.9)
POTASSIUM SERPL-SCNC: 3.9 MMOL/L (ref 3.5–5.1)
RBC UA: ABNORMAL /HPF (ref 0–4)
SODIUM BLD-SCNC: 140 MMOL/L (ref 136–145)
URINE TYPE: ABNORMAL
WBC UA: ABNORMAL /HPF (ref 0–5)

## 2020-05-22 PROCEDURE — 80069 RENAL FUNCTION PANEL: CPT

## 2020-05-22 PROCEDURE — 36415 COLL VENOUS BLD VENIPUNCTURE: CPT

## 2020-05-22 PROCEDURE — 82570 ASSAY OF URINE CREATININE: CPT

## 2020-05-22 PROCEDURE — 81015 MICROSCOPIC EXAM OF URINE: CPT

## 2020-05-22 PROCEDURE — 82043 UR ALBUMIN QUANTITATIVE: CPT

## 2020-06-02 ENCOUNTER — OFFICE VISIT (OUTPATIENT)
Dept: CARDIOLOGY CLINIC | Age: 65
End: 2020-06-02
Payer: MEDICARE

## 2020-06-02 VITALS
HEIGHT: 74 IN | WEIGHT: 315 LBS | BODY MASS INDEX: 40.43 KG/M2 | OXYGEN SATURATION: 96 % | HEART RATE: 71 BPM | SYSTOLIC BLOOD PRESSURE: 138 MMHG | DIASTOLIC BLOOD PRESSURE: 70 MMHG

## 2020-06-02 PROCEDURE — 93000 ELECTROCARDIOGRAM COMPLETE: CPT | Performed by: INTERNAL MEDICINE

## 2020-06-02 PROCEDURE — 99214 OFFICE O/P EST MOD 30 MIN: CPT | Performed by: INTERNAL MEDICINE

## 2020-06-02 NOTE — PATIENT INSTRUCTIONS
RECOMMENDATIONS:  1. Stop amiodarone  2. Increase metoprolol succinate to 50mg twice a day  3. Discussed cardioversion to see if normal rhythm makes you feel better. Ablation discussed. 4.  Continue current course  5.   Follow up with Cynthia Eddy CNP in 6 months or sooner prn

## 2020-06-03 RX ORDER — METOPROLOL SUCCINATE 50 MG/1
50 TABLET, EXTENDED RELEASE ORAL 2 TIMES DAILY
Qty: 180 TABLET | Refills: 3 | Status: SHIPPED | OUTPATIENT
Start: 2020-06-03 | End: 2020-09-15 | Stop reason: SDUPTHER

## 2020-06-04 ENCOUNTER — VIRTUAL VISIT (OUTPATIENT)
Dept: FAMILY MEDICINE CLINIC | Age: 65
End: 2020-06-04
Payer: MEDICARE

## 2020-06-04 PROCEDURE — 99214 OFFICE O/P EST MOD 30 MIN: CPT | Performed by: FAMILY MEDICINE

## 2020-06-04 ASSESSMENT — ENCOUNTER SYMPTOMS: BLOOD IN STOOL: 0

## 2020-06-04 ASSESSMENT — PATIENT HEALTH QUESTIONNAIRE - PHQ9
1. LITTLE INTEREST OR PLEASURE IN DOING THINGS: 0
SUM OF ALL RESPONSES TO PHQ QUESTIONS 1-9: 0
SUM OF ALL RESPONSES TO PHQ QUESTIONS 1-9: 0
SUM OF ALL RESPONSES TO PHQ9 QUESTIONS 1 & 2: 0
2. FEELING DOWN, DEPRESSED OR HOPELESS: 0

## 2020-06-22 ENCOUNTER — HOSPITAL ENCOUNTER (OUTPATIENT)
Dept: CT IMAGING | Age: 65
Discharge: HOME | End: 2020-06-22
Payer: MEDICARE

## 2020-06-22 PROCEDURE — 74176 CT ABD & PELVIS W/O CONTRAST: CPT

## 2020-06-26 ENCOUNTER — TELEPHONE (OUTPATIENT)
Dept: PULMONOLOGY | Age: 65
End: 2020-06-26

## 2020-07-06 ENCOUNTER — TELEPHONE (OUTPATIENT)
Dept: PULMONOLOGY | Age: 65
End: 2020-07-06

## 2020-07-06 ENCOUNTER — VIRTUAL VISIT (OUTPATIENT)
Dept: PULMONOLOGY | Age: 65
End: 2020-07-06
Payer: MEDICARE

## 2020-07-06 PROCEDURE — G8427 DOCREV CUR MEDS BY ELIG CLIN: HCPCS | Performed by: NURSE PRACTITIONER

## 2020-07-06 PROCEDURE — 99213 OFFICE O/P EST LOW 20 MIN: CPT | Performed by: NURSE PRACTITIONER

## 2020-07-06 PROCEDURE — 4040F PNEUMOC VAC/ADMIN/RCVD: CPT | Performed by: NURSE PRACTITIONER

## 2020-07-06 PROCEDURE — 3017F COLORECTAL CA SCREEN DOC REV: CPT | Performed by: NURSE PRACTITIONER

## 2020-07-06 PROCEDURE — 1123F ACP DISCUSS/DSCN MKR DOCD: CPT | Performed by: NURSE PRACTITIONER

## 2020-07-06 ASSESSMENT — SLEEP AND FATIGUE QUESTIONNAIRES
HOW LIKELY ARE YOU TO NOD OFF OR FALL ASLEEP WHILE SITTING AND TALKING TO SOMEONE: 0
HOW LIKELY ARE YOU TO NOD OFF OR FALL ASLEEP WHILE LYING DOWN TO REST IN THE AFTERNOON WHEN CIRCUMSTANCES PERMIT: 1
HOW LIKELY ARE YOU TO NOD OFF OR FALL ASLEEP WHILE SITTING AND READING: 1
HOW LIKELY ARE YOU TO NOD OFF OR FALL ASLEEP WHILE WATCHING TV: 1
HOW LIKELY ARE YOU TO NOD OFF OR FALL ASLEEP IN A CAR, WHILE STOPPED FOR A FEW MINUTES IN TRAFFIC: 0
HOW LIKELY ARE YOU TO NOD OFF OR FALL ASLEEP WHEN YOU ARE A PASSENGER IN A CAR FOR AN HOUR WITHOUT A BREAK: 3
ESS TOTAL SCORE: 9
HOW LIKELY ARE YOU TO NOD OFF OR FALL ASLEEP WHILE SITTING INACTIVE IN A PUBLIC PLACE: 0
HOW LIKELY ARE YOU TO NOD OFF OR FALL ASLEEP WHILE SITTING QUIETLY AFTER LUNCH WITHOUT ALCOHOL: 3

## 2020-07-06 NOTE — TELEPHONE ENCOUNTER
Pt had VV with Alejandra Olivas today and [employer] was not able to send CR due to machine being too old. I have instructed the patient to contact [email or web address] and schedule a time to take his machine in for a download. He should do this within the next 2 weeks. We will watch for a download.

## 2020-07-06 NOTE — PROGRESS NOTES
MA Communication: The following orders are received by verbal communication from GABRIELA Jones.     Orders include:  Order faxed to [employer]       Compliance (see phone note)       1 year fu scheduled 7/9/21

## 2020-07-06 NOTE — PROGRESS NOTES
Subjective:      Patient ID: Adrien Beyer is a 77 y.o. male. HPI   Adrien Beyer is a 77 y.o. male for televideo appointment via video and audio doxy. me virtual visit for JESSICA follow up. States he is doing good with CPAP. Patient is using CPAP   6-8 hrs/night. States he recently retired and has been sleeping better. Using humidifier. No snoring on CPAP. The pressure is well tolerated. The mask is comfortable- nasal mask. No mask leak. No significant daytime sleepiness. No nodding off when driving. No dry nose or throat. No fatigue. Bedtime is 11 pm- MN and rise time is 8 am. Sleep onset is few minutes. Wakes up 1-2 times at night total. 1-2 nocturia. It takes few minutes to fall back a sleep. Rare naps during the day. No headache in am. No weight gain. 0-1 caffienated beverages during the day. No alcohol. ESS is 9.     Past Medical History:   Diagnosis Date    [facility]     Cardiomyopathy ([facility] [facility] 07/2019    EF= 35-40%    Chest pain 07/2019    [facility]congest[facility] failure) [facility]     CKD (chronic kidney disease) stage 3, GFR 30-59 ml/min ([facility])     has only one functioning kidney    Family history of early CAD     Hyperlipidemia     Hypertension     JESSICA (obstructive sleep apnea)     uses CPAP    S/P ablation of atrial fibrillation 8/11/2015,11-25-19    RFCA       Past Surgical History:   Procedure Laterality Date    ABLATION OF DYSRHYTHMIC FOCUS  08/17/2015    CHARLY/ Atrial Fib Ablation    ABLATION OF DYSRHYTHMIC FOCUS  11/25/2019    for Atrial Fib    BACK SURGERY      CARDIAC CATHETERIZATION  07/25/2019    Non- Ischemic Cardiomyopathy    CARDIOVERSION  03/23/2015    Atrial Fib to SR    CARDIOVERSION  01/10/2017    A Fib- SR    COLONOSCOPY N/A 1/30/2020    COLONOSCOPY W/ ANESTHESIA -SLEEP APNEA- performed by Kelton Silverman MD at [facility]  1/30/2020    COLONOSCOPY WITH BIOPSY performed by Kelton Silverman MD at [facility] N/A 1/30/2020    COLONOSCOPY POLYPECTOMY SNARE/COLD BIOPSY performed by Shane Ortega MD at [address] [place] [address] [place]      umbilical    KNEE ARTHROSCOPY Left 03/20/2012    Left    TRANSESOPHAGEAL ECHOCARDIOGRAM  8/11/2015    TRANSESOPHAGEAL ECHOCARDIOGRAM  01/10/2017       Current Medications:    Current Outpatient Medications:     metoprolol succinate (TOPROL XL) 50 MG extended release tablet, Take 1 tablet by mouth 2 times daily, Disp: 180 tablet, Rfl: 3    hydrALAZINE (APRESOLINE) 25 MG tablet, Take 1 tablet by mouth 2 times daily, Disp: 180 tablet, Rfl: 3    rivaroxaban (XARELTO) 20 MG TABS tablet, TAKE 1 TABLET BY MOUTH  EVERY DAY, Disp: 90 tablet, Rfl: 3    isosorbide mononitrate (IMDUR) 30 MG extended release tablet, TAKE 1 TABLET BY MOUTH  EVERY DAY, Disp: 90 tablet, Rfl: 3    eplerenone (INSPRA) 25 MG tablet, TAKE 1 TABLET BY MOUTH  DAILY, Disp: 90 tablet, Rfl: 3    pravastatin (PRAVACHOL) 40 MG tablet, TAKE 1 TABLET BY MOUTH  DAILY, Disp: 30 tablet, Rfl: 1    furosemide (LASIX) 40 MG tablet, TAKE 1 TABLET BY MOUTH  DAILY IN THE MORNING, Disp: 90 tablet, Rfl: 3    lisinopril (PRINIVIL;ZESTRIL) 20 MG tablet, TAKE 1 TABLET BY MOUTH TWO  TIMES DAILY, Disp: 180 tablet, Rfl: 3      Review of Systems      Objective:   Physical Exam  There were no vitals taken for this visit.' on RA    Exam:  Gen: No acute distress, does not appear to be in pain. Appears well developed and nourished. HENT: Head is normocephalic and atraumatic. Normal appearing nose. External Ears normal.   Neck: No visualized mass. Trachea is midline   Eyes: EOM intact. No visible discharge. Resp:No visualized signs of difficulty breathing or respiratory distress, speaking in full sentences. Respiratory effort normal.  Neuro: Awake. Alert. Able to follow commands. No facial asymmetry. Psych: Oriented x 3. No anxiety. Normal affect. Data:  Split/night PSG on 5/22/15  controlled on CPAP therapy. + PLMS with leg movement index 22.0. CPAP compliance data:  Compliance download report from 6/3/17 to 7/2/17 showed patient is using machine 6:27 hrs/night with 100 % compliance and AHI 0.3 within this time frame. 30/30days with greater than 4 hours of machine use. 90% pressure 12.9 cm H20 on auto CPAP 12-16 cm H2O    Compliance download report from 6/2/18 to 7/1/18 showed patient is using machine 6:52 hrs/night with 100% compliance and AHI 0.4 within this time frame. 30/30days with greater than 4 hours of machine use. CPAP 13 cm H20    Compliance download report from 6/1/19 to 6/30/19 reviewed today by me and showed patient is using machine 6:40 hrs/night with 100% compliance and AHI 0.8 within this time frame. 30/30days with greater than 4 hours of machine use. CPAP 13 cm H20     7/6/20-unable to obtain CPAP download report (COVID-19/no modem)    Assessment:      · Severe JESSICA. CPAP 13 cm H2O. Nasal mask. Optimal compliance and efficacy on review today. ·  Atrial fibrillation- ablation 2015- NSR since  · HTN   · Obesity      Plan:      - Continue CPAP 13 cm H2O  - Advised to use CPAP 6-8 hrs at night and during naps. - Replacement of mask, tubing, head straps every 3-6 months or sooner if damaged. - Patient instructed to contact [employer] company for any mask, tubing or machine trouble shooting if problems arise.  - Sleep hygiene handout provided and discussed  - Avoid sedatives, alcohol and caffeinated drinks at bed time.  - No driving motorized vehicles or operating heavy machinery while fatigue, drowsy or sleepy.    - Weight loss is also recommended as a long-term intervention.   - Complications of JESSICA if not treated were discussed with patient patient to include systemic hypertension, pulmonary hypertension, cardiovascular morbidities, car accidents and all cause mortality.  -Patient education regarding sleep tips and CPAP cleaning recommendations        Follow up 1 year sooner if needed    Consent

## 2020-07-16 NOTE — TELEPHONE ENCOUNTER
CPAP compliance report from 6/14/2020-7/13/2020 on CPAP 13 cm H2O reviewed. Compliance is good 100%. AHI is good 0.8.

## 2020-08-05 RX ORDER — ISOSORBIDE MONONITRATE 30 MG/1
TABLET, EXTENDED RELEASE ORAL
Qty: 90 TABLET | Refills: 3 | Status: SHIPPED | OUTPATIENT
Start: 2020-08-05 | End: 2020-09-22 | Stop reason: SDUPTHER

## 2020-08-05 RX ORDER — PRAVASTATIN SODIUM 40 MG
TABLET ORAL
Qty: 90 TABLET | Refills: 3 | Status: SHIPPED | OUTPATIENT
Start: 2020-08-05 | End: 2020-09-22 | Stop reason: SDUPTHER

## 2020-08-05 RX ORDER — LISINOPRIL 20 MG/1
TABLET ORAL
Qty: 180 TABLET | Refills: 3 | Status: SHIPPED | OUTPATIENT
Start: 2020-08-05 | End: 2020-09-22 | Stop reason: SDUPTHER

## 2020-09-08 RX ORDER — FUROSEMIDE 40 MG/1
TABLET ORAL
Qty: 90 TABLET | Refills: 0 | Status: SHIPPED | OUTPATIENT
Start: 2020-09-08 | End: 2020-09-15 | Stop reason: SDUPTHER

## 2020-09-08 RX ORDER — EPLERENONE 25 MG/1
TABLET, FILM COATED ORAL
Qty: 90 TABLET | Refills: 0 | Status: SHIPPED | OUTPATIENT
Start: 2020-09-08 | End: 2020-09-22 | Stop reason: SDUPTHER

## 2020-09-08 NOTE — TELEPHONE ENCOUNTER
Pt last OV 4/20/20 RKG, next scheduled for 12/2/20 w/NPBB    Last OV  Assessment:  Lisette Perry was seen today for congestive heart failure, atrial fibrillation and 3 month follow-up.     Diagnoses and associated orders for this visit:  1. Paroxysmal atrial fibrillation               -RFCA 8/2015, DCCV 1/10/17 and then again 8/2019, repeat RFCA 11/2019  2. Sinus bradycardia   3. Hypertension   4. Non ischemic cardiomyopathy               -EF 55% 10/2019  5. Chronic diastolic CHF  6. Chronic kidney disease               followed by Dr. Noman Jaramillo  7. JESSICA              -on CPAP                Plan:  1. Continue current medications   2. Labs- fasting lipids TSH liver function  3. Healthy lifestyle education reviewed including nutrition, exercise and activity  4. Consider stopping amiodarone if he remains in a fib  Will get opinion from EP  5.  Follow up in November 2020

## 2020-09-15 RX ORDER — HYDRALAZINE HYDROCHLORIDE 25 MG/1
25 TABLET, FILM COATED ORAL 2 TIMES DAILY
Qty: 180 TABLET | Refills: 3 | Status: SHIPPED | OUTPATIENT
Start: 2020-09-15 | End: 2021-07-07

## 2020-09-15 RX ORDER — METOPROLOL SUCCINATE 50 MG/1
50 TABLET, EXTENDED RELEASE ORAL 2 TIMES DAILY
Qty: 180 TABLET | Refills: 3 | Status: SHIPPED | OUTPATIENT
Start: 2020-09-15 | End: 2021-08-13

## 2020-09-15 RX ORDER — FUROSEMIDE 40 MG/1
TABLET ORAL
Qty: 90 TABLET | Refills: 0 | Status: SHIPPED | OUTPATIENT
Start: 2020-09-15 | End: 2020-09-22 | Stop reason: SDUPTHER

## 2020-09-22 RX ORDER — ISOSORBIDE MONONITRATE 30 MG/1
TABLET, EXTENDED RELEASE ORAL
Qty: 90 TABLET | Refills: 3 | Status: SHIPPED | OUTPATIENT
Start: 2020-09-22 | End: 2021-10-04

## 2020-09-22 RX ORDER — EPLERENONE 25 MG/1
TABLET, FILM COATED ORAL
Qty: 90 TABLET | Refills: 3 | Status: SHIPPED | OUTPATIENT
Start: 2020-09-22 | End: 2020-10-02

## 2020-09-22 RX ORDER — PRAVASTATIN SODIUM 40 MG
TABLET ORAL
Qty: 90 TABLET | Refills: 3 | Status: SHIPPED | OUTPATIENT
Start: 2020-09-22 | End: 2021-10-01

## 2020-09-22 RX ORDER — LISINOPRIL 20 MG/1
TABLET ORAL
Qty: 180 TABLET | Refills: 3 | Status: SHIPPED | OUTPATIENT
Start: 2020-09-22 | End: 2021-10-01

## 2020-09-22 RX ORDER — FUROSEMIDE 40 MG/1
TABLET ORAL
Qty: 90 TABLET | Refills: 3 | Status: SHIPPED | OUTPATIENT
Start: 2020-09-22 | End: 2021-05-17

## 2020-10-02 RX ORDER — EPLERENONE 25 MG/1
TABLET, FILM COATED ORAL
Qty: 30 TABLET | Refills: 2 | Status: SHIPPED | OUTPATIENT
Start: 2020-10-02 | End: 2021-04-21

## 2020-11-13 NOTE — PROGRESS NOTES
[address] Office Note  11/17/2020     Subjective:  Jiles Sacks is a 77 y.o. male who I here for follow up for persistent atrial fib, non ischemic cardiomyopathy, tachycardia induced diastolic  CHF, hypertension, and hyperlipidemia. Obesity JESSICA CPAP  Wife passed away 4 yrs ago. He has retired from [employer] march 6,2020. [place]: Today he reports that he has felt fluttering in his chest once or twice. He states that it is tolerable. Patient denies chest pain, sob, dizziness or syncope. He is enjoying skilled nursing. He monitors his blood pressure and heart rate at home. He is taking his medications as prescribed. He is tolerating them. He does wear his CPAP nightly. He will be following up with EP in the next couple of weeks. He has been staying busy with yard work. PMH:   Jiles Sacks  has known history of remote atrial fibrillation in 2008. He was treated with amiodarone and has remained in sinus rhythm. he presented to the hospital on 02/24/15 with experienced profuse diaphoresis. He felt somepressure in his chest, which was substernal.    In the emergency room, the patient was found to be in atrial fibrillation, but the rate is controlled. Echo 02/24/15 EF 55%. Moderate concentric left ventricular hypertrophy is present. NM 03/05/15 showed decreased activity seen at the anteroseptal region at both stress and rest, though this decrease is more noticeable at stress than rest. A small zone reversibility. He underwent successful DCCV on 3/20/15 after 3 days he feels he went back in to AFIB. He could tell by his monitor and the way he felt. He was seen by Dr Maine Moran and underwent successful RFCA on 8/11/15 with Dr Maine Moran. He had recurrent a fib in 2016 and amiodarone was restarted. He underwent another cardioversion in 2017. His amiodarone was decreased due to episodes of sinus bradycardia. He started following with nephrology in 2/2019 due to CKD.  In 7/2019 he went to the ER with fast heart rates, found to be in a fib, rate 116. He underwent a left heart cath in 7/2019 due to chest pain. He had normal coronaries and NICM. He underwent another cardioversion in 8/2019. An echocardiogram from 10/2019 showed an EF of 55%. He had a RFCA for PAF in 11/2019. Review of Systems:  12 point ROS negative in all areas as listed below except as in [place]  Constitutional, EENT, pulmonary, GI, , Musculoskeletal, skin, neurological, hematological, endocrine, Psychiatric  Reviewed past medical history, social, and family history. 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 His wife passed away in 2016.    Family hx: Dad no heart dz    Mom + heart dz  Past Medical History:   Diagnosis Date    [facility] fibrillation ([facility] [address]     Cardiomyopathy ([facility] [address] 07/2019    EF= 35-40%    Chest pain 07/2019    CHF (congestive heart failure) (HCC)     CKD (chronic kidney disease) stage 3, GFR 30-59 ml/min     has only one functioning kidney    Family history of early CAD     Hyperlipidemia     Hypertension     JESSICA (obstructive sleep apnea)     uses CPAP    S/P ablation of atrial fibrillation 8/11/2015,11-25-19    RFCA     Past Surgical History:   Procedure Laterality Date    ABLATION OF DYSRHYTHMIC FOCUS  08/17/2015    CHARLY/ Atrial Fib Ablation    ABLATION OF DYSRHYTHMIC FOCUS  11/25/2019    for Atrial Fib    BACK SURGERY      CARDIAC CATHETERIZATION  07/25/2019    Non- Ischemic Cardiomyopathy    CARDIOVERSION  03/23/2015    Atrial Fib to SR    CARDIOVERSION  01/10/2017    A Fib- SR    COLONOSCOPY N/A 1/30/2020    COLONOSCOPY W/ ANESTHESIA -SLEEP APNEA- performed by Yamileth Funez MD at [address]  1/30/2020    COLONOSCOPY WITH BIOPSY performed by Yamileth Funez MD at [address] N/A 1/30/2020    COLONOSCOPY POLYPECTOMY SNARE/COLD BIOPSY performed by Yamileth Funez MD at [address] [place] [address] umbilical    KNEE ARTHROSCOPY Left 03/20/2012    Left    TRANSESOPHAGEAL ECHOCARDIOGRAM  8/11/2015    TRANSESOPHAGEAL ECHOCARDIOGRAM  01/10/2017       Objective:   /82   Pulse 92   Ht 6' 2\" (1.88 m)   Wt (!) 338 lb (153.3 kg)   SpO2 98%   BMI 43.40 kg/m²     Wt Readings from Last 3 Encounters:   11/17/20 (!) 338 lb (153.3 kg)   10/05/20 (!) 332 lb (150.6 kg)   06/02/20 (!) 331 lb (150.1 kg)       Physical Exam:  General: No Respiratory distress, appears well developed and well nourished. Eyes:  Sclera nonicteric  Nose/Sinuses:  negative findings: nose shows no deformity, asymmetry, or inflammation, nasal mucosa normal, septum midline with no perforation or bleeding  Back:  no pain to palpation  Joint:  no active joint inflammation  Musculoskeletal:  negative  Skin:  Warm and dry  Neck:  Negative for JVD and Carotid Bruits. Chest:  Clear to auscultation, respiration easy  Cardiovascular: irreg irreg 80/min   S2 normal, no murmur, no rub or thrill.   Abdomen:  Obese, Soft normal liver and spleen  Extremities:   No edema, no clubbing, no cyanosis,  Pulses: pedal pulses are normal.  Neuro: intact    Medications:   Outpatient Encounter Medications as of 11/17/2020   Medication Sig Dispense Refill    eplerenone (INSPRA) 25 MG tablet TAKE 1 TABLET BY MOUTH EVERY DAY 30 tablet 2    rivaroxaban (XARELTO) 20 MG TABS tablet TAKE 1 TABLET BY MOUTH  EVERY DAY 90 tablet 3    furosemide (LASIX) 40 MG tablet TAKE 1 TABLET BY MOUTH  DAILY IN THE MORNING 90 tablet 3    pravastatin (PRAVACHOL) 40 MG tablet TAKE 1 TABLET BY MOUTH  DAILY 90 tablet 3    isosorbide mononitrate (IMDUR) 30 MG extended release tablet TAKE 1 TABLET BY MOUTH  EVERY DAY 90 tablet 3    lisinopril (PRINIVIL;ZESTRIL) 20 MG tablet TAKE 1 TABLET BY MOUTH TWO  TIMES DAILY 180 tablet 3    metoprolol succinate (TOPROL XL) 50 MG extended release tablet Take 1 tablet by mouth 2 times daily 180 tablet 3    hydrALAZINE (APRESOLINE) 25 MG tablet Take 1 tablet by mouth 2 times daily 180 tablet 3     No facility-administered encounter medications on file as of 11/17/2020. Lab Data:  CBC: No results for input(s): WBC, HGB, HCT, MCV, PLT in the last 72 hours. BMP: No results for input(s): NA, K, CL, CO2, PHOS, BUN, CREATININE in the last 72 hours. Invalid input(s): CA  LIVER PROFILE: No results for input(s): AST, ALT, LIPASE, BILIDIR, BILITOT, ALKPHOS in the last 72 hours. Invalid input(s): AMYLASE,  ALB  LIPID:   No components found for: CHLPL  Lab Results   Component Value Date    TRIG 144 04/23/2020    TRIG 131 03/20/2015    TRIG 293 (H) 11/05/2014     Lab Results   Component Value Date    HDL 39 (L) 04/23/2020    HDL 39 (L) 03/20/2015    HDL 39 (L) 11/05/2014     Lab Results   Component Value Date    LDLCALC 63 04/23/2020    LDLCALC 68 03/20/2015    LDLCALC 85 11/05/2014     Lab Results   Component Value Date    LABVLDL 29 04/23/2020    LABVLDL 26 03/20/2015    LABVLDL 59 11/05/2014     PT/INR: No results for input(s): PROTIME, INR in the last 72 hours. A1C:   Lab Results   Component Value Date    LABA1C 5.7 12/03/2019     BNP:  No results for input(s): BNP in the last 72 hours. IMAGING:   EKG 6/2/2020  Atrial fibrillation   -  Nonspecific T-abnormality. 92 bpm    Limited echo 53/4/3542:  Normal systolic function with an estimated ejection fraction of 55%. Upper limits of normal left ventricle size. There is mild concentric left ventricular hypertrophy. No regional wall motion abnormalities are seen. Normal left ventricular diastolic filling pressure. The left atrium is mildly dilated. The right atrium is moderately dilated. Cath LV gram on 4/2019 showed EF of 40%. [facility] 7/25/2019 Katia Babcock):  Procedures Performed:   1. Left heart catheterization  2. Selective left and right coronary angiogram  3. Left ventriculography     Procedure Findings:  1.  Normal coronary angiogram  2. Reduced left ventricular function with EF estimated at 35-40%  3. Normal left heart hemodynamics       CHARLY 1/10/2017:  Ejection fraction is visually estimated to be 45-50 %.   Trivial mitral and tricuspid regurgitation.   No HARPREET clot   Compared to previous study from 8- ejection fraction remains unchanged. PROCEDURE PERFORMED: 8/11/15  1. Complex electrophysiology study with attempted induction of arrhythmia. 2. Left atrial pacing mapping and recording via coronary sinus catheter. 3. Three-dimensional map of the left atrium with the NavX mapping system. 4. Transeptal  x2.   5. Intracardiac ICE catheter placement. 6. Radiofrequency catheter ablation of persistent atrial fibrillation. 7. Direct-current cardioversion x1. NM 03/05/15:  Findings: Decreased activity seen at the anteroseptal region at both stress and rest, though this decrease is more noticeable at stress than rest. A small zone reversibility is also detected in this region by the computer model. No large areas of reversibility are seen. Left ventricular ejection fraction is 43%. End diastolic volume is 322 mL. End systolic volume is 60 mL. Septal hypokinesis. Summary   Atrial fibrillation at baseline. Normal response to Lexiscan. Echo: 02/24/15   Summary   Technically difficult study due to body habitus. Patient is in atrial   fibrillations with controlled ventricular response during this study. Overall left ventricular function is normal.   Ejection fraction is visually estimated to be 55 %. Left ventricle size is normal.   Moderate concentric left ventricular hypertrophy is present. Normal diastolic  function. Mitral valve is structurally normal.   Trivial mitral regurgitation is present. The left atrium is normal in size. The aortic valve is normal in structure and function. There is no   significant aortic regurgitation. The aortic root is enlarged ( 3.8cm) in size.    IVC size is normal (<2.1 cm) and collapses > 50% with respiration   consistent with normal RA pressure (3 mmHg). No obvious masses, thrombi, or vegetations are noted. Assessment:  Encounter Diagnoses   Name Primary?  Chronic diastolic congestive heart failure (HCC)     Essential hypertension     Atrial flutter, unspecified type ([facility] [address]     Nonischemic cardiomyopathy (HCC)     Persistent atrial fibrillation (HCC) Yes       Diagnoses and associated orders for this visit:  1. Paroxysmal atrial fibrillation    -RFCA 8/2015, DCCV 1/10/17 and then again 8/2019, repeat RFCA 11/2019  2. Sinus bradycardia   3. Hypertension   4. Non ischemic cardiomyopathy    -EF 55% 10/2019  5. Chronic diastolic CHF  6. Chronic kidney disease    followed by Dr. Jennifer Bal  7. JESSICA   -on CPAP     Plan:  1. Next April get CBC, TSH, FLP, and CMP-fasting  2. Medications reviewed. No refills warranted at this time. 3. Follow up in a year    QUALITY MEASURES  1. Tobacco Cessation Counseling: NA  2. Retake of BP if >140/90:   NA  3. Documentation to PCP/referring for new patient:  Sent to PCP at close of office visit  4. CAD patient on anti-platelet NA anticoagulated  5. CAD patient on STATIN therapy:  Yes  6. Patient with CHF and aFib on anticoagulation:  Yes Xarelto     This note was scribed in the presence of Pablo Hector MD by Colton Croft RN. I, Dr. Pablo Hector, personally performed the services described in this documentation, as scribed by the above signed scribe in my presence. It is both accurate and complete to my knowledge. I agree with the details independently gathered by the clinical support staff, while the remaining scribed note accurately describes my personal service to the patient.       Violeta Vaca MD  [facility] ([place]  [phone] [facility] office  [phone] [facility]

## 2020-11-17 ENCOUNTER — OFFICE VISIT (OUTPATIENT)
Dept: CARDIOLOGY CLINIC | Age: 65
End: 2020-11-17
Payer: MEDICARE

## 2020-11-17 VITALS
OXYGEN SATURATION: 98 % | WEIGHT: 315 LBS | SYSTOLIC BLOOD PRESSURE: 128 MMHG | BODY MASS INDEX: 40.43 KG/M2 | DIASTOLIC BLOOD PRESSURE: 82 MMHG | HEART RATE: 92 BPM | HEIGHT: 74 IN

## 2020-11-17 PROCEDURE — G8417 CALC BMI ABV UP PARAM F/U: HCPCS | Performed by: INTERNAL MEDICINE

## 2020-11-17 PROCEDURE — G8427 DOCREV CUR MEDS BY ELIG CLIN: HCPCS | Performed by: INTERNAL MEDICINE

## 2020-11-17 PROCEDURE — 1123F ACP DISCUSS/DSCN MKR DOCD: CPT | Performed by: INTERNAL MEDICINE

## 2020-11-17 PROCEDURE — 1036F TOBACCO NON-USER: CPT | Performed by: INTERNAL MEDICINE

## 2020-11-17 PROCEDURE — 99214 OFFICE O/P EST MOD 30 MIN: CPT | Performed by: INTERNAL MEDICINE

## 2020-11-17 PROCEDURE — 4040F PNEUMOC VAC/ADMIN/RCVD: CPT | Performed by: INTERNAL MEDICINE

## 2020-11-17 PROCEDURE — 3017F COLORECTAL CA SCREEN DOC REV: CPT | Performed by: INTERNAL MEDICINE

## 2020-11-17 PROCEDURE — G8484 FLU IMMUNIZE NO ADMIN: HCPCS | Performed by: INTERNAL MEDICINE

## 2020-11-17 NOTE — LETTER
[facility] Office Note  11/17/2020     Subjective:  Armida Eagle is a 77 y.o. male who I here for follow up for persistent atrial fib, non ischemic cardiomyopathy, tachycardia induced diastolic  CHF, hypertension, and hyperlipidemia. Obesity JESSICA CPAP  Wife passed away 4 yrs ago. He has retired from [employer] march 6,2020. [place]: Today he reports that he has felt fluttering in his chest once or twice. He states that it is tolerable. Patient denies chest pain, sob, dizziness or syncope. He is enjoying detention. He monitors his blood pressure and heart rate at home. He is taking his medications as prescribed. He is tolerating them. He does wear his CPAP nightly. He will be following up with EP in the next couple of weeks. He has been staying busy with yard work. PMH:   Armida Eagle  has known history of remote atrial fibrillation in 2008. He was treated with amiodarone and has remained in sinus rhythm. he presented to the hospital on 02/24/15 with experienced profuse diaphoresis. He felt somepressure in his chest, which was substernal.    In the emergency room, the patient was found to be in atrial fibrillation, but the rate is controlled. Echo 02/24/15 EF 55%. Moderate concentric left ventricular hypertrophy is present. NM 03/05/15 showed decreased activity seen at the anteroseptal region at both stress and rest, though this decrease is more noticeable at stress than rest. A small zone reversibility. He underwent successful DCCV on 3/20/15 after 3 days he feels he went back in to AFIB. He could tell by his monitor and the way he felt. He was seen by Dr Annabel Uribe and underwent successful RFCA on 8/11/15 with Dr Annabel Uribe. He had recurrent a fib in 2016 and amiodarone was restarted. He underwent another cardioversion in 2017. His amiodarone was decreased due to episodes of sinus bradycardia. He started following with nephrology in 2/2019 due to CKD.  In 7/2019 he went to the ER with fast heart rates, found to be in a fib, rate 116. He underwent a left heart cath in 7/2019 due to chest pain. He had normal coronaries and NICM. He underwent another cardioversion in 8/2019. An echocardiogram from 10/2019 showed an EF of 55%. He had a RFCA for PAF in 11/2019. Review of Systems:  12 point ROS negative in all areas as listed below except as in [place]  Constitutional, EENT, pulmonary, GI, , Musculoskeletal, skin, neurological, hematological, endocrine, Psychiatric  Reviewed past medical history, social, and family history. 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 His wife passed away in 2016.    Family hx: Dad no heart dz    Mom + heart dz  Past Medical History:   Diagnosis Date    [facility]     Cardiomyopathy ([facility] [address] 07/2019    EF= 35-40%    Chest pain 07/2019    CHF (congestive heart failure) (HCC)     CKD (chronic kidney disease) stage 3, GFR 30-59 ml/min     has only one functioning kidney    Family history of early CAD     Hyperlipidemia     Hypertension     JESSICA (obstructive sleep apnea)     uses CPAP    S/P ablation of atrial fibrillation 8/11/2015,11-25-19    RFCA     Past Surgical History:   Procedure Laterality Date    ABLATION OF DYSRHYTHMIC FOCUS  08/17/2015    CHARLY/ Atrial Fib Ablation    ABLATION OF DYSRHYTHMIC FOCUS  11/25/2019    for Atrial Fib    BACK SURGERY      CARDIAC CATHETERIZATION  07/25/2019    Non- Ischemic Cardiomyopathy    CARDIOVERSION  03/23/2015    Atrial Fib to SR    CARDIOVERSION  01/10/2017    A Fib- SR    COLONOSCOPY N/A 1/30/2020    COLONOSCOPY W/ ANESTHESIA -SLEEP APNEA- performed by Maricarmen Pardo MD at [place] [address]  1/30/2020    COLONOSCOPY WITH BIOPSY performed by Maricarmen Pardo MD at [place] [address] N/A 1/30/2020    COLONOSCOPY POLYPECTOMY SNARE/COLD BIOPSY performed by Maricarmen Pardo MD at [address]  HERNIA REPAIR      umbilical    KNEE ARTHROSCOPY Left 03/20/2012    Left    TRANSESOPHAGEAL ECHOCARDIOGRAM  8/11/2015    TRANSESOPHAGEAL ECHOCARDIOGRAM  01/10/2017       Objective:   /82   Pulse 92   Ht 6' 2\" (1.88 m)   Wt (!) 338 lb (153.3 kg)   SpO2 98%   BMI 43.40 kg/m²     Wt Readings from Last 3 Encounters:   11/17/20 (!) 338 lb (153.3 kg)   10/05/20 (!) 332 lb (150.6 kg)   06/02/20 (!) 331 lb (150.1 kg)       Physical Exam:  General: No Respiratory distress, appears well developed and well nourished. Eyes:  Sclera nonicteric  Nose/Sinuses:  negative findings: nose shows no deformity, asymmetry, or inflammation, nasal mucosa normal, septum midline with no perforation or bleeding  Back:  no pain to palpation  Joint:  no active joint inflammation  Musculoskeletal:  negative  Skin:  Warm and dry  Neck:  Negative for JVD and Carotid Bruits. Chest:  Clear to auscultation, respiration easy  Cardiovascular: irreg irreg 80/min   S2 normal, no murmur, no rub or thrill.   Abdomen:  Obese, Soft normal liver and spleen  Extremities:   No edema, no clubbing, no cyanosis,  Pulses: pedal pulses are normal.  Neuro: intact    Medications:   Outpatient Encounter Medications as of 11/17/2020   Medication Sig Dispense Refill    eplerenone (INSPRA) 25 MG tablet TAKE 1 TABLET BY MOUTH EVERY DAY 30 tablet 2    rivaroxaban (XARELTO) 20 MG TABS tablet TAKE 1 TABLET BY MOUTH  EVERY DAY 90 tablet 3    furosemide (LASIX) 40 MG tablet TAKE 1 TABLET BY MOUTH  DAILY IN THE MORNING 90 tablet 3    pravastatin (PRAVACHOL) 40 MG tablet TAKE 1 TABLET BY MOUTH  DAILY 90 tablet 3    isosorbide mononitrate (IMDUR) 30 MG extended release tablet TAKE 1 TABLET BY MOUTH  EVERY DAY 90 tablet 3    lisinopril (PRINIVIL;ZESTRIL) 20 MG tablet TAKE 1 TABLET BY MOUTH TWO  TIMES DAILY 180 tablet 3    metoprolol succinate (TOPROL XL) 50 MG extended release tablet Take 1 tablet by mouth 2 times daily 180 tablet 3  hydrALAZINE (APRESOLINE) 25 MG tablet Take 1 tablet by mouth 2 times daily 180 tablet 3     No facility-administered encounter medications on file as of 11/17/2020. Lab Data:  CBC: No results for input(s): WBC, HGB, HCT, MCV, PLT in the last 72 hours. BMP: No results for input(s): NA, K, CL, CO2, PHOS, BUN, CREATININE in the last 72 hours. Invalid input(s): CA  LIVER PROFILE: No results for input(s): AST, ALT, LIPASE, BILIDIR, BILITOT, ALKPHOS in the last 72 hours. Invalid input(s): AMYLASE,  ALB  LIPID:   No components found for: CHLPL  Lab Results   Component Value Date    TRIG 144 04/23/2020    TRIG 131 03/20/2015    TRIG 293 (H) 11/05/2014     Lab Results   Component Value Date    HDL 39 (L) 04/23/2020    HDL 39 (L) 03/20/2015    HDL 39 (L) 11/05/2014     Lab Results   Component Value Date    LDLCALC 63 04/23/2020    LDLCALC 68 03/20/2015    LDLCALC 85 11/05/2014     Lab Results   Component Value Date    LABVLDL 29 04/23/2020    LABVLDL 26 03/20/2015    LABVLDL 59 11/05/2014     PT/INR: No results for input(s): PROTIME, INR in the last 72 hours. A1C:   Lab Results   Component Value Date    LABA1C 5.7 12/03/2019     BNP:  No results for input(s): BNP in the last 72 hours. IMAGING:   EKG 6/2/2020  Atrial fibrillation   -  Nonspecific T-abnormality. 92 bpm    Limited echo 80/7/0935:  Normal systolic function with an estimated ejection fraction of 55%. Upper limits of normal left ventricle size. There is mild concentric left ventricular hypertrophy. No regional wall motion abnormalities are seen. Normal left ventricular diastolic filling pressure. The left atrium is mildly dilated. The right atrium is moderately dilated. Cath LV gram on 4/2019 showed EF of 40%. [facility]  Procedures Performed:   1. Left heart catheterization  2. Selective left and right coronary angiogram  3. Left ventriculography     Procedure Findings:  1.  Normal coronary angiogram 2. Reduced left ventricular function with EF estimated at 35-40%  3. Normal left heart hemodynamics       CHARLY 1/10/2017:  Ejection fraction is visually estimated to be 45-50 %.   Trivial mitral and tricuspid regurgitation.   No HARPREET clot   Compared to previous study from 8- ejection fraction remains unchanged. PROCEDURE PERFORMED: 8/11/15  1. Complex electrophysiology study with attempted induction of arrhythmia. 2. Left atrial pacing mapping and recording via coronary sinus catheter. 3. Three-dimensional map of the left atrium with the NavX mapping system. 4. Transeptal  x2.   5. Intracardiac ICE catheter placement. 6. Radiofrequency catheter ablation of persistent atrial fibrillation. 7. Direct-current cardioversion x1. NM 03/05/15:  Findings: Decreased activity seen at the anteroseptal region at both stress and rest, though this decrease is more noticeable at stress than rest. A small zone reversibility is also detected in this region by the computer model. No large areas of reversibility are seen. Left ventricular ejection fraction is 43%. End diastolic volume is 379 mL. End systolic volume is 60 mL. Septal hypokinesis. Summary   Atrial fibrillation at baseline. Normal response to Lexiscan. Echo: 02/24/15   Summary   Technically difficult study due to body habitus. Patient is in atrial   fibrillations with controlled ventricular response during this study. Overall left ventricular function is normal.   Ejection fraction is visually estimated to be 55 %. Left ventricle size is normal.   Moderate concentric left ventricular hypertrophy is present. Normal diastolic  function. Mitral valve is structurally normal.   Trivial mitral regurgitation is present. The left atrium is normal in size. The aortic valve is normal in structure and function. There is no   significant aortic regurgitation. The aortic root is enlarged ( 3.8cm) in size. IVC size is normal (<2.1 cm) and collapses > 50% with respiration   consistent with normal RA pressure (3 mmHg). No obvious masses, thrombi, or vegetations are noted. Assessment:  Encounter Diagnoses   Name Primary?  Chronic diastolic congestive heart failure (HCC)     Essential hypertension     Atrial flutter, unspecified type ([facility] [address]     Nonischemic cardiomyopathy (HCC)     Persistent atrial fibrillation (HCC) Yes       Diagnoses and associated orders for this visit:  1. Paroxysmal atrial fibrillation    -RFCA 8/2015, DCCV 1/10/17 and then again 8/2019, repeat RFCA 11/2019  2. Sinus bradycardia   3. Hypertension   4. Non ischemic cardiomyopathy    -EF 55% 10/2019  5. Chronic diastolic CHF  6. Chronic kidney disease    followed by Dr. Brayden Nassar  7. JESSICA   -on CPAP     Plan:  1. Next April get CBC, TSH, FLP, and CMP-fasting  2. Medications reviewed. No refills warranted at this time. 3. Follow up in a year    QUALITY MEASURES  1. Tobacco Cessation Counseling: NA  2. Retake of BP if >140/90:   NA  3. Documentation to PCP/referring for new patient:  Sent to PCP at close of office visit  4. CAD patient on anti-platelet NA anticoagulated  5. CAD patient on STATIN therapy:  Yes  6. Patient with CHF and aFib on anticoagulation:  Yes Xarelto     This note was scribed in the presence of James Lawrence MD by Pablo Do RN. I, Dr. James Lawrence, personally performed the services described in this documentation, as scribed by the above signed scribe in my presence. It is both accurate and complete to my knowledge. I agree with the details independently gathered by the clinical support staff, while the remaining scribed note accurately describes my personal service to the patient.       Azul Shepherd MD  [facility]  [phone] [facility] office  [phone] [facility]

## 2020-12-01 NOTE — PROGRESS NOTES
[place] [address]     Electrophysiology Note               HISTORY OF PRESENT ILLNESS:   Mary Holly is a 77 y.o. male with a history of atrial fibrillation, sinus bradycardia, NICM, HTN, CHF, HLD, JESSICA, and CKD. He reports he had a CV in 2007 for afib. He had a successful CV in 3/2015. EKG in 4/2015 showed afib. In 8/2015 he had RFCA of persistent afib. He had maintained sinus rhythm for over one year and his amiodarone was discontinued in 10/2016. Had recurrent afib in 12/2016 and amiodarone was restarted. He had a CV 1/2017. Was referred to nephrology in 2/2019 for CKD and amiodarone use. At his visit in 7/2019 he complained of chest pain. LHC 7/2019 showed normal coronaries and NICM. In 8/2019 he had a CV. Echo in 10/2019 showed an EF of 55%. In 11/2019 he had RFCA for PAF and had remained in sinus at subsequent visits. Was back in afib 4/2020 and due to a lack of symptoms, amiodarone was stopped 6/2020. Today he is being seen for atrial fibrillation. EKG shows afib with a HR of 89. He is feeling well and denies chest pain, palpitations, shortness of breath, and dizziness. Past Medical History:   has a past medical history of Atrial fibrillation ([address] Cardiomyopathy ([address] Chest pain, CHF (congestive heart failure) ([address] CKD (chronic kidney disease) stage 3, GFR 30-59 ml/min, Family history of early CAD, Hyperlipidemia, Hypertension, JESSICA (obstructive sleep apnea), and S/P ablation of atrial fibrillation. Past Surgical History:   has a past surgical history that includes back surgery; hernia repair; Knee arthroscopy (Left, 03/20/2012); Cardioversion (03/23/2015); transesophageal echocardiogram (8/11/2015); ablation of dysrhythmic focus (08/17/2015); Cardioversion (01/10/2017); transesophageal echocardiogram (01/10/2017); Cardiac catheterization (07/25/2019); ablation of dysrhythmic focus (11/25/2019); Colonoscopy (N/A, 1/30/2020); Colonoscopy (1/30/2020); and Colonoscopy (N/A, 1/30/2020). Home Medications:    Prior to Admission medications    Medication Sig Start Date End Date Taking? Authorizing Provider   rivaroxaban (XARELTO) 20 MG TABS tablet TAKE 1 TABLET BY MOUTH EVERY DAY 12/1/20  Yes Vu Every, MD   eplerenone (INSPRA) 25 MG tablet TAKE 1 TABLET BY MOUTH EVERY DAY 10/2/20  Yes Vu Every, MD   furosemide (LASIX) 40 MG tablet TAKE 1 TABLET BY MOUTH  DAILY IN THE MORNING 9/22/20  Yes Vu Every, MD   pravastatin (PRAVACHOL) 40 MG tablet TAKE 1 TABLET BY MOUTH  DAILY 9/22/20  Yes Vu Every, MD   isosorbide mononitrate (IMDUR) 30 MG extended release tablet TAKE 1 TABLET BY MOUTH  EVERY DAY 9/22/20  Yes Vu Every, MD   lisinopril (PRINIVIL;ZESTRIL) 20 MG tablet TAKE 1 TABLET BY MOUTH TWO  TIMES DAILY 9/22/20  Yes Vu Every, MD   metoprolol succinate (TOPROL XL) 50 MG extended release tablet Take 1 tablet by mouth 2 times daily 9/15/20  Yes Vu Every, MD   hydrALAZINE (APRESOLINE) 25 MG tablet Take 1 tablet by mouth 2 times daily 9/15/20  Yes Vu Every, MD       Allergies:  Patient has no known allergies. Social History:   reports that he has never smoked. He has never used smokeless tobacco. He reports that he does not drink alcohol or use drugs. Family History: family history includes Cancer in his father; Diabetes in his brother and mother; Heart Disease in his mother and sister; High Blood Pressure in his mother; Osteoarthritis in his mother. Review of Systems   Constitutional: Negative. HENT: Negative. Eyes: Negative. Respiratory: Negative. Cardiovascular: see HPI  Gastrointestinal: Negative. Genitourinary: Negative. Musculoskeletal: Negative   Skin: Negative. Neurological: Negative. Hematological: Negative.     Psychiatric/Behavioral: Negative     PHYSICAL EXAM:      Vital signs:  /76   Pulse 84   Ht 6' 2\" (1.88 m)   Wt (!) 337 lb (152.9 kg)   SpO2 95%   BMI 43.27 kg/m²      Constitutional and General Appearance: alert, cooperative, no distress and appears stated age  [de-identified]: PERRL, no cervical lymphadenopathy. No masses palpable. Normal oral mucosa  Respiratory:  · Normal excursion and expansion without use of accessory muscles  · Resp Auscultation: Normal breath sounds without dullness or wheezing  Cardiovascular:  · The apical impulse is not displaced  · Heart tones are irregular   · Jugular venous pulsation Normal  · The carotid upstroke is normal in amplitude and contour without delay or bruit  · Peripheral pulses are symmetrical and full  Abdomen:  · No masses or tenderness  · Bowel sounds present  Extremities:  ·  No cyanosis or clubbing  ·  No BLE edema  · Skin: Warm and dry  Neurological:  · Alert and oriented. · Moves all extremities well  · No abnormalities of mood, affect, memory, mentation, or behavior are noted  · Exhibits normal gait, balance, and coordination. DATA:    EKG 12/2/2020: Afib HR 89    Limited echo 34/6/5966:  Normal systolic function with an estimated ejection fraction of 55%. Upper limits of normal left ventricle size. There is mild concentric left ventricular hypertrophy. No regional wall motion abnormalities are seen. Normal left ventricular diastolic filling pressure. The left atrium is mildly dilated. The right atrium is moderately dilated. Cath LV gram on 4/2019 showed EF of 40%. CHARLY 1/10/2017:  Ejection fraction is visually estimated to be 45-50 %.   Trivial mitral and tricuspid regurgitation.   No HARPREET clot   Compared to previous study from 8- ejection fraction remains unchanged. [facility] 7/25/2019 Nikunj Larson):  Procedures Performed:   1. Left heart catheterization  2. Selective left and right coronary angiogram  3. Left ventriculography      Procedure Findings:  1. Normal coronary angiogram  2. Reduced left ventricular function with EF estimated at 35-40%  3. Normal left heart hemodynamics    Stress 2/25/15  Impression:   1.  Small zone of anteroseptal reversibility, for which mild   ischemia should be considered in the appropriate clinical setting. No large areas of reversibility are seen. 2. Left ventricular ejection fraction is approximately 43%. Assessment:   1. Persistent atrial fibrillation: stable   -s/p RFCA 8/2015 (no known recurrence until 12/2016)   -s/p DCCV on 1/10/2017, recurrent 7/2019, s/p CV 8/2019              -s/p RFCA 11/2019   -recurrent 4/2020 (asymptomatic, amiodarone stopped 6/2020)              -[id number] score 3 (HTN, age, and CHF)  2. Sinus bradycardia: stable and historically asymptomatic  3. HTN: controlled   4. NICM: resolved     -EF 55% 10/2019  5. Chronic diastolic CHF: compensated   6. CKD stage III: Followed by Dr. Jarrett Hooks  7. Left renal atrophy: followed by urology   8. JESSICA: on CPAP, compliant     Plan:   1. Continue Xarelto, Toprol, eplerenone, Lasix, lisinopril, and Imdur   2. Annual CBC (due now, ordered) and BMP (due 9/2021)   3. Monitor BP and HR at home and call if consistently out of goal ranges  4. Can follow up with EP as needed and Dr. Eugene Callejas annually. Can follow up with EP if patient requests.       WESTON Callahan-GABRIELA  [address]  [phone]

## 2020-12-02 ENCOUNTER — OFFICE VISIT (OUTPATIENT)
Dept: CARDIOLOGY CLINIC | Age: 65
End: 2020-12-02
Payer: MEDICARE

## 2020-12-02 VITALS
SYSTOLIC BLOOD PRESSURE: 134 MMHG | WEIGHT: 315 LBS | HEIGHT: 74 IN | BODY MASS INDEX: 40.43 KG/M2 | DIASTOLIC BLOOD PRESSURE: 76 MMHG | HEART RATE: 84 BPM | OXYGEN SATURATION: 95 %

## 2020-12-02 PROCEDURE — 1123F ACP DISCUSS/DSCN MKR DOCD: CPT | Performed by: NURSE PRACTITIONER

## 2020-12-02 PROCEDURE — 99214 OFFICE O/P EST MOD 30 MIN: CPT | Performed by: NURSE PRACTITIONER

## 2020-12-02 PROCEDURE — 3017F COLORECTAL CA SCREEN DOC REV: CPT | Performed by: NURSE PRACTITIONER

## 2020-12-02 PROCEDURE — 93000 ELECTROCARDIOGRAM COMPLETE: CPT | Performed by: NURSE PRACTITIONER

## 2020-12-02 PROCEDURE — G8417 CALC BMI ABV UP PARAM F/U: HCPCS | Performed by: NURSE PRACTITIONER

## 2020-12-02 PROCEDURE — G8484 FLU IMMUNIZE NO ADMIN: HCPCS | Performed by: NURSE PRACTITIONER

## 2020-12-02 PROCEDURE — 4040F PNEUMOC VAC/ADMIN/RCVD: CPT | Performed by: NURSE PRACTITIONER

## 2020-12-02 PROCEDURE — 1036F TOBACCO NON-USER: CPT | Performed by: NURSE PRACTITIONER

## 2020-12-02 PROCEDURE — G8427 DOCREV CUR MEDS BY ELIG CLIN: HCPCS | Performed by: NURSE PRACTITIONER

## 2020-12-02 NOTE — LETTER
Home Medications:    Prior to Admission medications    Medication Sig Start Date End Date Taking? Authorizing Provider   rivaroxaban (XARELTO) 20 MG TABS tablet TAKE 1 TABLET BY MOUTH EVERY DAY 12/1/20  Yes Manoj Brown MD   eplerenone (INSPRA) 25 MG tablet TAKE 1 TABLET BY MOUTH EVERY DAY 10/2/20  Yes Manoj Brown MD   furosemide (LASIX) 40 MG tablet TAKE 1 TABLET BY MOUTH  DAILY IN THE MORNING 9/22/20  Yes Manoj Brown MD   pravastatin (PRAVACHOL) 40 MG tablet TAKE 1 TABLET BY MOUTH  DAILY 9/22/20  Yes Manoj Brown MD   isosorbide mononitrate (IMDUR) 30 MG extended release tablet TAKE 1 TABLET BY MOUTH  EVERY DAY 9/22/20  Yes Manoj Brown MD   lisinopril (PRINIVIL;ZESTRIL) 20 MG tablet TAKE 1 TABLET BY MOUTH TWO  TIMES DAILY 9/22/20  Yes Manoj Brown MD   metoprolol succinate (TOPROL XL) 50 MG extended release tablet Take 1 tablet by mouth 2 times daily 9/15/20  Lisset Brown MD   hydrALAZINE (APRESOLINE) 25 MG tablet Take 1 tablet by mouth 2 times daily 9/15/20  Yes Manoj Brown MD       Allergies:  Patient has no known allergies. Social History:   reports that he has never smoked. He has never used smokeless tobacco. He reports that he does not drink alcohol or use drugs. Family History: family history includes Cancer in his father; Diabetes in his brother and mother; Heart Disease in his mother and sister; High Blood Pressure in his mother; Osteoarthritis in his mother. Review of Systems   Constitutional: Negative. HENT: Negative. Eyes: Negative. Respiratory: Negative. Cardiovascular: see HPI  Gastrointestinal: Negative. Genitourinary: Negative. Musculoskeletal: Negative   Skin: Negative. Neurological: Negative. Hematological: Negative.     Psychiatric/Behavioral: Negative     PHYSICAL EXAM:      Vital signs:  /76   Pulse 84   Ht 6' 2\" (1.88 m)   Wt (!) 337 lb (152.9 kg)   SpO2 95%   BMI 43.27 kg/m² Constitutional and General Appearance: alert, cooperative, no distress and appears stated age  [de-identified]: PERRL, no cervical lymphadenopathy. No masses palpable. Normal oral mucosa  Respiratory:  · Normal excursion and expansion without use of accessory muscles  · Resp Auscultation: Normal breath sounds without dullness or wheezing  Cardiovascular:  · The apical impulse is not displaced  · Heart tones are irregular   · Jugular venous pulsation Normal  · The carotid upstroke is normal in amplitude and contour without delay or bruit  · Peripheral pulses are symmetrical and full  Abdomen:  · No masses or tenderness  · Bowel sounds present  Extremities:  ·  No cyanosis or clubbing  ·  No BLE edema  · Skin: Warm and dry  Neurological:  · Alert and oriented. · Moves all extremities well  · No abnormalities of mood, affect, memory, mentation, or behavior are noted  · Exhibits normal gait, balance, and coordination. DATA:    EKG 12/2/2020: Afib HR 89    Limited echo 46/5/1165:  Normal systolic function with an estimated ejection fraction of 55%. Upper limits of normal left ventricle size. There is mild concentric left ventricular hypertrophy. No regional wall motion abnormalities are seen. Normal left ventricular diastolic filling pressure. The left atrium is mildly dilated. The right atrium is moderately dilated. Cath LV gram on 4/2019 showed EF of 40%. CHARLY 1/10/2017:  Ejection fraction is visually estimated to be 45-50 %.   Trivial mitral and tricuspid regurgitation.   No HARPREET clot   Compared to previous study from 8- ejection fraction remains unchanged. [facility] 7/25/2019 Paige Ford):  Procedures Performed:   1. Left heart catheterization  2. Selective left and right coronary angiogram  3. Left ventriculography      Procedure Findings:  1. Normal coronary angiogram  2. Reduced left ventricular function with EF estimated at 35-40%  3.  Normal left heart hemodynamics    Stress 2/25/15  Impression: 1. Small zone of anteroseptal reversibility, for which mild   ischemia should be considered in the appropriate clinical setting. No large areas of reversibility are seen. 2. Left ventricular ejection fraction is approximately 43%. Assessment:   1. Persistent atrial fibrillation: stable   -s/p RFCA 8/2015 (no known recurrence until 12/2016)   -s/p DCCV on 1/10/2017, recurrent 7/2019, s/p CV 8/2019              -s/p RFCA 11/2019   -recurrent 4/2020 (asymptomatic, amiodarone stopped 6/2020)              -[id number] score 3 (HTN, age, and CHF)  2. Sinus bradycardia: stable and historically asymptomatic  3. HTN: controlled   4. NICM: resolved     -EF 55% 10/2019  5. Chronic diastolic CHF: compensated   6. CKD stage III: Followed by Dr. Krystyna Montero  7. Left renal atrophy: followed by urology   8. JESSICA: on CPAP, compliant     Plan:   1. Continue Xarelto, Toprol, eplerenone, Lasix, lisinopril, and Imdur   2. Annual CBC (due now, ordered) and BMP (due 9/2021)   3. Monitor BP and HR at home and call if consistently out of goal ranges  4. Can follow up with EP as needed and Dr. Reyna Flores annually. Can follow up with EP if patient requests.       WESTON Petersen-GABRIELA [address]  [phone]

## 2020-12-04 ENCOUNTER — VIRTUAL VISIT (OUTPATIENT)
Dept: FAMILY MEDICINE CLINIC | Age: 65
End: 2020-12-04
Payer: MEDICARE

## 2020-12-04 VITALS
BODY MASS INDEX: 40.43 KG/M2 | SYSTOLIC BLOOD PRESSURE: 136 MMHG | HEIGHT: 74 IN | WEIGHT: 315 LBS | DIASTOLIC BLOOD PRESSURE: 78 MMHG

## 2020-12-04 PROCEDURE — 1123F ACP DISCUSS/DSCN MKR DOCD: CPT | Performed by: FAMILY MEDICINE

## 2020-12-04 PROCEDURE — G8417 CALC BMI ABV UP PARAM F/U: HCPCS | Performed by: FAMILY MEDICINE

## 2020-12-04 PROCEDURE — 1036F TOBACCO NON-USER: CPT | Performed by: FAMILY MEDICINE

## 2020-12-04 PROCEDURE — 99214 OFFICE O/P EST MOD 30 MIN: CPT | Performed by: FAMILY MEDICINE

## 2020-12-04 PROCEDURE — 3017F COLORECTAL CA SCREEN DOC REV: CPT | Performed by: FAMILY MEDICINE

## 2020-12-04 PROCEDURE — G8484 FLU IMMUNIZE NO ADMIN: HCPCS | Performed by: FAMILY MEDICINE

## 2020-12-04 PROCEDURE — 4040F PNEUMOC VAC/ADMIN/RCVD: CPT | Performed by: FAMILY MEDICINE

## 2020-12-04 PROCEDURE — G0402 INITIAL PREVENTIVE EXAM: HCPCS | Performed by: FAMILY MEDICINE

## 2020-12-04 PROCEDURE — G8427 DOCREV CUR MEDS BY ELIG CLIN: HCPCS | Performed by: FAMILY MEDICINE

## 2020-12-04 ASSESSMENT — PATIENT HEALTH QUESTIONNAIRE - PHQ9
SUM OF ALL RESPONSES TO PHQ QUESTIONS 1-9: 0
2. FEELING DOWN, DEPRESSED OR HOPELESS: 0
1. LITTLE INTEREST OR PLEASURE IN DOING THINGS: 0
SUM OF ALL RESPONSES TO PHQ9 QUESTIONS 1 & 2: 0

## 2020-12-04 ASSESSMENT — ENCOUNTER SYMPTOMS: BLOOD IN STOOL: 0

## 2020-12-04 ASSESSMENT — LIFESTYLE VARIABLES: HOW OFTEN DO YOU HAVE A DRINK CONTAINING ALCOHOL: 0

## 2020-12-04 NOTE — PATIENT INSTRUCTIONS
disease, and certain medicines. But, there are steps you can take to sharpen your mind and help preserve your memory. Challenge Your Brain   Regularly challenging your mind may help keeps it in top shape. Good mental exercises include:   Crossword puzzlesUse a dictionary if you need it; you will learn more that way. Brainteasers Try some! Crafts, such as wood working and sewing   Hobbies, such as gardening and building model airplanes   SocializingVisit old friends or join groups to meet new ones. Reading   Learning a new language   Taking a class, whether it be art history or jabier chi   TravelingExperience the food, history, and culture of your destination   Learning to use a computer   Going to museums, the theater, or thought-provoking movies   Changing things in your daily life, such as reversing your pattern in the grocery store or brushing your teeth using your nondominant hand   Use Memory Aids   There is no need to remember every detail on your own. These memory aids can help:   Calendars and day planners   Electronic organizers to store all sorts of helpful informationThese devices can \"beep\" to remind you of appointments. A book of days to record birthdays, anniversaries, and other occasions that occur on the same date every year   Detailed \"to-do\" lists and strategically placed sticky notes   Quick \"study\" sessionsBefore a gathering, review who will be there so their names will be fresh in your mind. Establish routinesFor example, keep your keys, wallet, and umbrella in the same place all the time or take medicine with your 8:00 AM glass of juice   Live a Healthy Life   Many actions that will keep your body strong will do the same for your mind. For example:   Talk to Your Doctor About Herbs and Supplements    Malnutrition and vitamin deficiencies can impair your mental function. For example, vitamin B12 deficiency can cause a range of symptoms, including confusion.  But, what if your nutritional needs are being met? Can herbs and supplements still offer a benefit? Researchers have investigated a range of natural remedies, such as ginkgo , ginseng , and the supplement phosphatidylserine (PS). So far, though, the evidence is inconsistent as to whether these products can improve memory or thinking. If you are interested in taking herbs and supplements, talk to your doctor first because they may interact with other medicines that you are taking. Exercise Regularly    Among the many benefits of regular exercise are increased blood flow to the brain and decreased risk of certain diseases that can interfere with memory function. One study found that even moderate exercise has a beneficial effect. Examples of \"moderate\" exercise include:   Playing 18 holes of golf once a week, without a cart   Playing tennis twice a week   Walking one mile per day   Manage Stress    It can be tough to remember what is important when your mind is cluttered. Make time for relaxation. Choose activities that calm you down, and make it routine. Manage Chronic Conditions    Side effects of high blood pressure , diabetes, and heart disease can interfere with mental function. Many of the lifestyle steps discussed here can help manage these conditions. Strive to eat a healthy diet, exercise regularly, get stress under control, and follow your doctor's advice for your condition. Minimize Medications    Talk to your doctor about the medicines that you take. Some may be unnecessary. Also, healthy lifestyle habits may lower the need for certain drugs. Last Reviewed: April 2010 Costa Barry MD   Updated: 4/13/2010   ·   Personalized Preventive Plan for Melida Kruse - 12/4/2020  Medicare offers a range of preventive health benefits. Some of the tests and screenings are paid in full while other may be subject to a deductible, co-insurance, and/or copay.     Some of these benefits include a comprehensive review of your medical history including lifestyle, illnesses that may run in your family, and various assessments and screenings as appropriate. After reviewing your medical record and screening and assessments performed today your provider may have ordered immunizations, labs, imaging, and/or referrals for you. A list of these orders (if applicable) as well as your Preventive Care list are included within your After Visit Summary for your review. Other Preventive Recommendations:    A preventive eye exam performed by an eye specialist is recommended every 1-2 years to screen for glaucoma; cataracts, macular degeneration, and other eye disorders. A preventive dental visit is recommended every 6 months. Try to get at least 150 minutes of exercise per week or 10,000 steps per day on a pedometer . Order or download the FREE \"Exercise & Physical Activity: Your Everyday Guide\" from [employer]. Call 4-460.149.5400 or search [employer] online. You need 6167-8991 mg of calcium and 7986-1034 IU of vitamin D per day. It is possible to meet your calcium requirement with diet alone, but a vitamin D supplement is usually necessary to meet this goal.  When exposed to the sun, use a sunscreen that protects against both UVA and UVB radiation with an SPF of 30 or greater. Reapply every 2 to 3 hours or after sweating, drying off with a towel, or swimming. Always wear a seat belt when traveling in a car. Always wear a helmet when riding a bicycle or motorcycle.

## 2020-12-04 NOTE — PROGRESS NOTES
France Rubio is a 77 y.o. male    Chief Complaint   Patient presents with    Medicare AW    Hypertension    Hyperlipidemia    Atrial Fibrillation       HPI:    This is a video visit. Consent has been obtained. The patient is at home. Hypertension   This is a chronic problem. The current episode started more than 1 year ago. The problem is unchanged. The problem is controlled. Risk factors for coronary artery disease include male gender and obesity. Past treatments include diuretics, beta blockers, ACE inhibitors and direct vasodilators. The current treatment provides significant improvement. There are no compliance problems. There is no history of CVA. Hyperlipidemia   This is a chronic problem. The current episode started more than 1 year ago. The problem is controlled. Recent lipid tests were reviewed and are normal. He has no history of diabetes. Pertinent negatives include no myalgias. Current antihyperlipidemic treatment includes statins. The current treatment provides significant improvement of lipids. There are no compliance problems. Risk factors for coronary artery disease include male sex and hypertension. Paroxysmal atrial fibrillation. This is a chronic issue. No history of CVA. No rectal bleeding on Xarelto. ROS:    Review of Systems   Gastrointestinal: Negative for blood in stool. Musculoskeletal: Negative for myalgias. /78 Comment: pt reported  Ht 6' 2\" (1.88 m)   Wt (!) 337 lb (152.9 kg)   BMI 43.27 kg/m²     Physical Exam:    Physical Exam  Constitutional:       General: He is not in acute distress. Appearance: Normal appearance. He is obese. He is not ill-appearing or toxic-appearing. HENT:      Head: Normocephalic. Neurological:      Mental Status: He is alert. Psychiatric:         Mood and Affect: Mood normal.         Behavior: Behavior normal.         Thought Content:  Thought content normal.         Current Outpatient Medications   Medication Sig Dispense Refill    rivaroxaban (XARELTO) 20 MG TABS tablet TAKE 1 TABLET BY MOUTH EVERY DAY 90 tablet 3    eplerenone (INSPRA) 25 MG tablet TAKE 1 TABLET BY MOUTH EVERY DAY 30 tablet 2    furosemide (LASIX) 40 MG tablet TAKE 1 TABLET BY MOUTH  DAILY IN THE MORNING 90 tablet 3    pravastatin (PRAVACHOL) 40 MG tablet TAKE 1 TABLET BY MOUTH  DAILY 90 tablet 3    isosorbide mononitrate (IMDUR) 30 MG extended release tablet TAKE 1 TABLET BY MOUTH  EVERY DAY 90 tablet 3    lisinopril (PRINIVIL;ZESTRIL) 20 MG tablet TAKE 1 TABLET BY MOUTH TWO  TIMES DAILY 180 tablet 3    metoprolol succinate (TOPROL XL) 50 MG extended release tablet Take 1 tablet by mouth 2 times daily 180 tablet 3    hydrALAZINE (APRESOLINE) 25 MG tablet Take 1 tablet by mouth 2 times daily 180 tablet 3     No current facility-administered medications for this visit. Assessment:    1. Routine general medical examination at a health care facility    2. Essential hypertension    3. Mixed hyperlipidemia    4. Paroxysmal atrial fibrillation (HCC)    5. Stage 3a chronic kidney disease    6. Hyperglycemia        Plan:    1. Routine general medical examination at a health care facility    2. Essential hypertension  Stable. Continue current medications. 3. Mixed hyperlipidemia  Stable. Continue current medications. 4. Paroxysmal atrial fibrillation (HCC)  Stable. Continue current medications. 5. Stage 3a chronic kidney disease  Stable. Continue current medications. 6. Hyperglycemia  - Hemoglobin A1C; Future      Return in about 1 year (around 12/4/2021) for Hypertension, Hyperlipidemia, afib.

## 2020-12-04 NOTE — PROGRESS NOTES
Medicare Annual Wellness Visit  Name: January Quivers Date: 2020   MRN: <[id number]> Sex: Male   Age: 77 y.o. Ethnicity: Non-/Non    : 1955 Race: Nadira Tucker is here for Medicare AWV    Screenings for behavioral, psychosocial and functional/safety risks, and cognitive dysfunction are all negative except as indicated below. These results, as well as other patient data from the [address] form, are documented in Flowsheets linked to this Encounter. No Known Allergies    Prior to Visit Medications    Medication Sig Taking?  Authorizing Provider   rivaroxaban (XARELTO) 20 MG TABS tablet TAKE 1 TABLET BY MOUTH EVERY DAY  Charles Rao MD   eplerenone (INSPRA) 25 MG tablet TAKE 1 TABLET BY MOUTH EVERY DAY  Charles Rao MD   furosemide (LASIX) 40 MG tablet TAKE 1 TABLET BY MOUTH  DAILY IN THE MORNING  Charles Rao MD   pravastatin (PRAVACHOL) 40 MG tablet TAKE 1 TABLET BY MOUTH  DAILY  Charles Rao MD   isosorbide mononitrate (IMDUR) 30 MG extended release tablet TAKE 1 TABLET BY MOUTH  EVERY DAY  Charles Rao MD   lisinopril (PRINIVIL;ZESTRIL) 20 MG tablet TAKE 1 TABLET BY MOUTH TWO  TIMES DAILY  Charles Rao MD   metoprolol succinate (TOPROL XL) 50 MG extended release tablet Take 1 tablet by mouth 2 times daily  Charles Rao MD   hydrALAZINE (APRESOLINE) 25 MG tablet Take 1 tablet by mouth 2 times daily  Charles Rao MD       Past Medical History:   Diagnosis Date    [facility]     Cardiomyopathy ([facility] 2019    EF= 35-40%    Chest pain 2019    CHF (congestive heart failure) (HCC)     CKD (chronic kidney disease) stage 3, GFR 30-59 ml/min     has only one functioning kidney    Family history of early CAD     Hyperlipidemia     Hypertension     JESSICA (obstructive sleep apnea)     uses CPAP    S/P ablation of atrial fibrillation 2015,19    [facility]       Past Surgical History:   Procedure Laterality Date    ABLATION OF DYSRHYTHMIC FOCUS  08/17/2015    CHARLY/ Atrial Fib Ablation    ABLATION OF DYSRHYTHMIC FOCUS  11/25/2019    for Atrial Fib    BACK SURGERY      CARDIAC CATHETERIZATION  07/25/2019    Non- Ischemic Cardiomyopathy    CARDIOVERSION  03/23/2015    Atrial Fib to SR    CARDIOVERSION  01/10/2017    A Fib- SR    COLONOSCOPY N/A 1/30/2020    COLONOSCOPY W/ ANESTHESIA -SLEEP APNEA- performed by Clement Redmond MD at [facility]  1/30/2020    COLONOSCOPY WITH BIOPSY performed by Clement Redmond MD at [address] COLONOSCOPY N/A 1/30/2020    COLONOSCOPY POLYPECTOMY SNARE/COLD BIOPSY performed by Clement Redmond MD at [address] [place] [address]      umbilical    KNEE ARTHROSCOPY Left 03/20/2012    Left    TRANSESOPHAGEAL ECHOCARDIOGRAM  8/11/2015    TRANSESOPHAGEAL ECHOCARDIOGRAM  01/10/2017       Family History   Problem Relation Age of Onset    High Blood Pressure Mother     Heart Disease Mother     Diabetes Mother     Osteoarthritis Mother     Cancer Father     Heart Disease Sister     Diabetes Brother        CareTeam (Including outside providers/suppliers regularly involved in providing care):   Patient Care Team:  Hudson Santizo DO as PCP - General  Hudsondarci Santizo DO as PCP - [facility]  Jonathan Nuno MD as Consulting Physician (Cardiology)  Fanny Ng MD as Consulting Physician (Pulmonology)  WESTON Richardson - CNP as Nurse Practitioner (Family Nurse Practitioner)  Terrell Moreno MD as Consulting Physician (Urology)  Jonathan Nuno MD as Consulting Physician (Cardiology)    Wt Readings from Last 3 Encounters:   12/04/20 (!) 337 lb (152.9 kg)   12/02/20 (!) 337 lb (152.9 kg)   11/17/20 (!) 338 lb (153.3 kg)     Vitals:    12/04/20 1306   BP: 136/78   Weight: (!) 337 lb (152.9 kg)   Height: 6' 2\" (1.88 m)     Body mass index is 43.27 kg/m².     Based upon direct observation of the patient, evaluation of cognition reveals recent and remote memory intact. Patient's complete Health Risk Assessment and screening values have been reviewed and are found in Flowsheets. The following problems were reviewed today and where indicated follow up appointments were made and/or referrals ordered.     Positive Risk Factor Screenings with Interventions:     Health Habits/Nutrition:  Health Habits/Nutrition  Do you exercise for at least 20 minutes 2-3 times per week?: Yes  Have you lost any weight without trying in the past 3 months?: No  Do you eat fewer than 2 meals per day?: No  Have you seen a dentist within the past year?: Yes  Body mass index: (!) 43.26  Health Habits/Nutrition Interventions:  · Nutritional issues:  educational materials for healthy, well-balanced diet provided    Hearing/Vision:  No exam data present  Hearing/Vision  Do you or your family notice any trouble with your hearing?: No  Do you have difficulty driving, watching TV, or doing any of your daily activities because of your eyesight?: No  Have you had an eye exam within the past year?: (!) No  Hearing/Vision Interventions:  · Vision concerns:  patient encouraged to make appointment with his/her eye specialist    Personalized Preventive Plan   Current Health Maintenance Status  Immunization History   Administered Date(s) Administered    Influenza Virus Vaccine 09/01/2017    Influenza, MDCK Quadv, IM, PF (Flucelvax 4 yrs and older) 10/19/2018    Pneumococcal Polysaccharide ([id number]) 04/20/2016        Health Maintenance   Topic Date Due    DTaP/Tdap/Td vaccine (1 - Tdap) 03/17/1972    Shingles Vaccine (1 of 2) 03/17/2003    Annual Wellness Visit (AWV)  07/06/2020    Flu vaccine (1) 09/01/2020    A1C test (Diabetic or Prediabetic)  12/03/2020    Colon cancer screen colonoscopy  01/30/2021    Pneumococcal 65+ years Vaccine (1 of 1 - PPSV23) 04/20/2021    Lipid screen  04/23/2021    Potassium monitoring  09/24/2021    Creatinine monitoring  09/24/2021

## 2021-04-21 RX ORDER — EPLERENONE 25 MG/1
TABLET, FILM COATED ORAL
Qty: 90 TABLET | Refills: 1 | Status: SHIPPED | OUTPATIENT
Start: 2021-04-21 | End: 2021-10-14

## 2021-04-23 DIAGNOSIS — I50.32 CHRONIC DIASTOLIC CONGESTIVE HEART FAILURE (HCC): ICD-10-CM

## 2021-04-23 DIAGNOSIS — I10 ESSENTIAL HYPERTENSION: ICD-10-CM

## 2021-04-23 DIAGNOSIS — I48.92 ATRIAL FLUTTER, UNSPECIFIED TYPE (HCC): ICD-10-CM

## 2021-04-23 DIAGNOSIS — I42.8 NONISCHEMIC CARDIOMYOPATHY (HCC): ICD-10-CM

## 2021-04-23 DIAGNOSIS — R73.9 HYPERGLYCEMIA: ICD-10-CM

## 2021-04-23 LAB
A/G RATIO: 1.7 (ref 1.1–2.2)
ALBUMIN SERPL-MCNC: 4.1 G/DL (ref 3.4–5)
ALP BLD-CCNC: 68 U/L (ref 40–129)
ALT SERPL-CCNC: 21 U/L (ref 10–40)
ANION GAP SERPL CALCULATED.3IONS-SCNC: 12 MMOL/L (ref 3–16)
AST SERPL-CCNC: 14 U/L (ref 15–37)
BASOPHILS ABSOLUTE: 0.1 K/UL (ref 0–0.2)
BASOPHILS RELATIVE PERCENT: 0.7 %
BILIRUB SERPL-MCNC: 0.8 MG/DL (ref 0–1)
BUN BLDV-MCNC: 28 MG/DL (ref 7–20)
CALCIUM SERPL-MCNC: 9.3 MG/DL (ref 8.3–10.6)
CHLORIDE BLD-SCNC: 109 MMOL/L (ref 99–110)
CHOLESTEROL, FASTING: 166 MG/DL (ref 0–199)
CO2: 27 MMOL/L (ref 21–32)
CREAT SERPL-MCNC: 1.5 MG/DL (ref 0.8–1.3)
EOSINOPHILS ABSOLUTE: 0.1 K/UL (ref 0–0.6)
EOSINOPHILS RELATIVE PERCENT: 1.7 %
GFR AFRICAN AMERICAN: 56
GFR NON-AFRICAN AMERICAN: 47
GLOBULIN: 2.4 G/DL
GLUCOSE BLD-MCNC: 103 MG/DL (ref 70–99)
HCT VFR BLD CALC: 47.1 % (ref 40.5–52.5)
HDLC SERPL-MCNC: 37 MG/DL (ref 40–60)
HEMOGLOBIN: 15.4 G/DL (ref 13.5–17.5)
LDL CHOLESTEROL CALCULATED: 87 MG/DL
LYMPHOCYTES ABSOLUTE: 1.7 K/UL (ref 1–5.1)
LYMPHOCYTES RELATIVE PERCENT: 21.7 %
MCH RBC QN AUTO: 29 PG (ref 26–34)
MCHC RBC AUTO-ENTMCNC: 32.7 G/DL (ref 31–36)
MCV RBC AUTO: 88.5 FL (ref 80–100)
MONOCYTES ABSOLUTE: 0.7 K/UL (ref 0–1.3)
MONOCYTES RELATIVE PERCENT: 8.6 %
NEUTROPHILS ABSOLUTE: 5.3 K/UL (ref 1.7–7.7)
NEUTROPHILS RELATIVE PERCENT: 67.3 %
PDW BLD-RTO: 14.1 % (ref 12.4–15.4)
PLATELET # BLD: 250 K/UL (ref 135–450)
PMV BLD AUTO: 8.4 FL (ref 5–10.5)
POTASSIUM SERPL-SCNC: 4.2 MMOL/L (ref 3.5–5.1)
RBC # BLD: 5.32 M/UL (ref 4.2–5.9)
SODIUM BLD-SCNC: 148 MMOL/L (ref 136–145)
TOTAL PROTEIN: 6.5 G/DL (ref 6.4–8.2)
TRIGLYCERIDE, FASTING: 209 MG/DL (ref 0–150)
TSH REFLEX FT4: 1.13 UIU/ML (ref 0.27–4.2)
VLDLC SERPL CALC-MCNC: 42 MG/DL
WBC # BLD: 7.9 K/UL (ref 4–11)

## 2021-04-24 LAB
ESTIMATED AVERAGE GLUCOSE: 128.4 MG/DL
HBA1C MFR BLD: 6.1 %

## 2021-04-27 ENCOUNTER — TELEPHONE (OUTPATIENT)
Dept: CARDIOLOGY CLINIC | Age: 66
End: 2021-04-27

## 2021-04-27 NOTE — TELEPHONE ENCOUNTER
----- Message from Bina Magana MD sent at 4/24/2021  2:12 PM EDT -----  On blood test kidneys are slightly weaker otherwise ok.

## 2021-05-10 ENCOUNTER — TELEPHONE (OUTPATIENT)
Dept: FAMILY MEDICINE CLINIC | Age: 66
End: 2021-05-10

## 2021-05-17 RX ORDER — FUROSEMIDE 40 MG/1
TABLET ORAL
Qty: 90 TABLET | Refills: 3 | Status: SHIPPED | OUTPATIENT
Start: 2021-05-17 | End: 2021-11-29 | Stop reason: SDUPTHER

## 2021-05-17 NOTE — TELEPHONE ENCOUNTER
He needs to get blood work   I think he has an order in epic  If not I can put it in  Just let me know.

## 2021-05-21 ENCOUNTER — IMMUNIZATION (OUTPATIENT)
Dept: PRIMARY CARE CLINIC | Age: 66
End: 2021-05-21
Payer: MEDICARE

## 2021-05-21 PROCEDURE — 0001A COVID-19, PFIZER VACCINE 30MCG/0.3ML DOSE: CPT | Performed by: FAMILY MEDICINE

## 2021-05-21 PROCEDURE — 91300 COVID-19, PFIZER VACCINE 30MCG/0.3ML DOSE: CPT | Performed by: FAMILY MEDICINE

## 2021-06-03 ENCOUNTER — VIRTUAL VISIT (OUTPATIENT)
Dept: FAMILY MEDICINE CLINIC | Age: 66
End: 2021-06-03
Payer: MEDICARE

## 2021-06-03 DIAGNOSIS — J01.90 ACUTE NON-RECURRENT SINUSITIS, UNSPECIFIED LOCATION: Primary | ICD-10-CM

## 2021-06-03 DIAGNOSIS — E66.01 OBESITY, CLASS III, BMI 40-49.9 (MORBID OBESITY) (HCC): ICD-10-CM

## 2021-06-03 DIAGNOSIS — J40 BRONCHITIS: ICD-10-CM

## 2021-06-03 PROCEDURE — 4040F PNEUMOC VAC/ADMIN/RCVD: CPT | Performed by: FAMILY MEDICINE

## 2021-06-03 PROCEDURE — 3017F COLORECTAL CA SCREEN DOC REV: CPT | Performed by: FAMILY MEDICINE

## 2021-06-03 PROCEDURE — G8427 DOCREV CUR MEDS BY ELIG CLIN: HCPCS | Performed by: FAMILY MEDICINE

## 2021-06-03 PROCEDURE — 1036F TOBACCO NON-USER: CPT | Performed by: FAMILY MEDICINE

## 2021-06-03 PROCEDURE — 99213 OFFICE O/P EST LOW 20 MIN: CPT | Performed by: FAMILY MEDICINE

## 2021-06-03 PROCEDURE — 1123F ACP DISCUSS/DSCN MKR DOCD: CPT | Performed by: FAMILY MEDICINE

## 2021-06-03 PROCEDURE — G8417 CALC BMI ABV UP PARAM F/U: HCPCS | Performed by: FAMILY MEDICINE

## 2021-06-03 RX ORDER — FLUTICASONE PROPIONATE 50 MCG
2 SPRAY, SUSPENSION (ML) NASAL DAILY
Qty: 1 BOTTLE | Refills: 5 | Status: SHIPPED | OUTPATIENT
Start: 2021-06-03

## 2021-06-03 RX ORDER — AZITHROMYCIN 250 MG/1
TABLET, FILM COATED ORAL
Qty: 1 PACKET | Refills: 0 | Status: SHIPPED | OUTPATIENT
Start: 2021-06-03 | End: 2021-12-10 | Stop reason: ALTCHOICE

## 2021-06-03 SDOH — ECONOMIC STABILITY: TRANSPORTATION INSECURITY
IN THE PAST 12 MONTHS, HAS THE LACK OF TRANSPORTATION KEPT YOU FROM MEDICAL APPOINTMENTS OR FROM GETTING MEDICATIONS?: NO

## 2021-06-03 SDOH — ECONOMIC STABILITY: FOOD INSECURITY: WITHIN THE PAST 12 MONTHS, YOU WORRIED THAT YOUR FOOD WOULD RUN OUT BEFORE YOU GOT MONEY TO BUY MORE.: NEVER TRUE

## 2021-06-03 SDOH — ECONOMIC STABILITY: FOOD INSECURITY: WITHIN THE PAST 12 MONTHS, THE FOOD YOU BOUGHT JUST DIDN'T LAST AND YOU DIDN'T HAVE MONEY TO GET MORE.: NEVER TRUE

## 2021-06-03 SDOH — ECONOMIC STABILITY: TRANSPORTATION INSECURITY
IN THE PAST 12 MONTHS, HAS LACK OF TRANSPORTATION KEPT YOU FROM MEETINGS, WORK, OR FROM GETTING THINGS NEEDED FOR DAILY LIVING?: NO

## 2021-06-03 ASSESSMENT — ENCOUNTER SYMPTOMS
COUGH: 1
SHORTNESS OF BREATH: 1
SINUS COMPLAINT: 1

## 2021-06-03 ASSESSMENT — PATIENT HEALTH QUESTIONNAIRE - PHQ9
2. FEELING DOWN, DEPRESSED OR HOPELESS: 0
SUM OF ALL RESPONSES TO PHQ QUESTIONS 1-9: 0
SUM OF ALL RESPONSES TO PHQ9 QUESTIONS 1 & 2: 0
1. LITTLE INTEREST OR PLEASURE IN DOING THINGS: 0
SUM OF ALL RESPONSES TO PHQ QUESTIONS 1-9: 0
SUM OF ALL RESPONSES TO PHQ QUESTIONS 1-9: 0

## 2021-06-03 ASSESSMENT — SOCIAL DETERMINANTS OF HEALTH (SDOH): HOW HARD IS IT FOR YOU TO PAY FOR THE VERY BASICS LIKE FOOD, HOUSING, MEDICAL CARE, AND HEATING?: NOT HARD AT ALL

## 2021-06-03 NOTE — PROGRESS NOTES
Amie Butler is a 74 y.o. male    Chief Complaint   Patient presents with    Sinus Problem     cutting grass, in and out of rain, pressure in eye and nose areas    Shortness of Breath     started 3-4 days tightness in stomach, near rib cage    Congestion     Nasal, drains into throat, with clear mucos    Eye Drainage     watery       HPI:    This is a video visit. Consent has been obtained. The patient is at home. Sinus Problem  This is a new problem. The current episode started in the past 7 days. The problem has been gradually worsening since onset. There has been no fever. Associated symptoms include coughing and shortness of breath. Past treatments include nothing. Shortness of Breath  This is a new problem. The current episode started in the past 7 days. The problem occurs intermittently. The problem has been waxing and waning. Pertinent negatives include no chest pain or fever. Nothing aggravates the symptoms. He has tried nothing for the symptoms. ROS:    Review of Systems   Constitutional: Negative for fever. Respiratory: Positive for cough and shortness of breath. Cardiovascular: Negative for chest pain. There were no vitals taken for this visit. Physical Exam:    Physical Exam  Constitutional:       General: He is not in acute distress. Appearance: Normal appearance. He is obese. He is not ill-appearing or toxic-appearing. HENT:      Head: Normocephalic. Neurological:      Mental Status: He is alert. Psychiatric:         Mood and Affect: Mood normal.         Behavior: Behavior normal.         Thought Content:  Thought content normal.         Current Outpatient Medications   Medication Sig Dispense Refill    fluticasone (FLONASE) 50 MCG/ACT nasal spray 2 sprays by Each Nostril route daily 1 Bottle 5    azithromycin (ZITHROMAX) 250 MG tablet Take 2 tabs (500 mg) on Day 1, and take 1 tab (250 mg) on days 2 through 5. 1 packet 0    furosemide (LASIX) 40 MG tablet TAKE 1 TABLET BY MOUTH EVERY DAY IN THE MORNING 90 tablet 3    eplerenone (INSPRA) 25 MG tablet TAKE 1 TABLET BY MOUTH EVERY DAY 90 tablet 1    rivaroxaban (XARELTO) 20 MG TABS tablet TAKE 1 TABLET BY MOUTH EVERY DAY 90 tablet 3    pravastatin (PRAVACHOL) 40 MG tablet TAKE 1 TABLET BY MOUTH  DAILY 90 tablet 3    isosorbide mononitrate (IMDUR) 30 MG extended release tablet TAKE 1 TABLET BY MOUTH  EVERY DAY 90 tablet 3    lisinopril (PRINIVIL;ZESTRIL) 20 MG tablet TAKE 1 TABLET BY MOUTH TWO  TIMES DAILY 180 tablet 3    metoprolol succinate (TOPROL XL) 50 MG extended release tablet Take 1 tablet by mouth 2 times daily 180 tablet 3    hydrALAZINE (APRESOLINE) 25 MG tablet Take 1 tablet by mouth 2 times daily 180 tablet 3     No current facility-administered medications for this visit. Assessment:    1. Acute non-recurrent sinusitis, unspecified location    2. Bronchitis    3. Obesity, Class III, BMI 40-49.9 (morbid obesity) ([facility]        Plan:    1. Acute non-recurrent sinusitis, unspecified location  - azithromycin (ZITHROMAX) 250 MG tablet; Take 2 tabs (500 mg) on Day 1, and take 1 tab (250 mg) on days 2 through 5. Dispense: 1 packet; Refill: 0    2. Bronchitis  Go to ER if having severe SOB/chest pain. - azithromycin (ZITHROMAX) 250 MG tablet; Take 2 tabs (500 mg) on Day 1, and take 1 tab (250 mg) on days 2 through 5. Dispense: 1 packet; Refill: 0    3. Obesity, Class III, BMI 40-49.9 (morbid obesity) ([facility]  Encouraged a diet low in saturated fat/cholesterol, small portion sizes and regular exercises. Patient to return to clinic if symptoms worsen or fail to improve.

## 2021-06-11 ENCOUNTER — IMMUNIZATION (OUTPATIENT)
Dept: PRIMARY CARE CLINIC | Age: 66
End: 2021-06-11
Payer: MEDICARE

## 2021-06-11 PROCEDURE — 91300 COVID-19, PFIZER VACCINE 30MCG/0.3ML DOSE: CPT | Performed by: FAMILY MEDICINE

## 2021-06-11 PROCEDURE — 0002A COVID-19, PFIZER VACCINE 30MCG/0.3ML DOSE: CPT | Performed by: FAMILY MEDICINE

## 2021-06-14 ENCOUNTER — NURSE TRIAGE (OUTPATIENT)
Dept: OTHER | Facility: CLINIC | Age: 66
End: 2021-06-14

## 2021-06-14 ENCOUNTER — APPOINTMENT (OUTPATIENT)
Dept: GENERAL RADIOLOGY | Age: 66
End: 2021-06-14
Payer: MEDICARE

## 2021-06-14 ENCOUNTER — HOSPITAL ENCOUNTER (EMERGENCY)
Age: 66
Discharge: HOME | End: 2021-06-14
Payer: MEDICARE

## 2021-06-14 VITALS
DIASTOLIC BLOOD PRESSURE: 98 MMHG | OXYGEN SATURATION: 96 % | RESPIRATION RATE: 17 BRPM | SYSTOLIC BLOOD PRESSURE: 123 MMHG | BODY MASS INDEX: 40.43 KG/M2 | HEIGHT: 74 IN | WEIGHT: 315 LBS | TEMPERATURE: 98.1 F | HEART RATE: 66 BPM

## 2021-06-14 DIAGNOSIS — F41.1 ANXIETY STATE: ICD-10-CM

## 2021-06-14 DIAGNOSIS — G47.9 SLEEP DISTURBANCES: Primary | ICD-10-CM

## 2021-06-14 LAB
A/G RATIO: 1.4 (ref 1.1–2.2)
ALBUMIN SERPL-MCNC: 3.8 G/DL (ref 3.4–5)
ALP BLD-CCNC: 61 U/L (ref 40–129)
ALT SERPL-CCNC: 15 U/L (ref 10–40)
ANION GAP SERPL CALCULATED.3IONS-SCNC: 10 MMOL/L (ref 3–16)
AST SERPL-CCNC: 16 U/L (ref 15–37)
BILIRUB SERPL-MCNC: 0.9 MG/DL (ref 0–1)
BUN BLDV-MCNC: 29 MG/DL (ref 7–20)
CALCIUM SERPL-MCNC: 9.3 MG/DL (ref 8.3–10.6)
CHLORIDE BLD-SCNC: 104 MMOL/L (ref 99–110)
CO2: 25 MMOL/L (ref 21–32)
CREAT SERPL-MCNC: 1.3 MG/DL (ref 0.8–1.3)
GFR AFRICAN AMERICAN: >60
GFR NON-AFRICAN AMERICAN: 55
GLOBULIN: 2.7 G/DL
GLUCOSE BLD-MCNC: 104 MG/DL (ref 70–99)
POTASSIUM REFLEX MAGNESIUM: 4.3 MMOL/L (ref 3.5–5.1)
SODIUM BLD-SCNC: 139 MMOL/L (ref 136–145)
TOTAL PROTEIN: 6.5 G/DL (ref 6.4–8.2)
TROPONIN: <0.01 NG/ML

## 2021-06-14 PROCEDURE — 84484 ASSAY OF TROPONIN QUANT: CPT

## 2021-06-14 PROCEDURE — 99283 EMERGENCY DEPT VISIT LOW MDM: CPT

## 2021-06-14 PROCEDURE — 71046 X-RAY EXAM CHEST 2 VIEWS: CPT

## 2021-06-14 PROCEDURE — 93005 ELECTROCARDIOGRAM TRACING: CPT | Performed by: STUDENT IN AN ORGANIZED HEALTH CARE EDUCATION/TRAINING PROGRAM

## 2021-06-14 PROCEDURE — 80053 COMPREHEN METABOLIC PANEL: CPT

## 2021-06-14 RX ORDER — LANOLIN ALCOHOL/MO/W.PET/CERES
5 CREAM (GRAM) TOPICAL DAILY
Qty: 60 TABLET | Refills: 3 | Status: SHIPPED | OUTPATIENT
Start: 2021-06-14

## 2021-06-14 RX ORDER — HYDROXYZINE HYDROCHLORIDE 25 MG/1
25 TABLET, FILM COATED ORAL EVERY 8 HOURS PRN
Qty: 30 TABLET | Refills: 0 | Status: SHIPPED | OUTPATIENT
Start: 2021-06-14 | End: 2021-06-24 | Stop reason: SDUPTHER

## 2021-06-14 NOTE — ED NOTES
Pt states Sob in am when arises from sleeping states has not slept in 3 weeks - has sleep apnea (wears CPAP)  / denies Chest pain/jaw pain or radiating arm pain   States becomes very anxious in am when he wakens - SOB is more severe at this time   : pt respirations unlabored at rest at present- lungs clear to ausculation bilaterally - : pt completing sentences without SOB  / trace ankle edema noted        MARYAN Snowden  06/14/21 [id number]

## 2021-06-14 NOTE — TELEPHONE ENCOUNTER
Reason for Disposition   MODERATE difficulty breathing (e.g., speaks in phrases, SOB even at rest, pulse 100-120) of new onset or worse than normal    Answer Assessment - Initial Assessment Questions  1. RESPIRATORY STATUS: \"Describe your breathing? \" (e.g., wheezing, shortness of breath, unable to speak, severe coughing)       A small amount of coughing. He notices himself getting SOB more often. He doesn't like the term SOB because he doesn't feel like its accurate. Pt would agree that he has an increased work of breathing. It is interfering with his sleep. 2. ONSET: \"When did this breathing problem begin? \"       3 weeks ago. 3. PATTERN \"Does the difficult breathing come and go, or has it been constant since it started? \"       Comes and goes. He notices it happens more when he lays down. 4. SEVERITY: \"How bad is your breathing? \" (e.g., mild, moderate, severe)     - MILD: No SOB at rest, mild SOB with walking, speaks normally in sentences, can lay down, no retractions, pulse < 100.     - MODERATE: SOB at rest, SOB with minimal exertion and prefers to sit, cannot lie down flat, speaks in phrases, mild retractions, audible wheezing, pulse 100-120.     - SEVERE: Very SOB at rest, speaks in single words, struggling to breathe, sitting hunched forward, retractions, pulse > 120   Moderate. He states he can walk and it doesn't bother him. 5. RECURRENT SYMPTOM: \"Have you had difficulty breathing before? \" If so, ask: \"When was the last time? \" and \"What happened that time? \"       Denies. 6. CARDIAC HISTORY: \"Do you have any history of heart disease? \" (e.g., heart attack, angina, bypass surgery, angioplasty)   CHF. AFIB. 7. LUNG HISTORY: \"Do you have any history of lung disease? \"  (e.g., pulmonary embolus, asthma, emphysema)      Denies. 8. CAUSE: \"What do you think is causing the breathing problem? \"       COVID Vaccine, 1st dose 5/21 and 2nd dose 6/11    9.  OTHER SYMPTOMS: \"Do you have any other symptoms? (e.g., dizziness, runny nose, cough, chest pain, fever)      A small cough. 10. PREGNANCY: \"Is there any chance you are pregnant? \" \"When was your last menstrual period? \"  N/A          11. TRAVEL: \"Have you traveled out of the country in the last month? \" (e.g., travel history, exposures)        Denies. Protocols used: BREATHING DIFFICULTY-ADULT-[place]    Received call from Rajan Babb at pre-service [facility] with Red Flag Complaint. Brief description of triage: Breathing difficulty, see above. Triage indicates for patient to go to the ER now. Please have someone else drive. Writer reiterated this recommendation is from [facility]. If new or worsening symptoms appear, call 911. Care advice provided, patient verbalizes understanding; denies any other questions or concerns; instructed to call back for any new or worsening symptoms. Attention Provider: Thank you for allowing me to participate in the care of your patient. The patient was connected to triage in response to information provided to the [facility]. Please do not respond through this encounter as the response is not directed to a shared pool.

## 2021-06-14 NOTE — ED NOTES
Labs vs obtained   ekg obtained and waiting for physician interpretation   Pt assisted to waiting room gait steady denies pain      MARYAN Snowden  06/14/21 0234

## 2021-06-14 NOTE — ED NOTES
Discharge instructions reviewed with patient. Reviewed prescriptions with patient. No additional questions asked. Voiced understanding. Encouraged patient to follow up as discussed by the ED physician.      Gonzalo Vasquez RN  06/14/21 [id number]

## 2021-06-14 NOTE — ED PROVIDER NOTES
EKG interpreted by me in isolation, as I have not yet evaluated the patient, taken at 1635 shows atrial fibrillation with a rate of 100, left axis deviation, no T wave inversions, no ST elevations or depressions, QRS 86, QTc 441, nonspecific EKG     Marielle Claros MD  06/14/21 4793

## 2021-06-15 LAB
EKG ATRIAL RATE: 108 BPM
EKG DIAGNOSIS: NORMAL
EKG Q-T INTERVAL: 342 MS
EKG QRS DURATION: 86 MS
EKG QTC CALCULATION (BAZETT): 441 MS
EKG R AXIS: -25 DEGREES
EKG T AXIS: -52 DEGREES
EKG VENTRICULAR RATE: 100 BPM

## 2021-06-15 PROCEDURE — 93010 ELECTROCARDIOGRAM REPORT: CPT | Performed by: INTERNAL MEDICINE

## 2021-06-17 NOTE — ED PROVIDER NOTES
[facility] Emergency Department    CHIEF COMPLAINT  Other (pt state he received 2nd covid vaccine on Friday and since then has been SOB and anxious )      SHARED SERVICE VISIT  Evaluated by DANYA. My supervising physician was available for consultation. HISTORY OF PRESENT ILLNESS  Maximiliano Shepherd is a 74 y.o. male history of CKD, A. fib; anticoagulated; JESSICA; presents emergency department for evaluation of shortness of breath and anxiety has been ongoing for the past 2 months. Patient states that since he received his first of the vaccination he has had recurrent episodes of anxiety and shortness of breath that occur at night upon trying to go to sleep. Patient states that over this time he has been exercising frequently without any difficulty breathing or chest pain. Patient states he lost roughly 15 pounds. Patient states that when he exercises he has no chest pain, shortness of breath according breathing. Patient that his symptoms from occur upon trying to go to sleep when he has a sensation of anxiety as well as a rapid breathing and shortness of breath sensation that he says originates from his abdomen. Patient states that this is caused him to lose many hours of sleep. Patient does elicit that he experienced large amount of anxiety and consideration prior to receiving his first vaccination shot. To alleviate his symptoms he states he can set up but it paces around the house. Patient has not tried any over-the-counter medications. Patient also reports that he has been working on a turn for his  wife which could also provide source of his anxiety. No other complaints, modifying factors or associated symptoms. Nursing notes reviewed.    Past Medical History:   Diagnosis Date    Atrial fibrillation ([facility]     Cardiomyopathy ([facility] 2019    EF= 35-40%    Chest pain 2019    CHF (congestive heart failure) (HCC)     CKD (chronic kidney disease) stage 3, GFR 30-59 ml/min (HCC)     has only one functioning kidney    Family history of early CAD     Hyperlipidemia     Hypertension     PARISH (obstructive sleep apnea)     uses CPAP    S/P ablation of atrial fibrillation 8/11/2015,11-25-19    RFCA     Past Surgical History:   Procedure Laterality Date    ABLATION OF DYSRHYTHMIC FOCUS  08/17/2015    CHARLY/ Atrial Fib Ablation    ABLATION OF DYSRHYTHMIC FOCUS  11/25/2019    for Atrial Fib    BACK SURGERY      CARDIAC CATHETERIZATION  07/25/2019    Non- Ischemic Cardiomyopathy    CARDIOVERSION  03/23/2015    Atrial Fib to SR    CARDIOVERSION  01/10/2017    A Fib- SR    COLONOSCOPY N/A 1/30/2020    COLONOSCOPY W/ ANESTHESIA -SLEEP APNEA- performed by Austin Price MD at [address] [place] [address]  1/30/2020    COLONOSCOPY WITH BIOPSY performed by Austin Price MD at [address] [place] [address] N/A 1/30/2020    COLONOSCOPY POLYPECTOMY SNARE/COLD BIOPSY performed by Austin Price MD at [address] [place] [address]      umbilical    KNEE ARTHROSCOPY Left 03/20/2012    Left    TRANSESOPHAGEAL ECHOCARDIOGRAM  8/11/2015    TRANSESOPHAGEAL ECHOCARDIOGRAM  01/10/2017     Family History   Problem Relation Age of Onset    High Blood Pressure Mother     Heart Disease Mother     Diabetes Mother     Osteoarthritis Mother     Cancer Father     Heart Disease Sister     Diabetes Brother      Social History     Socioeconomic History    Marital status:       Spouse name: Not on file    Number of children: Not on file    Years of education: 15    Highest education level: Not on file   Occupational History    Occupation:      Employer: [employer]   Tobacco Use    Smoking status: Never Smoker    Smokeless tobacco: Never Used   Vaping Use    Vaping Use: Never used   Substance and Sexual Activity    Alcohol use: No     Alcohol/week: 0.0 standard drinks    Drug use: No    Sexual activity: Not Currently     Partners: Female   Other Topics Concern    Not on file   Social History Narrative    Not on file     Social Determinants of Health     Financial Resource Strain: Low Risk     Difficulty of Paying Living Expenses: Not hard at all   Food Insecurity: No Food Insecurity    Worried About Running Out of Food in the Last Year: Never true    [address] in the Last Year: Never true   Transportation Needs: No Transportation Needs    Lack of Transportation (Medical): No    Lack of Transportation (Non-Medical): No   Physical Activity:     Days of Exercise per Week:     Minutes of Exercise per Session:    Stress:     Feeling of Stress :    Social Connections:     Frequency of Communication with Friends and Family:     Frequency of Social Gatherings with Friends and Family:     Attends Hoahaoism Services:     Active Member of Clubs or Organizations:     Attends Club or Organization Meetings:     Marital Status:    Intimate Partner Violence:     Fear of Current or Ex-Partner:     Emotionally Abused:     Physically Abused:     Sexually Abused:      No current facility-administered medications for this encounter.      Current Outpatient Medications   Medication Sig Dispense Refill    hydrOXYzine (ATARAX) 25 MG tablet Take 1 tablet by mouth every 8 hours as needed for Itching 30 tablet 0    melatonin (RA MELATONIN) 3 MG TABS tablet Take 1.5 tablets by mouth daily 60 tablet 3    fluticasone (FLONASE) 50 MCG/ACT nasal spray 2 sprays by Each Nostril route daily 1 Bottle 5    azithromycin (ZITHROMAX) 250 MG tablet Take 2 tabs (500 mg) on Day 1, and take 1 tab (250 mg) on days 2 through 5. 1 packet 0    furosemide (LASIX) 40 MG tablet TAKE 1 TABLET BY MOUTH EVERY DAY IN THE MORNING 90 tablet 3    eplerenone (INSPRA) 25 MG tablet TAKE 1 TABLET BY MOUTH EVERY DAY 90 tablet 1    rivaroxaban (XARELTO) 20 MG TABS tablet TAKE 1 TABLET BY MOUTH EVERY DAY 90 tablet 3    pravastatin (PRAVACHOL) 40 MG tablet TAKE 1 TABLET BY MOUTH  DAILY 90 tablet 3    isosorbide mononitrate (IMDUR) 30 MG extended release tablet TAKE 1 TABLET BY MOUTH  EVERY DAY 90 tablet 3    lisinopril (PRINIVIL;ZESTRIL) 20 MG tablet TAKE 1 TABLET BY MOUTH TWO  TIMES DAILY 180 tablet 3    metoprolol succinate (TOPROL XL) 50 MG extended release tablet Take 1 tablet by mouth 2 times daily 180 tablet 3    hydrALAZINE (APRESOLINE) 25 MG tablet Take 1 tablet by mouth 2 times daily 180 tablet 3     No Known Allergies    REVIEW OF SYSTEMS  10 systems reviewed, pertinent positives per HPI otherwise noted to be negative    PHYSICAL EXAM  BP (!) 123/98   Pulse 66   Temp 98.1 °F (36.7 °C) (Oral)   Resp 17   Ht 6' 2\" (1.88 m)   Wt (!) 330 lb (149.7 kg)   SpO2 96%   BMI 42.37 kg/m²   GENERAL APPEARANCE: Awake and alert. Cooperative. No acute distress  HEAD: Normocephalic. Atraumatic. EYES: PERRL. EOM's grossly intact. ENT: Mucous membranes are moist.   NECK: Supple. HEART: RRR. No murmurs. LUNGS: Respirations unlabored. CTAB. Good air exchange. Speaking comfortably in full sentences. No wheeze or crackles  ABDOMEN: Soft. Non-distended. Non-tender. No guarding or rebound. No masses. No organomegaly. EXTREMITIES: No peripheral edema. Moves all extremities equally. All extremities neurovascularly intact. SKIN: Warm and dry. No acute rashes. NEUROLOGICAL: Alert and oriented. CN's 2-12 intact. No gross facial drooping. Strength 5/5, sensation intact. PSYCHIATRIC: Normal mood and affect. Acting appropriately. RADIOLOGY  XR CHEST (2 VW)   Final Result   Stable chronic changes with no acute abnormality seen.                LABS  Labs Reviewed   COMPREHENSIVE METABOLIC PANEL W/ REFLEX TO MG FOR LOW K - Abnormal; Notable for the following components:       Result Value    Glucose 104 (*)     BUN 29 (*)     GFR Non- 55 (*)     All other components within normal limits    Narrative:     Performed at: [facility]                  [address] [facility], [address] [place]   Phone [phone]   TROPONIN    Narrative:     Performed at:                  [facility]                  [address] [facility],  [place], [address] [place]   Phone [phone]   CBC WITH AUTO DIFFERENTIAL       PROCEDURES  Unless otherwise noted below, none  Procedures        MDM  MDM  This is a 68-year-old male with a history of A. fib, anticoagulated, sleep apnea and congestive heart failure who presents emergency department for evaluation of recurrent shortness of breath episodes of anxiety that only occurs at night when he is trying to sleep. Patient appears a good insight into his symptoms and does report frequent exercise without any difficulty breathing or chest pain. Patient uses a sleep apnea device at night and states he has an upcoming appointment with the sleep medicine provider for reevaluation. On arrival to the ED patient's vitals within normal limits. Afebrile nontachycardic. Still exam was cool. Patient was sitting comfortably no abnormal findings. Lab work was obtained which demonstrates no acute kidney injury or lateral maladies. Patient's troponin was within normal limits. EKG demonstrated a atrial fibrillation which is patient's baseline. Chest x-ray demonstrates chronic changes with no acute abnormalities appreciated. I discussed with the patient that given that his symptoms only occur at night and that he is able to exercise on a difficulty reduces my suspicion for cardiac etiology of his symptoms. Regards to possible coagulopathy patient is currently on Eliquis daily and also does not report any symptoms with exertion. Given the patient is alternating over-the-counter up to this point will recommend that he try short course of Atarax as well as over-the-counter melatonin tablets as a sleep aid.   Discussed comfort in fact that he has good follow-up with his cardiologist as well as the medical provider. Encourage patient to reach out to his primary care provider upon discharge to attend appointment for further reevaluation following the trial of the Atarax for anxiety and melatonin for sleep disturbances. Given patient's history of discussed with him that his risk of cardiac disease is always present and that if he would experience any worsening chest pain with exertion or difficulty breathing he should return emergency department medially for reevaluation. Patient was discharged home in stable condition all questions were answered. DISPOSITION  Patient was discharged to home in good condition. CLINICAL IMPRESSION  1. Sleep disturbances    2.  Anxiety state           Brissa Daly PA-C  06/16/21 2037

## 2021-06-24 ENCOUNTER — OFFICE VISIT (OUTPATIENT)
Dept: FAMILY MEDICINE CLINIC | Age: 66
End: 2021-06-24
Payer: MEDICARE

## 2021-06-24 VITALS
BODY MASS INDEX: 42.37 KG/M2 | OXYGEN SATURATION: 96 % | WEIGHT: 315 LBS | HEART RATE: 93 BPM | SYSTOLIC BLOOD PRESSURE: 108 MMHG | DIASTOLIC BLOOD PRESSURE: 70 MMHG

## 2021-06-24 DIAGNOSIS — G47.00 INSOMNIA, UNSPECIFIED TYPE: ICD-10-CM

## 2021-06-24 DIAGNOSIS — F41.9 ANXIETY: Primary | ICD-10-CM

## 2021-06-24 DIAGNOSIS — R06.02 SHORTNESS OF BREATH: ICD-10-CM

## 2021-06-24 PROCEDURE — 3017F COLORECTAL CA SCREEN DOC REV: CPT | Performed by: FAMILY MEDICINE

## 2021-06-24 PROCEDURE — 4040F PNEUMOC VAC/ADMIN/RCVD: CPT | Performed by: FAMILY MEDICINE

## 2021-06-24 PROCEDURE — G8428 CUR MEDS NOT DOCUMENT: HCPCS | Performed by: FAMILY MEDICINE

## 2021-06-24 PROCEDURE — G8417 CALC BMI ABV UP PARAM F/U: HCPCS | Performed by: FAMILY MEDICINE

## 2021-06-24 PROCEDURE — 1036F TOBACCO NON-USER: CPT | Performed by: FAMILY MEDICINE

## 2021-06-24 PROCEDURE — 1111F DSCHRG MED/CURRENT MED MERGE: CPT | Performed by: FAMILY MEDICINE

## 2021-06-24 PROCEDURE — 1123F ACP DISCUSS/DSCN MKR DOCD: CPT | Performed by: FAMILY MEDICINE

## 2021-06-24 PROCEDURE — 99214 OFFICE O/P EST MOD 30 MIN: CPT | Performed by: FAMILY MEDICINE

## 2021-06-24 RX ORDER — HYDROXYZINE HYDROCHLORIDE 25 MG/1
25 TABLET, FILM COATED ORAL EVERY 8 HOURS PRN
Qty: 30 TABLET | Refills: 2 | Status: SHIPPED | OUTPATIENT
Start: 2021-06-24

## 2021-06-24 NOTE — PROGRESS NOTES
Post-Discharge Transitional Care Management Services or Hospital Follow Up      [address]   YOB: 1955    Date of Office Visit:  6/24/2021  Date of Hospital Admission: 6/14/21  Date of Hospital Discharge: 6/14/21  Risk of hospital readmission (high >=14%.  Medium >=10%) :No data recorded    Care management risk score Rising risk (score 2-5) and Complex Care (Scores >=6): 6     Non face to face  following discharge, date last encounter closed (first attempt may have been earlier): *No documented post hospital discharge outreach found in the last 14 days    Call initiated 2 business days of discharge: *No response recorded in the last 14 days    Patient Active Problem List   Diagnosis    Essential hypertension    [facility]standing persistent [facility] ([facility]    Hyperlipidemia    CKD (chronic kidney disease) stage 3, GFR 30-59 ml/min    Chest pain    Obesity    Severe obstructive sleep apnea    Chronic diastolic congestive heart failure (HCC)    Atrial flutter (HCC)    Obesity, Class III, BMI 40-49.9 (morbid obesity) ([address]    Unstable angina ([address]    Nonischemic cardiomyopathy ([facility]    Polyp of ascending colon    Diverticulosis of colon       No Known Allergies    Medications listed as ordered at the time of discharge from hospital   [place] Medication Instructions ERICK:    Printed on:06/24/21 1532   Medication Information                      azithromycin (ZITHROMAX) 250 MG tablet  Take 2 tabs (500 mg) on Day 1, and take 1 tab (250 mg) on days 2 through 5.             eplerenone (INSPRA) 25 MG tablet  TAKE 1 TABLET BY MOUTH EVERY DAY             fluticasone (FLONASE) 50 MCG/ACT nasal spray  2 sprays by Each Nostril route daily             furosemide (LASIX) 40 MG tablet  TAKE 1 TABLET BY MOUTH EVERY DAY IN THE MORNING             hydrALAZINE (APRESOLINE) 25 MG tablet  Take 1 tablet by mouth 2 times daily             hydrOXYzine (ATARAX) 25 MG tablet  Take 1 tablet by mouth every 8 hours as needed for Anxiety             isosorbide mononitrate (IMDUR) 30 MG extended release tablet  TAKE 1 TABLET BY MOUTH  EVERY DAY             lisinopril (PRINIVIL;ZESTRIL) 20 MG tablet  TAKE 1 TABLET BY MOUTH TWO  TIMES DAILY             melatonin (RA MELATONIN) 3 MG TABS tablet  Take 1.5 tablets by mouth daily             metoprolol succinate (TOPROL XL) 50 MG extended release tablet  Take 1 tablet by mouth 2 times daily             pravastatin (PRAVACHOL) 40 MG tablet  TAKE 1 TABLET BY MOUTH  DAILY             rivaroxaban (XARELTO) 20 MG TABS tablet  TAKE 1 TABLET BY MOUTH EVERY DAY                   Medications marked \"taking\" at this time  Outpatient Medications Marked as Taking for the 6/24/21 encounter (Office Visit) with Isauro Jonas, DO   Medication Sig Dispense Refill    hydrOXYzine (ATARAX) 25 MG tablet Take 1 tablet by mouth every 8 hours as needed for Anxiety 30 tablet 2        Medications patient taking as of now reconciled against medications ordered at time of hospital discharge: Yes    Chief Complaint   Patient presents with    [facility] from [facility] and could not sleep. History of Present illness - Follow up of Hospital diagnosis(es):     Crystal Youssef is a 74 y.o. male history of CKD, A. fib; anticoagulated; JESSICA; presents emergency department for evaluation of shortness of breath and anxiety has been ongoing for the past 2 months. Patient states that since he received his first of the vaccination he has had recurrent episodes of anxiety and shortness of breath that occur at night upon trying to go to sleep. Patient states that over this time he has been exercising frequently without any difficulty breathing or chest pain. Patient states he lost roughly 15 pounds. Patient states that when he exercises he has no chest pain, shortness of breath according breathing.   Patient that his symptoms from occur upon trying to go to sleep when he has a sensation of anxiety as well as a rapid breathing and shortness of breath sensation that he says originates from his abdomen. Patient states that this is caused him to lose many hours of sleep. Patient does elicit that he experienced large amount of anxiety and consideration prior to receiving his first vaccination shot. To alleviate his symptoms he states he can set up but it paces around the house. Patient has not tried any over-the-counter medications. Patient also reports that he has been working on a turn for his  wife which could also provide source of his anxiety. Inpatient course: Discharge summary reviewed- see chart. Interval history/Current status: stable    A comprehensive review of systems was negative except for what was noted in the HPI. Vitals:    21 1359   BP: 108/70   Site: Right Upper Arm   Position: Sitting   Cuff Size: Large Adult   Pulse: 93   SpO2: 96%   Weight: (!) 330 lb (149.7 kg)     Body mass index is 42.37 kg/m².    Wt Readings from Last 3 Encounters:   21 (!) 330 lb (149.7 kg)   21 (!) 330 lb (149.7 kg)   21 (!) 346 lb (156.9 kg)     BP Readings from Last 3 Encounters:   21 108/70   21 (!) 123/98   21 (!) 156/93        Physical Exam:  General Appearance: alert and oriented to person, place and time, well developed and well- nourished, in no acute distress  Skin: warm and dry, no rash or erythema  Head: normocephalic and atraumatic  Neck: supple and non-tender without mass, no thyromegaly or thyroid nodules, no cervical lymphadenopathy  Pulmonary/Chest: clear to auscultation bilaterally- no wheezes, rales or rhonchi, normal air movement, no respiratory distress  Cardiovascular: normal rate, regular rhythm, normal S1 and S2, no murmurs, rubs, clicks, or gallops, distal pulses intact, no carotid bruits  Musculoskeletal: normal range of motion, no joint swelling, deformity or tenderness  Neurologic: reflexes normal and symmetric, no cranial nerve deficit, gait, coordination and speech normal    Assessment/Plan:  1. Anxiety  His symptoms seem more related to anxiety. He is doing better with the Atarax taken as needed. No need for an SSRI at this time. - hydrOXYzine (ATARAX) 25 MG tablet; Take 1 tablet by mouth every 8 hours as needed for Anxiety  Dispense: 30 tablet; Refill: 2  - [place] DISCHARGE MEDS RECONCILED W/ CURRENT OUTPATIENT MED LIST    2. Insomnia, unspecified type  Improving with melatonin. Continue to use CPAP at night. - [place] DISCHARGE MEDS RECONCILED W/ CURRENT OUTPATIENT MED LIST    3. Shortness of breath  Resolved. Related to anxiety. - [place] DISCHARGE MEDS RECONCILED W/ CURRENT OUTPATIENT MED LIST        Medical Decision Making: moderate complexity    I reviewed ER records including progress notes, labs and imaging.

## 2021-07-06 LAB — PSA, TOTAL: 3.3 NG/ML (ref 0–4)

## 2021-07-07 RX ORDER — HYDRALAZINE HYDROCHLORIDE 25 MG/1
TABLET, FILM COATED ORAL
Qty: 180 TABLET | Refills: 3 | Status: SHIPPED | OUTPATIENT
Start: 2021-07-07 | End: 2021-11-29 | Stop reason: SDUPTHER

## 2021-07-07 NOTE — TELEPHONE ENCOUNTER
Last OV: 12/2/20  Next OV: 11/29/21  Most recent Labs: 6/14/21  Last PT/INR (if needed):  Last EKG (if needed):

## 2021-07-09 ENCOUNTER — VIRTUAL VISIT (OUTPATIENT)
Dept: PULMONOLOGY | Age: 66
End: 2021-07-09
Payer: MEDICARE

## 2021-07-09 ENCOUNTER — TELEPHONE (OUTPATIENT)
Dept: PULMONOLOGY | Age: 66
End: 2021-07-09

## 2021-07-09 DIAGNOSIS — E66.01 OBESITY, CLASS III, BMI 40-49.9 (MORBID OBESITY) (HCC): ICD-10-CM

## 2021-07-09 DIAGNOSIS — G47.33 SEVERE OBSTRUCTIVE SLEEP APNEA: Primary | ICD-10-CM

## 2021-07-09 DIAGNOSIS — Z71.89 CPAP USE COUNSELING: ICD-10-CM

## 2021-07-09 DIAGNOSIS — Z72.821 POOR SLEEP HYGIENE: ICD-10-CM

## 2021-07-09 DIAGNOSIS — F51.04 PSYCHOPHYSIOLOGICAL INSOMNIA: ICD-10-CM

## 2021-07-09 PROCEDURE — 1123F ACP DISCUSS/DSCN MKR DOCD: CPT | Performed by: NURSE PRACTITIONER

## 2021-07-09 PROCEDURE — G8427 DOCREV CUR MEDS BY ELIG CLIN: HCPCS | Performed by: NURSE PRACTITIONER

## 2021-07-09 PROCEDURE — 4040F PNEUMOC VAC/ADMIN/RCVD: CPT | Performed by: NURSE PRACTITIONER

## 2021-07-09 PROCEDURE — 99214 OFFICE O/P EST MOD 30 MIN: CPT | Performed by: NURSE PRACTITIONER

## 2021-07-09 PROCEDURE — 3017F COLORECTAL CA SCREEN DOC REV: CPT | Performed by: NURSE PRACTITIONER

## 2021-07-09 ASSESSMENT — SLEEP AND FATIGUE QUESTIONNAIRES
HOW LIKELY ARE YOU TO NOD OFF OR FALL ASLEEP WHILE WATCHING TV: 2
HOW LIKELY ARE YOU TO NOD OFF OR FALL ASLEEP WHILE SITTING INACTIVE IN A PUBLIC PLACE: 2
HOW LIKELY ARE YOU TO NOD OFF OR FALL ASLEEP WHEN YOU ARE A PASSENGER IN A CAR FOR AN HOUR WITHOUT A BREAK: 1
HOW LIKELY ARE YOU TO NOD OFF OR FALL ASLEEP WHILE SITTING AND READING: 2
HOW LIKELY ARE YOU TO NOD OFF OR FALL ASLEEP WHILE WATCHING TV: 2
HOW LIKELY ARE YOU TO NOD OFF OR FALL ASLEEP WHILE LYING DOWN TO REST IN THE AFTERNOON WHEN CIRCUMSTANCES PERMIT: 2
ESS TOTAL SCORE: 10
HOW LIKELY ARE YOU TO NOD OFF OR FALL ASLEEP WHEN YOU ARE A PASSENGER IN A CAR FOR AN HOUR WITHOUT A BREAK: 1
HOW LIKELY ARE YOU TO NOD OFF OR FALL ASLEEP WHILE SITTING AND TALKING TO SOMEONE: 0
HOW LIKELY ARE YOU TO NOD OFF OR FALL ASLEEP IN A CAR, WHILE STOPPED FOR A FEW MINUTES IN TRAFFIC: 0
HOW LIKELY ARE YOU TO NOD OFF OR FALL ASLEEP WHILE LYING DOWN TO REST IN THE AFTERNOON WHEN CIRCUMSTANCES PERMIT: 2
HOW LIKELY ARE YOU TO NOD OFF OR FALL ASLEEP WHILE SITTING QUIETLY AFTER LUNCH WITHOUT ALCOHOL: 1
HOW LIKELY ARE YOU TO NOD OFF OR FALL ASLEEP WHILE SITTING AND READING: 2
HOW LIKELY ARE YOU TO NOD OFF OR FALL ASLEEP WHILE SITTING QUIETLY AFTER LUNCH WITHOUT ALCOHOL: 1
HOW LIKELY ARE YOU TO NOD OFF OR FALL ASLEEP WHILE SITTING INACTIVE IN A PUBLIC PLACE: 0
HOW LIKELY ARE YOU TO NOD OFF OR FALL ASLEEP WHILE SITTING AND TALKING TO SOMEONE: 0
HOW LIKELY ARE YOU TO NOD OFF OR FALL ASLEEP IN A CAR, WHILE STOPPED FOR A FEW MINUTES IN TRAFFIC: 0
ESS TOTAL SCORE: 8

## 2021-07-09 NOTE — PROGRESS NOTES
 CARDIOVERSION  01/10/2017    A Fib- SR    COLONOSCOPY N/A 1/30/2020    COLONOSCOPY W/ ANESTHESIA -SLEEP APNEA- performed by Delvis Hart MD at [place] [address]  1/30/2020    COLONOSCOPY WITH BIOPSY performed by Delvis Hart MD at [place] [address] N/A 1/30/2020    COLONOSCOPY POLYPECTOMY SNARE/COLD BIOPSY performed by Delvis Hart MD at [address] [place] [address]      umbilical    KNEE ARTHROSCOPY Left 03/20/2012    Left    TRANSESOPHAGEAL ECHOCARDIOGRAM  8/11/2015    TRANSESOPHAGEAL ECHOCARDIOGRAM  01/10/2017       Current Medications:    Current Outpatient Medications:     hydrALAZINE (APRESOLINE) 25 MG tablet, TAKE 1 TABLET BY MOUTH TWICE A DAY, Disp: 180 tablet, Rfl: 3    hydrOXYzine (ATARAX) 25 MG tablet, Take 1 tablet by mouth every 8 hours as needed for Anxiety, Disp: 30 tablet, Rfl: 2    melatonin (RA MELATONIN) 3 MG TABS tablet, Take 1.5 tablets by mouth daily, Disp: 60 tablet, Rfl: 3    fluticasone (FLONASE) 50 MCG/ACT nasal spray, 2 sprays by Each Nostril route daily, Disp: 1 Bottle, Rfl: 5    furosemide (LASIX) 40 MG tablet, TAKE 1 TABLET BY MOUTH EVERY DAY IN THE MORNING, Disp: 90 tablet, Rfl: 3    eplerenone (INSPRA) 25 MG tablet, TAKE 1 TABLET BY MOUTH EVERY DAY, Disp: 90 tablet, Rfl: 1    rivaroxaban (XARELTO) 20 MG TABS tablet, TAKE 1 TABLET BY MOUTH EVERY DAY, Disp: 90 tablet, Rfl: 3    pravastatin (PRAVACHOL) 40 MG tablet, TAKE 1 TABLET BY MOUTH  DAILY, Disp: 90 tablet, Rfl: 3    isosorbide mononitrate (IMDUR) 30 MG extended release tablet, TAKE 1 TABLET BY MOUTH  EVERY DAY, Disp: 90 tablet, Rfl: 3    lisinopril (PRINIVIL;ZESTRIL) 20 MG tablet, TAKE 1 TABLET BY MOUTH TWO  TIMES DAILY, Disp: 180 tablet, Rfl: 3    metoprolol succinate (TOPROL XL) 50 MG extended release tablet, Take 1 tablet by mouth 2 times daily, Disp: 180 tablet, Rfl: 3    azithromycin (ZITHROMAX) 250 MG tablet, Take 2 tabs (500 mg) JESSICA.  CPAP 13 cm H2O. Nasal mask. Compliant per patient, symptoms appear well controlled  · Sleep onset and maintenance insomniapsychophysiological hyperarousal, poor sleep hygiene, anxiety likely contributing. Improving with melatonin and hydroxyzine. ·  Atrial fibrillation- ablation 2015  · HTN   · Obesity      Plan:      - Order new CPAP 13 cm H2O as patient has older, outdated, obsolete unit with no modem.  -Request current download report from DME  - Advised to use CPAP 6-8 hrs at night and during naps. - Replacement of mask, tubing, head straps every 3-6 months or sooner if damaged. - Patient instructed to contact [employer] company for any mask, tubing or machine trouble shooting if problems arise.  - Sleep hygiene  -Cognitive behavioral therapy was discussed with patient including stimulus control and sleep restriction. Recommend set bed/wake times, decrease time in bed, no naps in the daytime, decrease caffeine intake especially after noon to 1 PM. Continue medications per PCP  - Avoid sedatives, alcohol and caffeinated drinks at bed time.  - No driving motorized vehicles or operating heavy machinery while fatigue, drowsy or sleepy. - Weight loss is also recommended as a long-term intervention.   - Complications of JESSICA if not treated were discussed with patient patient to include systemic hypertension, pulmonary hypertension, cardiovascular morbidities, car accidents and all cause mortality.  -Patient education regarding sleep tips and CPAP cleaning recommendations    -Discussed [employer] recently recalled some Pap units. Patient encouraged to call DME to see if his unit is on recall and register unit if so  -At this time, if patients have moderate to severe sleep apnea or have severe daytime sleepiness, it is recommended continue therapy until the machine can be replaced.   Based upon the information we have so far, it appears the risk of stopping therapy may be higher than the risks associated with foam degradation. However, there are still many unknown variables and each patient will have to make a final determination on his or her own. Patient states he plans to continue to use his CPAP  -Please only use cleaning methods recommended by . Follow up 1 year sooner if needed    Consent for telehealth visit was obtained and is noted in chart      [address] Aebbe Chavez is a 74 y.o. male being evaluated by a Virtual Visit (video visit) encounter to address concerns as mentioned above. A caregiver was present when appropriate. Due to this being a TeleHealth encounter (During RXNET-32 public health emergency), evaluation of the following organ systems was limited: Vitals/Constitutional/EENT/Resp/CV/GI//MS/Neuro/Skin/Heme-Lymph-Imm. Pursuant to the emergency declaration under the [address] [place] [address], [address] [place] [address] authority and the [employer] Act, this Virtual Visit was conducted with patient's (and/or legal guardian's) consent, to reduce the patient's risk of exposure to COVID-19 and provide necessary medical care. The patient (and/or legal guardian) has also been advised to contact this office for worsening conditions or problems, and seek emergency medical treatment and/or call 911 if deemed necessary. Patient identification was verified at the start of the visit: Yes    Total time spent for this encounter: Not billed by time    Services were provided through a video synchronous discussion virtually to substitute for in-person clinic visit. Patient and provider were located at their individual homes. --WESTON Christina CNP on 7/9/2021 at 9:39 AM    An electronic signature was used to authenticate this note.

## 2021-07-09 NOTE — PATIENT INSTRUCTIONS

## 2021-07-16 NOTE — TELEPHONE ENCOUNTER
Called and spoke to [facility] and she said that the patient has not brought machine in for download.  [facility] to call patient

## 2021-07-21 NOTE — TELEPHONE ENCOUNTER
Called and spoke to Carol at [facility]. Patient did bring machine in for download, she will fax compliance to office.

## 2021-07-26 NOTE — TELEPHONE ENCOUNTER
CPAP download report from 6/19/2021-7/18/2021 on CPAP 13 cm H2O reviewed. Compliance is good 100%. AHI is good 1.5.

## 2021-08-13 RX ORDER — METOPROLOL SUCCINATE 50 MG/1
TABLET, EXTENDED RELEASE ORAL
Qty: 180 TABLET | Refills: 3 | Status: SHIPPED | OUTPATIENT
Start: 2021-08-13 | End: 2021-11-29 | Stop reason: SDUPTHER

## 2021-08-25 ENCOUNTER — TELEPHONE (OUTPATIENT)
Dept: PULMONOLOGY | Age: 66
End: 2021-08-25

## 2021-08-25 NOTE — TELEPHONE ENCOUNTER
Patient cancelled appointment on 9/27/21 with Umu Alexandra CNP for 31-90. Reason: pt has not been set up with machine yet due to machines being on back order. Patient did not reschedule appointment. Appointment rescheduled for . VV 7/9/21:      Assessment:   · Severe JESSICA. CPAP 13 cm H2O. Nasal mask. Compliant per patient, symptoms appear well controlled  · Sleep onset and maintenance insomnia-psychophysiological hyperarousal, poor sleep hygiene, anxiety likely contributing. Improving with melatonin and hydroxyzine. ·  Atrial fibrillation- ablation 2015  · HTN   · Obesity                Plan:   - Order new CPAP 13 cm H2O as patient has older, outdated, obsolete unit with no modem.  -Request current download report from DME  - Advised to use CPAP 6-8 hrs at night and during naps. - Replacement of mask, tubing, head straps every 3-6 months or sooner if damaged. - Patient instructed to contact DME company for any mask, tubing or machine trouble shooting if problems arise.  - Sleep hygiene  -Cognitive behavioral therapy was discussed with patient including stimulus control and sleep restriction. Recommend set bed/wake times, decrease time in bed, no naps in the daytime, decrease caffeine intake especially after noon to 1 PM. Continue medications per PCP  - Avoid sedatives, alcohol and caffeinated drinks at bed time.  - No driving motorized vehicles or operating heavy machinery while fatigue, drowsy or sleepy.    - Weight loss is also recommended as a long-term intervention.   - Complications of JESSICA if not treated were discussed with patient patient to include systemic hypertension, pulmonary hypertension, cardiovascular morbidities, car accidents and all cause mortality.  -Patient education regarding sleep tips and CPAP cleaning recommendations

## 2021-09-10 NOTE — TELEPHONE ENCOUNTER
Spoke with Cheng at [facility], whom states that patient does not qualify for a new CPAP machine, because his current unit still works.

## 2021-09-13 NOTE — TELEPHONE ENCOUNTER
This has not been the case previously. I had previously been told that they could no longer get replacement parts for system 1 units.

## 2021-09-13 NOTE — TELEPHONE ENCOUNTER
Spoke to patient and he said that he would like to wait a few weeks to see if he gets a new machine from [facility].  I will call patient in three weeks

## 2021-09-13 NOTE — TELEPHONE ENCOUNTER
Per Cheng at [facility] they called patient and he said that his machine works fine and that there is nothing wrong with it. I called and spoke to [facility] and she said that it would have to be \"broken beyond repair\" for insurance to cover new machine. They are still able to get a download off of the patients machine.

## 2021-10-01 RX ORDER — LISINOPRIL 20 MG/1
TABLET ORAL
Qty: 180 TABLET | Refills: 0 | Status: SHIPPED | OUTPATIENT
Start: 2021-10-01 | End: 2021-11-29 | Stop reason: SDUPTHER

## 2021-10-01 RX ORDER — PRAVASTATIN SODIUM 40 MG
TABLET ORAL
Qty: 90 TABLET | Refills: 0 | Status: SHIPPED | OUTPATIENT
Start: 2021-10-01 | End: 2021-11-29 | Stop reason: SDUPTHER

## 2021-10-04 RX ORDER — ISOSORBIDE MONONITRATE 30 MG/1
TABLET, EXTENDED RELEASE ORAL
Qty: 90 TABLET | Refills: 1 | Status: SHIPPED | OUTPATIENT
Start: 2021-10-04 | End: 2021-11-29 | Stop reason: SDUPTHER

## 2021-10-14 RX ORDER — EPLERENONE 25 MG/1
TABLET, FILM COATED ORAL
Qty: 30 TABLET | Refills: 5 | Status: SHIPPED | OUTPATIENT
Start: 2021-10-14 | End: 2022-02-10

## 2021-11-03 NOTE — TELEPHONE ENCOUNTER
Called and spoke to patient, he has not rec'd new machine through [facility]. Patient is going to call and check with his insurance if he is eligible for a new machine.

## 2021-11-04 NOTE — TELEPHONE ENCOUNTER
[facility] called he cannot get new machine through them would be through [facility] he -can call [phone] to check on status.

## 2021-11-10 NOTE — TELEPHONE ENCOUNTER
Called and spoke to patient and scheduled him for a 1 year sleep appt. Patient to call if he needs anything from our office.

## 2021-11-29 ENCOUNTER — OFFICE VISIT (OUTPATIENT)
Dept: CARDIOLOGY CLINIC | Age: 66
End: 2021-11-29
Payer: MEDICARE

## 2021-11-29 VITALS
OXYGEN SATURATION: 98 % | BODY MASS INDEX: 40.43 KG/M2 | HEART RATE: 67 BPM | SYSTOLIC BLOOD PRESSURE: 118 MMHG | HEIGHT: 74 IN | WEIGHT: 315 LBS | DIASTOLIC BLOOD PRESSURE: 68 MMHG

## 2021-11-29 DIAGNOSIS — I48.92 ATRIAL FLUTTER, UNSPECIFIED TYPE (HCC): ICD-10-CM

## 2021-11-29 DIAGNOSIS — I10 ESSENTIAL HYPERTENSION: ICD-10-CM

## 2021-11-29 DIAGNOSIS — I50.32 CHRONIC DIASTOLIC CONGESTIVE HEART FAILURE (HCC): ICD-10-CM

## 2021-11-29 DIAGNOSIS — E78.2 MIXED HYPERLIPIDEMIA: ICD-10-CM

## 2021-11-29 DIAGNOSIS — I48.11 LONGSTANDING PERSISTENT ATRIAL FIBRILLATION (HCC): Primary | ICD-10-CM

## 2021-11-29 DIAGNOSIS — I42.8 NONISCHEMIC CARDIOMYOPATHY (HCC): ICD-10-CM

## 2021-11-29 PROCEDURE — 1036F TOBACCO NON-USER: CPT | Performed by: INTERNAL MEDICINE

## 2021-11-29 PROCEDURE — 4040F PNEUMOC VAC/ADMIN/RCVD: CPT | Performed by: INTERNAL MEDICINE

## 2021-11-29 PROCEDURE — G8427 DOCREV CUR MEDS BY ELIG CLIN: HCPCS | Performed by: INTERNAL MEDICINE

## 2021-11-29 PROCEDURE — G8417 CALC BMI ABV UP PARAM F/U: HCPCS | Performed by: INTERNAL MEDICINE

## 2021-11-29 PROCEDURE — 1123F ACP DISCUSS/DSCN MKR DOCD: CPT | Performed by: INTERNAL MEDICINE

## 2021-11-29 PROCEDURE — G8484 FLU IMMUNIZE NO ADMIN: HCPCS | Performed by: INTERNAL MEDICINE

## 2021-11-29 PROCEDURE — 99214 OFFICE O/P EST MOD 30 MIN: CPT | Performed by: INTERNAL MEDICINE

## 2021-11-29 PROCEDURE — 3017F COLORECTAL CA SCREEN DOC REV: CPT | Performed by: INTERNAL MEDICINE

## 2021-11-29 RX ORDER — ISOSORBIDE MONONITRATE 30 MG/1
TABLET, EXTENDED RELEASE ORAL
Qty: 90 TABLET | Refills: 3 | Status: SHIPPED | OUTPATIENT
Start: 2021-11-29

## 2021-11-29 RX ORDER — FUROSEMIDE 40 MG/1
TABLET ORAL
Qty: 90 TABLET | Refills: 3 | Status: SHIPPED | OUTPATIENT
Start: 2021-11-29 | End: 2022-05-24 | Stop reason: SDUPTHER

## 2021-11-29 RX ORDER — METOPROLOL SUCCINATE 50 MG/1
TABLET, EXTENDED RELEASE ORAL
Qty: 180 TABLET | Refills: 3 | Status: SHIPPED | OUTPATIENT
Start: 2021-11-29

## 2021-11-29 RX ORDER — PRAVASTATIN SODIUM 40 MG
TABLET ORAL
Qty: 90 TABLET | Refills: 3 | Status: SHIPPED | OUTPATIENT
Start: 2021-11-29

## 2021-11-29 RX ORDER — HYDRALAZINE HYDROCHLORIDE 25 MG/1
TABLET, FILM COATED ORAL
Qty: 180 TABLET | Refills: 3 | Status: SHIPPED | OUTPATIENT
Start: 2021-11-29

## 2021-11-29 RX ORDER — LISINOPRIL 20 MG/1
TABLET ORAL
Qty: 180 TABLET | Refills: 3 | Status: SHIPPED | OUTPATIENT
Start: 2021-11-29

## 2021-11-29 NOTE — PROGRESS NOTES
[address] Office Note  11/29/2021     Subjective:  Keesha Ortiz is a 74 y.o. male who I here for follow up for persistent atrial fib, non ischemic cardiomyopathy, tachycardia induced,   diastolic  CHF, hypertension, and hyperlipidemia. Obesity JESSICA- CPAP  Wife passed in 2016. He has retired from [employer] march 6,2020. [place]: Today, patient reports that he has no cardiac complaints. He complains of arthritic pains in his knees. He had an umbilical hernia repaired several years ago, but thinks this has returned. He tries to stay away from crowds due to the Pandemic. He reports his CPAP was recently recalled. He is in the process of having this replaced. BP log WNL. Patient is taking all cardiac medications as prescribed and tolerates them well. Patient denies current edema, chest pain, sob, palpitations, dizziness or syncope. PMH:   Keesha Ortiz  has known history of remote atrial fibrillation in 2008. He was treated with amiodarone and has remained in sinus rhythm. he presented to the hospital on 02/24/15 with experienced profuse diaphoresis. He felt somepressure in his chest, which was substernal.    In the emergency room, the patient was found to be in atrial fibrillation, but the rate is controlled. Echo 02/24/15 EF 55%. Moderate concentric left ventricular hypertrophy is present. NM 03/05/15 showed decreased activity seen at the anteroseptal region at both stress and rest, though this decrease is more noticeable at stress than rest. A small zone reversibility. He underwent successful DCCV on 3/20/15 after 3 days he feels he went back in to AFIB. He could tell by his monitor and the way he felt. He was seen by Dr Zoey Wen and underwent successful RFCA on 8/11/15 with Dr Zoey Wen. He had recurrent a fib in 2016 and amiodarone was restarted. He underwent another cardioversion in 2017. His amiodarone was decreased due to episodes of sinus bradycardia.  He started following with nephrology in 2/2019 due to CKD. In 7/2019 he went to the ER with fast heart rates, found to be in a fib, rate 116. He underwent a left heart cath in 7/2019 due to chest pain. He had normal coronaries and NICM. He underwent another cardioversion in 8/2019. An echocardiogram from 10/2019 showed an EF of 55%. He had a RFCA for PAF in 11/2019. Review of Systems:  12 point ROS negative in all areas as listed below except as in [place]  Constitutional, EENT, pulmonary, GI, , Musculoskeletal, skin, neurological, hematological, endocrine, Psychiatric  Reviewed past medical history, social, and family history. 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 His wife passed away in 2016.    Family hx: Dad no heart dz    Mom + heart dz  Past Medical History:   Diagnosis Date    [facility] fibrillation [facility]     Cardiomyopathy ([facility] 07/2019    EF= 35-40%    Chest pain 07/2019    CH[facility]congestive [facility] failure) [facility]     CKD (chronic kidney disease) stage 3, GFR 30-59 ml/min ([facility])     has only one functioning kidney    Family history of early CAD     Hyperlipidemia     Hypertension     JESSICA (obstructive sleep apnea)     uses CPAP    S/P ablation of atrial fibrillation 8/11/2015,11-25-19    RFCA     Past Surgical History:   Procedure Laterality Date    ABLATION OF DYSRHYTHMIC FOCUS  08/17/2015    CHARLY/ Atrial Fib Ablation    ABLATION OF DYSRHYTHMIC FOCUS  11/25/2019    for Atrial Fib    BACK SURGERY      CARDIAC CATHETERIZATION  07/25/2019    Non- Ischemic Cardiomyopathy    CARDIOVERSION  03/23/2015    Atrial Fib to SR    CARDIOVERSION  01/10/2017    A Fib- SR    COLONOSCOPY N/A 1/30/2020    COLONOSCOPY W/ ANESTHESIA -SLEEP APNEA- performed by Mario Chris MD at [address]  1/30/2020    COLONOSCOPY WITH BIOPSY performed by Mario Chris MD at [address] N/A 1/30/2020    COLONOSCOPY POLYPECTOMY SNARE/COLD BIOPSY performed by Bj Brown MD at [address] [place], [place] [address]way      umbilical    KNEE ARTHROSCOPY Left 03/20/2012    Left    TRANSESOPHAGEAL ECHOCARDIOGRAM  8/11/2015    TRANSESOPHAGEAL ECHOCARDIOGRAM  01/10/2017       Objective:   /68   Pulse 67   Ht 6' 2\" (1.88 m)   Wt (!) 315 lb 8 oz (143.1 kg)   SpO2 98%   BMI 40.51 kg/m²     Wt Readings from Last 3 Encounters:   11/29/21 (!) 315 lb 8 oz (143.1 kg)   08/09/21 (!) 330 lb (149.7 kg)   06/24/21 (!) 330 lb (149.7 kg)       Physical Exam:  General: No Respiratory distress, appears well developed and well nourished. Eyes:  Sclera nonicteric  Nose/Sinuses:  negative findings: nose shows no deformity, asymmetry, or inflammation, nasal mucosa normal, septum midline with no perforation or bleeding  Back:  no pain to palpation  Joint:  no active joint inflammation  Musculoskeletal:  negative  Skin:  Warm and dry  Neck:  Negative for JVD and Carotid Bruits. Chest:  Clear to auscultation, respiration easy  Cardiovascular: irreg irreg 80/min   S2 normal, no murmur, no rub or thrill.   Abdomen:  Obese, Soft normal liver and spleen  Extremities:   No edema, no clubbing, no cyanosis,  Pulses: pedal pulses are normal.  Neuro: intact    Medications:   Outpatient Encounter Medications as of 11/29/2021   Medication Sig Dispense Refill    eplerenone (INSPRA) 25 MG tablet TAKE 1 TABLET BY MOUTH EVERY DAY 30 tablet 5    isosorbide mononitrate (IMDUR) 30 MG extended release tablet TAKE 1 TABLET BY MOUTH EVERY DAY 90 tablet 1    pravastatin (PRAVACHOL) 40 MG tablet TAKE 1 TABLET BY MOUTH EVERY DAY 90 tablet 0    lisinopril (PRINIVIL;ZESTRIL) 20 MG tablet TAKE 1 TABLET BY MOUTH TWICE A  tablet 0    metoprolol succinate (TOPROL XL) 50 MG extended release tablet TAKE 1 TABLET BY MOUTH TWICE A  tablet 3    hydrALAZINE (APRESOLINE) 25 MG tablet TAKE 1 TABLET BY MOUTH TWICE A  tablet 3    hydrOXYzine (ATARAX) 25 MG tablet Take 1 tablet by mouth EKG 6/2/2020  Atrial fibrillation   -  Nonspecific T-abnormality. 92 bpm    Limited echo 26/2/8560:  Normal systolic function with an estimated ejection fraction of 55%. Upper limits of normal left ventricle size. There is mild concentric left ventricular hypertrophy. No regional wall motion abnormalities are seen. Normal left ventricular diastolic filling pressure. The left atrium is mildly dilated. The right atrium is moderately dilated. Cath LV gram on 4/2019 showed EF of 40%. [facility] 7/25/2019 Rebecca Howard):  Procedures Performed:   1. Left heart catheterization  2. Selective left and right coronary angiogram  3. Left ventriculography     Procedure Findings:  1. Normal coronary angiogram  2. Reduced left ventricular function with EF estimated at 35-40%  3. Normal left heart hemodynamics       CHARLY 1/10/2017:  Ejection fraction is visually estimated to be 45-50 %.   Trivial mitral and tricuspid regurgitation.   No HARPREET clot   Compared to previous study from 8- ejection fraction remains unchanged. PROCEDURE PERFORMED: 8/11/15  1. Complex electrophysiology study with attempted induction of arrhythmia. 2. Left atrial pacing mapping and recording via coronary sinus catheter. 3. Three-dimensional map of the left atrium with the NavX mapping system. 4. Transeptal  x2.   5. Intracardiac ICE catheter placement. 6. Radiofrequency catheter ablation of persistent atrial fibrillation. 7. Direct-current cardioversion x1. NM 03/05/15:  Findings: Decreased activity seen at the anteroseptal region at both stress and rest, though this decrease is more noticeable at stress than rest. A small zone reversibility is also detected in this region by the computer model. No large areas of reversibility are seen. Left ventricular ejection fraction is 43%. End diastolic volume is 579 mL. End systolic volume is 60 mL. Septal hypokinesis. Summary   Atrial fibrillation at baseline.    Normal response to Andrea Edwards. Echo: 02/24/15   Summary   Technically difficult study due to body habitus. Patient is in atrial   fibrillations with controlled ventricular response during this study. Overall left ventricular function is normal.   Ejection fraction is visually estimated to be 55 %. Left ventricle size is normal.   Moderate concentric left ventricular hypertrophy is present. Normal diastolic  function. Mitral valve is structurally normal.   Trivial mitral regurgitation is present. The left atrium is normal in size. The aortic valve is normal in structure and function. There is no   significant aortic regurgitation. The aortic root is enlarged ( 3.8cm) in size. IVC size is normal (<2.1 cm) and collapses > 50% with respiration   consistent with normal RA pressure (3 mmHg). No obvious masses, thrombi, or vegetations are noted. Assessment:  Encounter Diagnoses   Name Primary?  Essential hypertension     Longstanding persistent atrial fibrillation (HCC) Yes    Mixed hyperlipidemia     Chronic diastolic congestive heart failure (HCC)     Atrial flutter, unspecified type ([address]     Nonischemic cardiomyopathy ([address]        Diagnoses and associated orders for this visit:  1. Paroxysmal atrial fibrillation    -RFCA 8/2015, DCCV 1/10/17 and then again 8/2019, repeat RFCA 11/2019  2. Sinus bradycardia   3. Hypertension   4. Non ischemic cardiomyopathy    -EF 55% 10/2019  5. Chronic diastolic CHF  6. Chronic kidney disease    followed by Dr. Jarod Casiano  7. JESSICA   -on CPAP     Plan:  1. Medications reviewed. Medications refilled as warranted. 2. CBC, CMP, TSH, and fasting lipids (8 hours) ordered. Due 4/2021.   3. Follow up with me in 6 months      QUALITY MEASURES  1. Tobacco Cessation Counseling: NA  2. Retake of BP if >140/90:   NA  3. Documentation to PCP/referring for new patient:  Sent to PCP at close of office visit  4. CAD patient on anti-platelet NA anticoagulated  5.  CAD patient on STATIN therapy: Yes  6. Patient with CHF and aFib on anticoagulation:  Yes Xarelto     This note has been scribed in the presence of Racheal South MD, by Estela Alves RN.   I, Dr. Joan Stallworth, personally performed the services described in this documentation, as scribed by the above signed scribe in my presence. It is both accurate and complete to my knowledge. I agree with the details independently gathered by the clinical support staff, while the remaining scribed note accurately describes my personal service to the patient.         Racheal South MD  [facility]  [phone] [facility] office  [phone] [facility]

## 2021-11-29 NOTE — LETTER
November 29,2021  [address]  1955        [facility] Office Note  11/29/2021     Subjective:  [address] Ghazala is a 74 y.o. male who I here for follow up for persistent atrial fib, non ischemic cardiomyopathy, tachycardia induced,   diastolic  CHF, hypertension, and hyperlipidemia. Obesity JESSICA- CPAP  Wife passed in 2016. He has retired from [employer] march 6,2020. Tribal: Today, patient reports that he has no cardiac complaints. He complains of arthritic pains in his knees. He had an umbilical hernia repaired several years ago, but thinks this has returned. He tries to stay away from crowds due to the Pandemic. He reports his CPAP was recently recalled. He is in the process of having this replaced. BP log WNL. Patient is taking all cardiac medications as prescribed and tolerates them well. Patient denies current edema, chest pain, sob, palpitations, dizziness or syncope. PMH:   [address]  has known history of remote atrial fibrillation in 2008. He was treated with amiodarone and has remained in sinus rhythm. he presented to the hospital on 02/24/15 with experienced profuse diaphoresis. He felt somepressure in his chest, which was substernal.    In the emergency room, the patient was found to be in atrial fibrillation, but the rate is controlled. Echo 02/24/15 EF 55%. Moderate concentric left ventricular hypertrophy is present. NM 03/05/15 showed decreased activity seen at the anteroseptal region at both stress and rest, though this decrease is more noticeable at stress than rest. A small zone reversibility. He underwent successful DCCV on 3/20/15 after 3 days he feels he went back in to AFIB. He could tell by his monitor and the way he felt. He was seen by Dr Ignacio Vasquez and underwent successful RFCA on 8/11/15 with Dr Ignacio Vasquez. He had recurrent a fib in 2016 and amiodarone was restarted. He underwent another cardioversion in 2017.  His amiodarone was decreased due to episodes of sinus COLONOSCOPY POLYPECTOMY SNARE/COLD BIOPSY performed by Jd Combs MD at [address] [place], [place] [address]way      umbilical    KNEE ARTHROSCOPY Left 03/20/2012    Left    TRANSESOPHAGEAL ECHOCARDIOGRAM  8/11/2015    TRANSESOPHAGEAL ECHOCARDIOGRAM  01/10/2017       Objective:   /68   Pulse 67   Ht 6' 2\" (1.88 m)   Wt (!) 315 lb 8 oz (143.1 kg)   SpO2 98%   BMI 40.51 kg/m²     Wt Readings from Last 3 Encounters:   11/29/21 (!) 315 lb 8 oz (143.1 kg)   08/09/21 (!) 330 lb (149.7 kg)   06/24/21 (!) 330 lb (149.7 kg)       Physical Exam:  General: No Respiratory distress, appears well developed and well nourished. Eyes:  Sclera nonicteric  Nose/Sinuses:  negative findings: nose shows no deformity, asymmetry, or inflammation, nasal mucosa normal, septum midline with no perforation or bleeding  Back:  no pain to palpation  Joint:  no active joint inflammation  Musculoskeletal:  negative  Skin:  Warm and dry  Neck:  Negative for JVD and Carotid Bruits. Chest:  Clear to auscultation, respiration easy  Cardiovascular: irreg irreg 80/min   S2 normal, no murmur, no rub or thrill.   Abdomen:  Obese, Soft normal liver and spleen  Extremities:   No edema, no clubbing, no cyanosis,  Pulses: pedal pulses are normal.  Neuro: intact    Medications:   Outpatient Encounter Medications as of 11/29/2021   Medication Sig Dispense Refill    eplerenone (INSPRA) 25 MG tablet TAKE 1 TABLET BY MOUTH EVERY DAY 30 tablet 5    isosorbide mononitrate (IMDUR) 30 MG extended release tablet TAKE 1 TABLET BY MOUTH EVERY DAY 90 tablet 1    pravastatin (PRAVACHOL) 40 MG tablet TAKE 1 TABLET BY MOUTH EVERY DAY 90 tablet 0    lisinopril (PRINIVIL;ZESTRIL) 20 MG tablet TAKE 1 TABLET BY MOUTH TWICE A  tablet 0    metoprolol succinate (TOPROL XL) 50 MG extended release tablet TAKE 1 TABLET BY MOUTH TWICE A  tablet 3    hydrALAZINE (APRESOLINE) 25 MG tablet TAKE 1 TABLET BY MOUTH TWICE A  tablet 3    hydrOXYzine (ATARAX) 25 MG tablet Take 1 tablet by mouth every 8 hours as needed for Anxiety 30 tablet 2    fluticasone (FLONASE) 50 MCG/ACT nasal spray 2 sprays by Each Nostril route daily 1 Bottle 5    furosemide (LASIX) 40 MG tablet TAKE 1 TABLET BY MOUTH EVERY DAY IN THE MORNING 90 tablet 3    rivaroxaban (XARELTO) 20 MG TABS tablet TAKE 1 TABLET BY MOUTH EVERY DAY 90 tablet 3    melatonin (RA MELATONIN) 3 MG TABS tablet Take 1.5 tablets by mouth daily (Patient not taking: Reported on 11/29/2021) 60 tablet 3    azithromycin (ZITHROMAX) 250 MG tablet Take 2 tabs (500 mg) on Day 1, and take 1 tab (250 mg) on days 2 through 5. (Patient not taking: Reported on 7/9/2021) 1 packet 0     No facility-administered encounter medications on file as of 11/29/2021. Lab Data:  CBC: No results for input(s): WBC, HGB, HCT, MCV, PLT in the last 72 hours. BMP: No results for input(s): NA, K, CL, CO2, PHOS, BUN, CREATININE in the last 72 hours. Invalid input(s): CA  LIVER PROFILE: No results for input(s): AST, ALT, LIPASE, BILIDIR, BILITOT, ALKPHOS in the last 72 hours. Invalid input(s): AMYLASE,  ALB  LIPID:   No components found for: CHLPL  Lab Results   Component Value Date    TRIG 144 04/23/2020    TRIG 131 03/20/2015    TRIG 293 (H) 11/05/2014     Lab Results   Component Value Date    HDL 37 (L) 04/23/2021    HDL 39 (L) 04/23/2020    HDL 39 (L) 03/20/2015     Lab Results   Component Value Date    LDLCALC 87 04/23/2021    LDLCALC 63 04/23/2020    LDLCALC 68 03/20/2015     Lab Results   Component Value Date    LABVLDL 42 04/23/2021    LABVLDL 29 04/23/2020    LABVLDL 26 03/20/2015     PT/INR: No results for input(s): PROTIME, INR in the last 72 hours. A1C:   Lab Results   Component Value Date    LABA1C 6.1 04/23/2021     BNP:  No results for input(s): BNP in the last 72 hours.     IMAGING:   I have reviewed the following tests and documented in this encounter as follows:   Test results discussed with the patient. EKG 6/14/2021:   Atrial fibrillation, 100 BPM.      EKG 6/2/2020  Atrial fibrillation   -  Nonspecific T-abnormality. 92 bpm    Limited echo 66/0/6517:  Normal systolic function with an estimated ejection fraction of 55%. Upper limits of normal left ventricle size. There is mild concentric left ventricular hypertrophy. No regional wall motion abnormalities are seen. Normal left ventricular diastolic filling pressure. The left atrium is mildly dilated. The right atrium is moderately dilated. Cath LV gram on 4/2019 showed EF of 40%. [facility] 7/25/2019 [place]):  Procedures Performed:   1. Left heart catheterization  2. Selective left and right coronary angiogram  3. Left ventriculography     Procedure Findings:  1. Normal coronary angiogram  2. Reduced left ventricular function with EF estimated at 35-40%  3. Normal left heart hemodynamics       CHARLY 1/10/2017:  Ejection fraction is visually estimated to be 45-50 %.   Trivial mitral and tricuspid regurgitation.   No HARPREET clot   Compared to previous study from 8- ejection fraction remains unchanged. PROCEDURE PERFORMED: 8/11/15  1. Complex electrophysiology study with attempted induction of arrhythmia. 2. Left atrial pacing mapping and recording via coronary sinus catheter. 3. Three-dimensional map of the left atrium with the NavX mapping system. 4. Transeptal  x2.   5. Intracardiac ICE catheter placement. 6. Radiofrequency catheter ablation of persistent atrial fibrillation. 7. Direct-current cardioversion x1. NM 03/05/15:  Findings: Decreased activity seen at the anteroseptal region at both stress and rest, though this decrease is more noticeable at stress than rest. A small zone reversibility is also detected in this region by the computer model. No large areas of reversibility are seen. Left ventricular ejection fraction is 43%. End diastolic volume is 293 mL. End systolic volume is 60 mL. Septal hypokinesis.    Summary Atrial fibrillation at baseline. Normal response to Lexiscan. Echo: 02/24/15   Summary   Technically difficult study due to body habitus. Patient is in atrial   fibrillations with controlled ventricular response during this study. Overall left ventricular function is normal.   Ejection fraction is visually estimated to be 55 %. Left ventricle size is normal.   Moderate concentric left ventricular hypertrophy is present. Normal diastolic  function. Mitral valve is structurally normal.   Trivial mitral regurgitation is present. The left atrium is normal in size. The aortic valve is normal in structure and function. There is no   significant aortic regurgitation. The aortic root is enlarged ( 3.8cm) in size. IVC size is normal (<2.1 cm) and collapses > 50% with respiration   consistent with normal RA pressure (3 mmHg). No obvious masses, thrombi, or vegetations are noted. Assessment:  Encounter Diagnoses   Name Primary?  Essential hypertension     Longstanding persistent atrial fibrillation (HCC) Yes    Mixed hyperlipidemia     Chronic diastolic congestive heart failure (HCC)     Atrial flutter, unspecified type ([address]     Nonischemic cardiomyopathy ([address]        Diagnoses and associated orders for this visit:  1. Paroxysmal atrial fibrillation    -RFCA 8/2015, DCCV 1/10/17 and then again 8/2019, repeat RFCA 11/2019  2. Sinus bradycardia   3. Hypertension   4. Non ischemic cardiomyopathy    -EF 55% 10/2019  5. Chronic diastolic CHF  6. Chronic kidney disease    followed by Dr. Jose Watkins  7. JESSICA   -on CPAP     Plan:  1. Medications reviewed. Medications refilled as warranted. 2. CBC, CMP, TSH, and fasting lipids (8 hours) ordered. Due 4/2021.   3. Follow up with me in 6 months      QUALITY MEASURES  1. Tobacco Cessation Counseling: NA  2. Retake of BP if >140/90:   NA  3. Documentation to PCP/referring for new patient:  Sent to PCP at close of office visit  4.  CAD patient on anti-platelet NA anticoagulated  5. CAD patient on STATIN therapy:  Yes  6. Patient with CHF and aFib on anticoagulation:  Yes Xarelto     This note has been scribed in the presence of Raoul Gonsalves MD, by Dar Bhakta RN.   I, Dr. Bella Connor, personally performed the services described in this documentation, as scribed by the above signed scribe in my presence. It is both accurate and complete to my knowledge. I agree with the details independently gathered by the clinical support staff, while the remaining scribed note accurately describes my personal service to the patient.         Raoul Gonsalves MD  [facility]  [phone] [facility] office  [phone] [facility]

## 2021-12-01 DIAGNOSIS — N18.31 STAGE 3A CHRONIC KIDNEY DISEASE (HCC): ICD-10-CM

## 2021-12-01 LAB
ANION GAP SERPL CALCULATED.3IONS-SCNC: 12 MMOL/L (ref 3–16)
BUN BLDV-MCNC: 29 MG/DL (ref 7–20)
CALCIUM SERPL-MCNC: 9.6 MG/DL (ref 8.3–10.6)
CHLORIDE BLD-SCNC: 105 MMOL/L (ref 99–110)
CO2: 25 MMOL/L (ref 21–32)
CREAT SERPL-MCNC: 1.3 MG/DL (ref 0.8–1.3)
GFR AFRICAN AMERICAN: >60
GFR NON-AFRICAN AMERICAN: 55
GLUCOSE BLD-MCNC: 99 MG/DL (ref 70–99)
POTASSIUM SERPL-SCNC: 3.9 MMOL/L (ref 3.5–5.1)
SODIUM BLD-SCNC: 142 MMOL/L (ref 136–145)

## 2021-12-07 ASSESSMENT — PATIENT HEALTH QUESTIONNAIRE - PHQ9
SUM OF ALL RESPONSES TO PHQ9 QUESTIONS 1 & 2: 1
SUM OF ALL RESPONSES TO PHQ QUESTIONS 1-9: 1
2. FEELING DOWN, DEPRESSED OR HOPELESS: 1
1. LITTLE INTEREST OR PLEASURE IN DOING THINGS: 0

## 2021-12-07 ASSESSMENT — LIFESTYLE VARIABLES
AUDIT-C TOTAL SCORE: INCOMPLETE
HOW OFTEN DO YOU HAVE A DRINK CONTAINING ALCOHOL: 0
HOW OFTEN DO YOU HAVE A DRINK CONTAINING ALCOHOL: NEVER
AUDIT TOTAL SCORE: INCOMPLETE

## 2021-12-10 ENCOUNTER — OFFICE VISIT (OUTPATIENT)
Dept: FAMILY MEDICINE CLINIC | Age: 66
End: 2021-12-10
Payer: MEDICARE

## 2021-12-10 ENCOUNTER — HOSPITAL ENCOUNTER (OUTPATIENT)
Dept: GENERAL RADIOLOGY | Age: 66
Discharge: HOME | End: 2021-12-10
Payer: MEDICARE

## 2021-12-10 VITALS
BODY MASS INDEX: 40.44 KG/M2 | DIASTOLIC BLOOD PRESSURE: 74 MMHG | SYSTOLIC BLOOD PRESSURE: 126 MMHG | WEIGHT: 315 LBS | OXYGEN SATURATION: 98 % | HEART RATE: 103 BPM

## 2021-12-10 DIAGNOSIS — G89.29 CHRONIC PAIN OF LEFT KNEE: ICD-10-CM

## 2021-12-10 DIAGNOSIS — N18.31 STAGE 3A CHRONIC KIDNEY DISEASE (HCC): ICD-10-CM

## 2021-12-10 DIAGNOSIS — Z00.00 ROUTINE GENERAL MEDICAL EXAMINATION AT A HEALTH CARE FACILITY: Primary | ICD-10-CM

## 2021-12-10 DIAGNOSIS — M25.562 CHRONIC PAIN OF LEFT KNEE: ICD-10-CM

## 2021-12-10 DIAGNOSIS — R19.00 ABDOMINAL WALL BULGE: ICD-10-CM

## 2021-12-10 DIAGNOSIS — I10 ESSENTIAL HYPERTENSION: ICD-10-CM

## 2021-12-10 DIAGNOSIS — I48.0 PAROXYSMAL ATRIAL FIBRILLATION (HCC): ICD-10-CM

## 2021-12-10 DIAGNOSIS — E78.2 MIXED HYPERLIPIDEMIA: ICD-10-CM

## 2021-12-10 PROCEDURE — G0438 PPPS, INITIAL VISIT: HCPCS | Performed by: FAMILY MEDICINE

## 2021-12-10 PROCEDURE — 1036F TOBACCO NON-USER: CPT | Performed by: FAMILY MEDICINE

## 2021-12-10 PROCEDURE — G8417 CALC BMI ABV UP PARAM F/U: HCPCS | Performed by: FAMILY MEDICINE

## 2021-12-10 PROCEDURE — G8484 FLU IMMUNIZE NO ADMIN: HCPCS | Performed by: FAMILY MEDICINE

## 2021-12-10 PROCEDURE — G8428 CUR MEDS NOT DOCUMENT: HCPCS | Performed by: FAMILY MEDICINE

## 2021-12-10 PROCEDURE — 73560 X-RAY EXAM OF KNEE 1 OR 2: CPT

## 2021-12-10 PROCEDURE — 4040F PNEUMOC VAC/ADMIN/RCVD: CPT | Performed by: FAMILY MEDICINE

## 2021-12-10 PROCEDURE — 99214 OFFICE O/P EST MOD 30 MIN: CPT | Performed by: FAMILY MEDICINE

## 2021-12-10 PROCEDURE — 3017F COLORECTAL CA SCREEN DOC REV: CPT | Performed by: FAMILY MEDICINE

## 2021-12-10 PROCEDURE — 1123F ACP DISCUSS/DSCN MKR DOCD: CPT | Performed by: FAMILY MEDICINE

## 2021-12-10 RX ORDER — ONDANSETRON 4 MG/1
4 TABLET, ORALLY DISINTEGRATING ORAL EVERY 8 HOURS PRN
Qty: 30 TABLET | Refills: 1 | Status: SHIPPED | OUTPATIENT
Start: 2021-12-10

## 2021-12-10 ASSESSMENT — ENCOUNTER SYMPTOMS
NAUSEA: 1
BLOOD IN STOOL: 0

## 2021-12-10 NOTE — PROGRESS NOTES
Sandy Clark is a 74 y.o. male    Chief Complaint   Patient presents with    Medicare AWV    Hernia     Abd pain    Knee Pain     Left- Very painful, to the point of not working (bend)      Hypertension    Hyperlipidemia    Atrial Fibrillation    Chronic Kidney Disease       HPI:    HPI    Hypertension   This is a chronic problem. The current episode started more than 1 year ago. The problem is unchanged. The problem is controlled. Risk factors for coronary artery disease include male gender. Past treatments include beta blockers, ACE inhibitors and direct vasodilators. The current treatment provides significant improvement. There are no compliance problems. Hyperlipidemia   This is a chronic problem. The current episode started more than 1 year ago. The problem is controlled. Recent lipid tests were reviewed and are normal. He has no history of diabetes. Pertinent negatives include no myalgias. Current antihyperlipidemic treatment includes statins. The current treatment provides significant improvement of lipids. There are no compliance problems. Risk factors for coronary artery disease include hypertension, male sex and obesity. Atrial fibrillation, paroxysmal. Had a recent flare. He was switched to Metoprolol from Carvedilol. No blood in the stool. No history of CVA.     CHF, systolic. This is a chronic condition. Leg edema has improved since his cardioversion/ablation. Takes Lasix and Eplerenone in the morning.      CKD. Patient sees Nephrology. 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 Left knee pain. This is a chronic issue. No injury. History of meniscus repair. No numbness or tingling. Abdominal bulge. This is a chronic issue. He has lots of bloating and nausea. History of umbilical surgery in the past.  No rectal bleeding. ROS:    Review of Systems   Gastrointestinal: Positive for nausea. Negative for blood in stool. Neurological: Negative for numbness.        BP 126/74   Pulse 103   Wt (!) 315 lb (142.9 kg)   SpO2 98%   BMI 40.44 kg/m²     Physical Exam:    Physical Exam  Constitutional:       General: He is not in acute distress. Appearance: Normal appearance. He is obese. He is not ill-appearing or toxic-appearing. HENT:      Head: Normocephalic. Neurological:      Mental Status: He is alert. Psychiatric:         Mood and Affect: Mood normal.         Behavior: Behavior normal.         Thought Content: Thought content normal.     Diastasis recti present. No true umbilical hernia appreciated. Mild pain with left knee range of motion.     Current Outpatient Medications   Medication Sig Dispense Refill    ondansetron (ZOFRAN ODT) 4 MG disintegrating tablet Take 1 tablet by mouth every 8 hours as needed for Nausea or Vomiting 30 tablet 1    lisinopril (PRINIVIL;ZESTRIL) 20 MG tablet TAKE 1 TABLET BY MOUTH TWICE A  tablet 3    pravastatin (PRAVACHOL) 40 MG tablet TAKE 1 TABLET BY MOUTH EVERY DAY 90 tablet 3    isosorbide mononitrate (IMDUR) 30 MG extended release tablet Take once per day 90 tablet 3    metoprolol succinate (TOPROL XL) 50 MG extended release tablet TAKE 1 TABLET BY MOUTH TWICE A  tablet 3    hydrALAZINE (APRESOLINE) 25 MG tablet TAKE 1 TABLET BY MOUTH TWICE A  tablet 3    rivaroxaban (XARELTO) 20 MG TABS tablet TAKE 1 TABLET BY MOUTH EVERY DAY 90 tablet 3    furosemide (LASIX) 40 MG tablet TAKE 1 TABLET BY MOUTH EVERY DAY IN THE MORNING 90 tablet 3    eplerenone (INSPRA) 25 MG tablet TAKE 1 TABLET BY MOUTH EVERY DAY 30 tablet 5    hydrOXYzine (ATARAX) 25 MG tablet Take 1 tablet by mouth every 8 hours as needed for Anxiety 30 tablet 2    melatonin (RA MELATONIN) 3 MG TABS tablet Take 1.5 tablets by mouth daily (Patient not taking: Reported on 11/29/2021) 60 tablet 3    fluticasone (FLONASE) 50 MCG/ACT nasal spray 2 sprays by Each Nostril route daily 1 Bottle 5     No current facility-administered medications for this visit. Assessment:    1. Routine general medical examination at a health care facility    2. Essential hypertension    3. Mixed hyperlipidemia    4. Paroxysmal atrial fibrillation (HCC)    5. Stage 3a chronic kidney disease (HCC)    6. Abdominal wall bulge    7. Chronic pain of left knee        Plan:    1. Routine general medical examination at a health care facility    2. Essential hypertension  Stable. Continue current medications. 3. Mixed hyperlipidemia  Stable. Continue current medications. 4. Paroxysmal atrial fibrillation (HCC)  Stable. Continue current medications. 5. Stage 3a chronic kidney disease (HCC)  Stable. Continue current medications. 6. Abdominal wall bulge  Diastases recti is present which is not usually concerning. Discussed getting a colonoscopy. Try the Zofran for the nausea. If he continues to feel a bulge, he will see general surgery. - ondansetron (ZOFRAN ODT) 4 MG disintegrating tablet; Take 1 tablet by mouth every 8 hours as needed for Nausea or Vomiting  Dispense: 30 tablet; Refill: 1  - Ludivina Roach MD, General Surgery, [facility]    7. Chronic pain of left knee  X-ray showed moderate arthritis today. Try the exercises and physical therapy. - XR KNEE LEFT (1-2 VIEWS); Future  - [facility] [place]      Return in 6 months (on 6/10/2022) for Hypertension, Hyperlipidemia, kidney disease, left knee pain. Medicare Annual Wellness Visit  Name: Javier Angel Date: 12/10/2021   MRN: <[id number]> Sex: Male   Age: 74 y.o.  Ethnicity: Non- / Non    : 1955 Race: White (non-)      [place] is here for Medicare AWV, Hernia (Abd pain), Knee Pain (Left- Very painful, to the point of not working (bend)  ), Hypertension, Hyperlipidemia, Atrial Fibrillation, and Chronic Kidney Disease    Screenings for behavioral, psychosocial and functional/safety risks, and cognitive dysfunction are all negative except as indicated below. These results, as well as other patient data from the [address] form, are documented in Flowsheets linked to this Encounter. No Known Allergies      Prior to Visit Medications    Medication Sig Taking?  Authorizing Provider   ondansetron (ZOFRAN ODT) 4 MG disintegrating tablet Take 1 tablet by mouth every 8 hours as needed for Nausea or Vomiting Yes Marv Bright DO   lisinopril (PRINIVIL;ZESTRIL) 20 MG tablet TAKE 1 TABLET BY MOUTH TWICE A DAY  Philip Neely MD   pravastatin (PRAVACHOL) 40 MG tablet TAKE 1 TABLET BY MOUTH EVERY DAY  Philip Neely MD   isosorbide mononitrate (IMDUR) 30 MG extended release tablet Take once per day  Philip Neely MD   metoprolol succinate (TOPROL XL) 50 MG extended release tablet TAKE 1 TABLET BY MOUTH TWICE A DAY  Philip Neely MD   hydrALAZINE (APRESOLINE) 25 MG tablet TAKE 1 TABLET BY MOUTH TWICE A DAY  Philip Neely MD   rivaroxaban (XARELTO) 20 MG TABS tablet TAKE 1 TABLET BY MOUTH EVERY DAY  Philip Neely MD   furosemide (LASIX) 40 MG tablet TAKE 1 TABLET BY MOUTH EVERY DAY IN THE MORNING  Philip Neely MD   eplerenone (INSPRA) 25 MG tablet TAKE 1 TABLET BY MOUTH EVERY DAY  Philip Neely MD   hydrOXYzine (ATARAX) 25 MG tablet Take 1 tablet by mouth every 8 hours as needed for Anxiety  Alex Qureshi DO   melatonin (RA MELATONIN) 3 MG TABS tablet Take 1.5 tablets by mouth daily  Patient not taking: Reported on 11/29/2021  Ayah Bennett PA-C   fluticasone (FLONASE) 50 MCG/ACT nasal spray 2 sprays by Each Nostril route daily  Marv Bright DO         Past Medical History:   Diagnosis Date    Atrial fibrillation ([facility]     Cardiomyopathy ([facility] 07/2019    EF= 35-40%    Chest pain 07/2019    CHF (congestive heart failure) (HCC)     CKD (chronic kidney disease) stage 3, GFR 30-59 ml/min (HCC)     has only one functioning kidney    Family history of early CAD     Hyperlipidemia     Hypertension     JESSICA (obstructive sleep apnea)     uses CPAP    S/P ablation of atrial fibrillation 8/11/2015,11-25-19    RFCA       Past Surgical History:   Procedure Laterality Date    ABLATION OF DYSRHYTHMIC FOCUS  08/17/2015    CHARLY/ Atrial Fib Ablation    ABLATION OF DYSRHYTHMIC FOCUS  11/25/2019    for Atrial Fib    BACK SURGERY      CARDIAC CATHETERIZATION  07/25/2019    Non- Ischemic Cardiomyopathy    CARDIOVERSION  03/23/2015    Atrial Fib to SR    CARDIOVERSION  01/10/2017    A Fib- SR    COLONOSCOPY N/A 1/30/2020    COLONOSCOPY W/ ANESTHESIA -SLEEP APNEA- performed by Acacia Pham MD at [address]  1/30/2020    COLONOSCOPY WITH BIOPSY performed by Acacia Pham MD at [address] COLONOSCOPY N/A 1/30/2020    COLONOSCOPY POLYPECTOMY SNARE/COLD BIOPSY performed by Acacia Pham MD at [address] [place] [address]      umbilical    KNEE ARTHROSCOPY Left 03/20/2012    Left    TRANSESOPHAGEAL ECHOCARDIOGRAM  8/11/2015    TRANSESOPHAGEAL ECHOCARDIOGRAM  01/10/2017         Family History   Problem Relation Age of Onset    High Blood Pressure Mother     Heart Disease Mother     Diabetes Mother     Osteoarthritis Mother     Cancer Father     Heart Disease Sister     Diabetes Brother        CareTeam (Including outside providers/suppliers regularly involved in providing care):   Patient Care Team:  Andrew Tam DO as PCP - General  Andrew Tam DO as PCP - [facility] Empaneled Provider  Claudette Finch, MD as Consulting Physician (Cardiology)  Valerie Ornelas MD as Consulting Physician (Pulmonology)  WESTON Barroso - CNP as Nurse Practitioner (Family Nurse Practitioner)  Lisa Keita MD as Consulting Physician (Urology)  Claudette Finch, MD as Consulting Physician (Cardiology)    Wt Readings from Last 3 Encounters:   12/10/21 (!) 315 lb (142.9 kg)   11/29/21 (!) 315 lb 8 oz (143.1 kg)   08/09/21 (!) 330 lb (149.7 kg)     Vitals:    12/10/21 0822   BP: 126/74 Pulse: 103   SpO2: 98%   Weight: (!) 315 lb (142.9 kg)     Body mass index is 40.44 kg/m². Based upon direct observation of the patient, evaluation of cognition reveals recent and remote memory intact. Patient's complete Health Risk Assessment and screening values have been reviewed and are found in Flowsheets. The following problems were reviewed today and where indicated follow up appointments were made and/or referrals ordered.     Positive Risk Factor Screenings with Interventions:           Health Habits/Nutrition:  Health Habits/Nutrition  Do you exercise for at least 20 minutes 2-3 times per week?: (!) No  Have you lost any weight without trying in the past 3 months?: (!) Yes  Do you eat only one meal per day?: No  Have you seen the dentist within the past year?: Yes     Health Habits/Nutrition Interventions:  · not correct, no weight loss     Safety:  Safety  Do you have working smoke detectors?: Yes  Have all throw rugs been removed or fastened?: (!) No  Do you have non-slip mats or surfaces in all bathtubs/showers?: Yes  Do all of your stairways have a railing or banister?: (!) No  Are your doorways, halls and stairs free of clutter?: Yes  Do you always fasten your seatbelt when you are in a car?: Yes  Safety Interventions:  · Home safety tips provided     Personalized Preventive Plan   Current Health Maintenance Status  Immunization History   Administered Date(s) Administered    COVID-19, [place], PF, 30mcg/0.3mL 05/21/2021, 06/11/2021    Influenza Virus Vaccine 09/01/2017    Influenza, High-dose, Quadv, 65 yrs +, IM (Fluzone) 11/02/2021    Influenza, MDCK Quadv, IM, PF (Flucelvax 2 yrs and older) 10/19/2018    Pneumococcal Polysaccharide (Pantgnoby48) 04/20/2016    Tdap (Boostrix, Adacel) 12/06/2021    Zoster Recombinant (Shingrix) 08/25/2021        Health Maintenance   Topic Date Due    Colon cancer screen colonoscopy  01/30/2021    Pneumococcal 65+ years Vaccine (1 of 1 - PPSV23) 04/20/2021    Shingles Vaccine (2 of 2) 10/20/2021    Annual Wellness Visit (AWV)  12/05/2021    COVID-19 Vaccine (3 - Booster for Pfizer series) 12/11/2021    A1C test (Diabetic or Prediabetic)  04/23/2022    Lipid screen  04/23/2022    Potassium monitoring  12/01/2022    Creatinine monitoring  12/01/2022    DTaP/Tdap/Td vaccine (2 - Td or Tdap) 12/06/2031    Flu vaccine  Completed    Hepatitis C screen  Completed    Hepatitis A vaccine  Aged Out    Hepatitis B vaccine  Aged Out    Hib vaccine  Aged Out    Meningococcal (ACWY) vaccine  Aged Out     Recommendations for [employer] Due: see orders and patient instructions/AVS.  . Recommended screening schedule for the next 5-10 years is provided to the patient in written form: see Patient Instructions/AVS.    Cachorro Carlos was seen today for medicare awv, hernia, knee pain, hypertension, hyperlipidemia, atrial fibrillation and chronic kidney disease. Diagnoses and all orders for this visit:    Routine general medical examination at a health care facility    Essential hypertension    Mixed hyperlipidemia    Paroxysmal atrial fibrillation (HCC)    Stage 3a chronic kidney disease (HCC)    Abdominal wall bulge  -     ondansetron (ZOFRAN ODT) 4 MG disintegrating tablet; Take 1 tablet by mouth every 8 hours as needed for Nausea or Vomiting  -     Vickie Yates MD, General Surgery, [facility]    Chronic pain of left knee  -     XR KNEE LEFT (1-2 VIEWS);  [facility]  -     [facility] - [place]

## 2021-12-10 NOTE — PATIENT INSTRUCTIONS
Personalized Preventive Plan for [place] People - 12/10/2021  Medicare offers a range of preventive health benefits. Some of the tests and screenings are paid in full while other may be subject to a deductible, co-insurance, and/or copay. Some of these benefits include a comprehensive review of your medical history including lifestyle, illnesses that may run in your family, and various assessments and screenings as appropriate. After reviewing your medical record and screening and assessments performed today your provider may have ordered immunizations, labs, imaging, and/or referrals for you. A list of these orders (if applicable) as well as your Preventive Care list are included within your After Visit Summary for your review. Other Preventive Recommendations:    · A preventive eye exam performed by an eye specialist is recommended every 1-2 years to screen for glaucoma; cataracts, macular degeneration, and other eye disorders. · A preventive dental visit is recommended every 6 months. · Try to get at least 150 minutes of exercise per week or 10,000 steps per day on a pedometer . · Order or download the FREE \"Exercise & Physical Activity: Your Everyday Guide\" from [employer]. Call 5-336.563.8206 or search [employer] online. · You need 8500-5289 mg of calcium and 3182-9479 IU of vitamin D per day. It is possible to meet your calcium requirement with diet alone, but a vitamin D supplement is usually necessary to meet this goal.  · When exposed to the sun, use a sunscreen that protects against both UVA and UVB radiation with an SPF of 30 or greater. Reapply every 2 to 3 hours or after sweating, drying off with a towel, or swimming. · Always wear a seat belt when traveling in a car. Always wear a helmet when riding a bicycle or motorcycle. Patient Education        Knee Arthritis: Exercises  Introduction  Here are some examples of exercises for you to try.  The exercises may be suggested for a condition or for rehabilitation. Start each exercise slowly. Ease off the exercises if you start to have pain. You will be told when to start these exercises and which ones will work best for you. How to do the exercises  Knee flexion with heel slide    Lie on your back with your knees bent. Slide your heel back by bending your affected knee as far as you can. Then hook your other foot around your ankle to help pull your heel even farther back. Hold for about 6 seconds, then rest for up to 10 seconds. Repeat 8 to 12 times. Switch legs and repeat steps 1 through 4, even if only one knee is sore. Quad [place] [employer] [place] with your affected leg straight and supported on the floor or a firm bed. Place a small, rolled-up towel under your knee. Your other leg should be bent, with that foot flat on the floor. Tighten the thigh muscles of your affected leg by pressing the back of your knee down into the towel. Hold for about 6 seconds, then rest for up to 10 seconds. Repeat 8 to 12 times. Switch legs and repeat steps 1 through 4, even if only one knee is sore. Straight-leg raises to the front    Lie on your back with your good knee bent so that your foot rests flat on the floor. Your affected leg should be straight. Make sure that your low back has a normal curve. You should be able to slip your hand in between the floor and the small of your back, with your palm touching the floor and your back touching the back of your hand. Tighten the thigh muscles in your affected leg by pressing the back of your knee flat down to the floor. Hold your knee straight. Keeping the thigh muscles tight and your leg straight, lift your affected leg up so that your heel is about 12 inches off the floor. Hold for about 6 seconds, then lower slowly. Relax for up to 10 seconds between repetitions. Repeat 8 to 12 times. Switch legs and repeat steps 1 through 5, even if only one knee is sore.   Active knee flexion    Lie on your stomach with your knees straight. If your kneecap is uncomfortable, roll up a washcloth and put it under your leg just above your kneecap. Lift the foot of your affected leg by bending the knee so that you bring the foot up toward your buttock. If this motion hurts, try it without bending your knee quite as far. This may help you avoid any painful motion. Slowly move your leg up and down. Repeat 8 to 12 times. Switch legs and repeat steps 1 through 4, even if only one knee is sore. Quadriceps stretch (facedown)    Lie flat on your stomach, and rest your face on the floor. Wrap a towel or belt strap around the lower part of your affected leg. Then use the towel or belt strap to slowly pull your heel toward your buttock until you feel a stretch. Hold for about 15 to 30 seconds, then relax your leg against the towel or belt strap. Repeat 2 to 4 times. Switch legs and repeat steps 1 through 4, even if only one knee is sore. Stationary exercise bike    If you do not have a stationary exercise bike at home, you can find one to ride at your local health club or community center. Adjust the height of the bike seat so that your knee is slightly bent when your leg is extended downward. If your knee hurts when the pedal reaches the top, you can raise the seat so that your knee does not bend as much. Start slowly. At first, try to do 5 to 10 minutes of cycling with little to no resistance. Then increase your time and the resistance bit by bit until you can do 20 to 30 minutes without pain. If you start to have pain, rest your knee until your pain gets back to the level that is normal for you. Or cycle for less time or with less effort. Follow-up care is a key part of your treatment and safety. Be sure to make and go to all appointments, and call your doctor if you are having problems. It's also a good idea to know your test results and keep a list of the medicines you take.   Where can you learn more? Go to [email or web address] org and sign in to your [employer] account. Enter C159 in the [facility] box to learn more about \"Knee Arthritis: Exercises. \"     If you do not have an account, please click on the \"Sign Up Now\" link. Current as of: July 1, 2021               Content Version: 13.0  © 2006-2021 [employer]. Care instructions adapted under license by [facility] [place] ([facility]). If you have questions about a medical condition or this instruction, always ask your healthcare professional. [place] [address] any warranty or liability for your use of this information. Patient Education        Knee: Exercises  Introduction  Here are some examples of exercises for you to try. The exercises may be suggested for a condition or for rehabilitation. Start each exercise slowly. Ease off the exercises if you start to have pain. You will be told when to start these exercises and which ones will work best for you. How to do the exercises  Quad sets    Sit with your leg straight and supported on the floor or a firm bed. (If you feel discomfort in the front or back of your knee, place a small towel roll under your knee.)  Tighten the muscles on top of your thigh by pressing the back of your knee flat down to the floor. (If you feel discomfort under your kneecap, place a small towel roll under your knee.)  Hold for about 6 seconds, then rest for up to 10 seconds. Do 8 to 12 repetitions several times a day. Straight-leg raises to the front    Lie on your back with your good knee bent so that your foot rests flat on the floor. Your injured leg should be straight. Make sure that your low back has a normal curve. You should be able to slip your flat hand in between the floor and the small of your back, with your palm touching the floor and your back touching the back of your hand.   Tighten the thigh muscles in the injured leg by pressing the back of your knee to 12 repetitions. Hamstring curls    Lie on your stomach with your knees straight. If your kneecap is uncomfortable, roll up a washcloth and put it under your leg just above your kneecap. Lift the foot of your injured leg by bending the knee so that you bring the foot up toward your buttock. If this motion hurts, try it without bending your knee quite as far. This may help you avoid any painful motion. Slowly lower your leg back to the floor. Do 8 to 12 repetitions. With permission from your doctor or physical therapist, you may also want to add a cuff weight to your ankle (not more than 5 pounds). With weight, you do not have to lift your leg more than 12 inches to get a hamstring workout. Shallow standing knee bends    Do this exercise only if you have very little pain; if you have no clicking, locking, or giving way if you have an injured knee; and if it does not hurt while you are doing 8 to 12 repetitions. Stand with your hands lightly resting on a counter or chair in front of you. Put your feet shoulder-width apart. Slowly bend your knees so that you squat down like you are going to sit in a chair. Make sure your knees do not go in front of your toes. Lower yourself about 6 inches. Your heels should remain on the floor at all times. Rise slowly to a standing position. Heel raises    Stand with your feet a few inches apart, with your hands lightly resting on a counter or chair in front of you. Slowly raise your heels off the floor while keeping your knees straight. Hold for about 6 seconds, then slowly lower your heels to the floor. Do 8 to 12 repetitions several times during the day. Follow-up care is a key part of your treatment and safety. Be sure to make and go to all appointments, and call your doctor if you are having problems. It's also a good idea to know your test results and keep a list of the medicines you take. Where can you learn more? Go to [email or web address]://tuan.[email or web address] org and sign in to your [employer] account. Enter S715 in the [employer] box to learn more about \"Knee: Exercises. \"     If you do not have an account, please click on the \"Sign Up Now\" link. Current as of: July 1, 2021               Content Version: 13.0  © 0758-3922 [employer]. Care instructions adapted under license by [facility] ([facility]). If you have questions about a medical condition or this instruction, always ask your healthcare professional. [address] any warranty or liability for your use of this information.

## 2022-01-06 ENCOUNTER — OFFICE VISIT (OUTPATIENT)
Dept: CARDIOLOGY CLINIC | Age: 67
End: 2022-01-06
Payer: MEDICARE

## 2022-01-06 VITALS
HEART RATE: 90 BPM | BODY MASS INDEX: 40.43 KG/M2 | HEIGHT: 74 IN | WEIGHT: 315 LBS | SYSTOLIC BLOOD PRESSURE: 112 MMHG | OXYGEN SATURATION: 100 % | DIASTOLIC BLOOD PRESSURE: 74 MMHG

## 2022-01-06 DIAGNOSIS — I10 ESSENTIAL HYPERTENSION: ICD-10-CM

## 2022-01-06 DIAGNOSIS — I48.91 ATRIAL FIBRILLATION, UNSPECIFIED TYPE (HCC): Primary | ICD-10-CM

## 2022-01-06 PROCEDURE — G8427 DOCREV CUR MEDS BY ELIG CLIN: HCPCS | Performed by: NURSE PRACTITIONER

## 2022-01-06 PROCEDURE — 1123F ACP DISCUSS/DSCN MKR DOCD: CPT | Performed by: NURSE PRACTITIONER

## 2022-01-06 PROCEDURE — G8417 CALC BMI ABV UP PARAM F/U: HCPCS | Performed by: NURSE PRACTITIONER

## 2022-01-06 PROCEDURE — 99214 OFFICE O/P EST MOD 30 MIN: CPT | Performed by: NURSE PRACTITIONER

## 2022-01-06 PROCEDURE — 4040F PNEUMOC VAC/ADMIN/RCVD: CPT | Performed by: NURSE PRACTITIONER

## 2022-01-06 PROCEDURE — 3017F COLORECTAL CA SCREEN DOC REV: CPT | Performed by: NURSE PRACTITIONER

## 2022-01-06 PROCEDURE — G8484 FLU IMMUNIZE NO ADMIN: HCPCS | Performed by: NURSE PRACTITIONER

## 2022-01-06 PROCEDURE — 1036F TOBACCO NON-USER: CPT | Performed by: NURSE PRACTITIONER

## 2022-01-06 PROCEDURE — 93000 ELECTROCARDIOGRAM COMPLETE: CPT | Performed by: NURSE PRACTITIONER

## 2022-01-06 NOTE — PROGRESS NOTES
[address]   Electrophysiology Outpatient Note              Date:  January 6, 2022  Patient name: Brian Childress  YOB: 1955    Primary Care physician: Zack Roberts DO    HISTORY OF PRESENT ILLNESS: The patient is a 74 y.o.  male with a history of AF, SB, NICM, CHF, HLD, JESSICA and CKD. He reports that in 2007 he had a CV for AF. In 3/2015, he had another CV. In 8/2015, he he had RFCA of AF. In 10/2016, amiodarone was discontinued. In 12/2016, AF was recurrent and amiodarone was restarted. In 1/2017, he had a cardioversion. In 7/2019, he had a LHC that showed normal coronaries and NICM. In 8/2019, he had another CV. In 10/2019, echo showed an EF of 55%. In 11/2019, he underwent a repeat ablation for AF. In 4/2020, he was back in AF and due to lack of symptoms, amiodarone was stopped. Today he is being seen for AF. EKG shows AF with a HR of 104. Patient complains of knee pain due to arthritis. Denies chest pain, palpitations, shortness of breath, and dizziness. Past Medical History:   has a past medical history of Atrial fibrillation ([address] Cardiomyopathy ([address] Chest pain, CHF (congestive heart failure) ([address] CKD (chronic kidney disease) stage 3, GFR 30-59 ml/min ([address] Family history of early CAD, Hyperlipidemia, Hypertension, JESSICA (obstructive sleep apnea), and S/P ablation of atrial fibrillation. Past Surgical History:   has a past surgical history that includes back surgery; hernia repair; Knee arthroscopy (Left, 03/20/2012); Cardioversion (03/23/2015); transesophageal echocardiogram (8/11/2015); ablation of dysrhythmic focus (08/17/2015); Cardioversion (01/10/2017); transesophageal echocardiogram (01/10/2017); Cardiac catheterization (07/25/2019); ablation of dysrhythmic focus (11/25/2019); Colonoscopy (N/A, 1/30/2020); Colonoscopy (1/30/2020); and Colonoscopy (N/A, 1/30/2020).      Home Medications:    Prior to Admission medications    Medication Sig Start Date End Date Taking? Authorizing Provider   ondansetron (ZOFRAN ODT) 4 MG disintegrating tablet Take 1 tablet by mouth every 8 hours as needed for Nausea or Vomiting 12/10/21  Yes Tima Garcia DO   lisinopril (PRINIVIL;ZESTRIL) 20 MG tablet TAKE 1 TABLET BY MOUTH TWICE A DAY 11/29/21  Yes Nafisa Ferrara MD   pravastatin (PRAVACHOL) 40 MG tablet TAKE 1 TABLET BY MOUTH EVERY DAY 11/29/21  Yes Nafisa Ferrara MD   isosorbide mononitrate (IMDUR) 30 MG extended release tablet Take once per day 11/29/21  Yes Nafisa Ferrara MD   metoprolol succinate (TOPROL XL) 50 MG extended release tablet TAKE 1 TABLET BY MOUTH TWICE A DAY 11/29/21  Yes Nafisa Ferrara MD   hydrALAZINE (APRESOLINE) 25 MG tablet TAKE 1 TABLET BY MOUTH TWICE A DAY 11/29/21  Yes Nafisa Ferrara MD   rivaroxaban (XARELTO) 20 MG TABS tablet TAKE 1 TABLET BY MOUTH EVERY DAY 11/29/21  Yes Nafisa Ferrara MD   furosemide (LASIX) 40 MG tablet TAKE 1 TABLET BY MOUTH EVERY DAY IN THE MORNING 11/29/21  Yes Nafisa Ferrara MD   eplerenone (INSPRA) 25 MG tablet TAKE 1 TABLET BY MOUTH EVERY DAY 10/14/21  Yes Nafisa Ferrara MD   hydrOXYzine (ATARAX) 25 MG tablet Take 1 tablet by mouth every 8 hours as needed for Anxiety  Patient not taking: Reported on 1/6/2022 6/24/21   Alex Qureshi DO   melatonin (RA MELATONIN) 3 MG TABS tablet Take 1.5 tablets by mouth daily  Patient not taking: Reported on 11/29/2021 6/14/21   Keenan Clark PA-C   fluticasone Romilda Buck) 50 MCG/ACT nasal spray 2 sprays by Each Nostril route daily  Patient not taking: Reported on 1/6/2022 6/3/21   Tima Garcia DO       Allergies:  Patient has no known allergies. Social History:   reports that he has never smoked. He has never used smokeless tobacco. He reports that he does not drink alcohol and does not use drugs.      Family History: family history includes Cancer in his father; Diabetes in his brother and mother; Heart Disease in his mother and sister; High Blood Pressure in his mother; Osteoarthritis in his mother. All 14 point review of systems are completed and pertinent positives are mentioned in the history of present illness. Other systems are reviewed and are negative. PHYSICAL EXAM:    Vital signs:    /74   Pulse 90   Ht 6' 2\" (1.88 m)   Wt (!) 315 lb (142.9 kg)   SpO2 100%   BMI 40.44 kg/m²      Constitutional and general appearance: alert, cooperative, no distress and appears stated age  HEENT: PERRL, no cervical lymphadenopathy. No masses palpable. Normal oral mucosa  Respiratory:  · Normal excursion and expansion without use of accessory muscles  · Resp auscultation: Normal breath sounds without wheezing, rhonchi, and rales  Cardiovascular:  · The apical impulse is not displaced  · Heart tones are crisp and normal. irregular S1 and S2.  · Jugular venous pulsation Normal  · The carotid upstroke is normal in amplitude and contour without delay or bruit  · Peripheral pulses are symmetrical and full   Abdomen:  · No masses or tenderness  · Bowel sounds present  Extremities:  ·  No cyanosis or clubbing  ·  No lower extremity edema  ·  Skin: warm and dry  Neurological:  · Alert and oriented  · Moves all extremities well  · No abnormalities of mood, affect, memory, mentation, or behavior are noted    DATA:    ECG 1/6/2022:   bpm    Echo 10/2019:     Summary   Limited echo for LV function and atrial size. Normal systolic function with an estimated ejection fraction of 55%. Upper limits of normal left ventricle size. There is mild concentric left ventricular hypertrophy. No regional wall motion abnormalities are seen. Normal left ventricular diastolic filling pressure. The left atrium is mildly dilated. The right atrium is moderately dilated. Cath LV gram on 4/2019 showed EF of 40%. All labs and testing reviewed. CARDIOLOGY LABS:   CBC: No results for input(s): WBC, HGB, HCT, PLT in the last 72 hours.   BMP: No results for input(s): NA, K, CO2, BUN, CREATININE, LABGLOM, GLUCOSE in the last 72 hours. PT/INR: No results for input(s): PROTIME, INR in the last 72 hours. APTT:No results for input(s): APTT in the last 72 hours. FASTING LIPID PANEL:  Lab Results   Component Value Date    HDL 37 04/23/2021    HDL 34 03/28/2012    LDLCALC 87 04/23/2021    TRIG 144 04/23/2020     LIVER PROFILE:No results for input(s): AST, ALT, ALB in the last 72 hours.     IMPRESSION:        Assessment:   Persistent atrial fibrillation: stable              -s/p RFCA 8/2015 (no known recurrence until 12/2016)              -s/p DCCV on 1/10/2017, recurrent 7/2019, s/p CV 8/2019              -s/p RFCA 11/2019              -recurrent 4/2020 (asymptomatic, amiodarone stopped 6/2020)              -[id number] score 3 (HTN, age, and CHF)  Sinus bradycardia: stable and historically asymptomatic  HTN: controlled   NICM: resolved                     -JZ 75% 81/6357  Chronic diastolic CHF: compensated   CKD stage III: Followed by Dr. Martinez Members  Left renal atrophy: followed by urology   JESSICA: on CPAP, compliant       Plan:   Continue Xarelto, lisinopril, Toprol, Lasix, Imdur, and hydralazine   CBC due 4/2022  BMP due 12/2022  Follow up with Dr. Jessica Wolf per his recommendations  Follow up with EP in one year     RAMILA Peterson, APRN-CNP  [facility]  [phone]

## 2022-01-11 ENCOUNTER — HOSPITAL ENCOUNTER (OUTPATIENT)
Dept: PHYSICAL THERAPY | Age: 67
Setting detail: THERAPIES SERIES
Discharge: HOME | End: 2022-01-11
Payer: MEDICARE

## 2022-01-11 PROCEDURE — 97161 PT EVAL LOW COMPLEX 20 MIN: CPT

## 2022-01-11 PROCEDURE — 97530 THERAPEUTIC ACTIVITIES: CPT

## 2022-01-11 NOTE — FLOWSHEET NOTE
[facility], [facility], [address] [place] [address] Dr  Phone: [phone]  Fax [phone]    Physical Therapy Daily Treatment Note    Date:  2022    Patient Name:  Nemo Garcia    :  1955  MRN: [id number]  Restrictions/Precautions:    Medical/Treatment Diagnosis Information:  · Diagnosis: M25.562, G89.29 (ICD-10-CM) - Chronic pain of left knee  Insurance/Certification information:  PT Insurance Information: Medicare  Physician Information:  Referring Practitioner: Adelita Gracia DO  Plan of care signed (Y/N):  N  Visit# / total visits:       G-Code (if applicable):          LEFS: 3980    Medicare Cap (if applicable):   124= total amount used, updated 2022    Time in:   10:45      Timed Treatment: 10 Total Treatment Time:  42  Time out: 11:27  ________________________________________________________________________________________    Pain Level:    /10  SUBJECTIVE:  See Initial Evaluation     OBJECTIVE:     Exercise/Equipment Resistance/Repetitions Other comments                                                                                                                                             Other Therapeutic Activities: Pt was educated on diagnosis, involved anatomy, POC, established goals, and facility policies.       Manual Treatments:         Modalities:      Test/Measurements:         ASSESSMENT:  See Initial Evaluation        Treatment/Activity Tolerance:   [x]Patient tolerated treatment well [] Patient limited by fatique  []Patient limited by pain [] Patient limited by other medical complications  [] Other:     Goals:        Long term goals  Time Frame for Long term goals : 4 weeks  Long term goal 1: Pt will be independent and compliant with HEP  Long term goal 2: Pt will improve gross LE strength by 1/3 MMT  Long term goal 3: Pt will report at least 25% improvement in knee pain  Long term goal 4: Pt will improve R knee flexion to within 5 degrees of oppsoite LE  Long term goal 5: Pt will report being able to perform all ADLs with significant increase in knee pain     Plan: [] Continue per plan of care [] Alter current plan (see comments)   [x] Plan of care initiated [] Hold pending MD visit [] Discharge      Plan for Next Session:      Re-Certification Due Date:         Signature:   Juanita Guaman, PT

## 2022-01-11 NOTE — PROGRESS NOTES
Physical Therapy  Initial Assessment  Date: 2022  Patient Name: Clem Walker  MRN: [id number]  : 1955     Treatment Diagnosis: Knee Pain    Restrictions   No LATEX Allergeis     Subjective   General  Chart Reviewed: Yes  Patient assessed for rehabilitation services?: Yes  Family / Caregiver Present: No  Referring Practitioner: Saeid Hebert DO  Referral Date : 12/10/21  Diagnosis: M25.562, G89.29 (ICD-10-CM) - Chronic pain of left knee  PT Visit Information  PT Insurance Information: Medicare  Subjective  Subjective: Pt has had therapy for knee before for meniscus. Pt is here for his L knee. Pt said his L knee is paindul and he has decreased ROM. Pt said he has issues with it hurting as it gets worse when pt is on his feet. No pain with sitting. However, if he sits too long pt has to stand for a while to get it moving. Pt said he has been dealing with this for months. Pt said it has been getting worse. Pt said he has been doing some home therapy. Pt has been doing heel slides, leg lifts, quad sets. Pt said since doing those exercises (1 month) he said it has been getting a little better. Pt can stand for about 20 minutes to 30 minutes before pain causes him standing. Pt said he can make it through the grocery store, but by the end he said it is painful. Pt said he got an x-ray taken the day he went to the MD and there has mild to moderate arthritis in L knee. Pt said he does have some pain in other knee. Pain is about 2-3/10 currently. Pain gets up to 8/10 at worst. Pt said he has trouble bending knee to get in his truck when he is hurting. Pt is retired . Pt is big in [employer] and would like to get back to that without knee pain. Pt also takes care of grandchildren often and would like to get back to playing with them without pain. Pt has stairs and performs them with step to pattern. Pt has tri level and has 6 steps to top and bottom floor.  Pt goes back to see MD for normal check up in may/june. Pt said ice makes it a little better. Pain can sometimes be a stabbing pain. Most of the time is an ache. 1-2 knee buckling in the last couple years. No falls. No allergies to latex     C-SSRS Triggered by Intake questionnaire (Past 2 wk assessment):   [x] No, Questionnaire did not trigger screening.   [] Yes, Patient intake triggered further evaluation      [] C-SSRS Screening completed  [] PCP notified via Plan of Care  [] Emergency services notifiedVision/Hearing     Orientation  Orientation  Overall Orientation Status: Within Normal Limits      Objective     Observation/Palpation  Posture: Fair  Palpation: TTP over medial knee, distal quad, and medial hamstrings  Observation: Sitting: rounded shoulder, forward head posture, posterior pelvic tilt. Standing, R lateral lean, bilateral LE ER, wide JEISON, decresaed L knee extension. Ambulation: antalgic gait pattern. Decreased step length on R, decreased stance time on L, trendelenburg L>R, decreased hip extension, decreased trunk rotation, wide JEISON. Body Mechanics: squat: quad dominant, tibial ER, limited by pain  Edema: no swelling noted    AROM RLE (degrees)  RLE General AROM: Knee Flexion: 117 degrees. Knee Extension:-3 degrees, 75% impairemnt in IR  AROM LLE (degrees)  LLE General AROM: Knee Flexion: 95 degrees.  Knee Extension: -10 degrees, 75% impairment in IR    Strength RLE  Strength RLE: Exception  R Hip Flexion: 4/5  R Hip ABduction: 3+/5  R Hip Internal Rotation: 3+/5  R Hip External Rotation: 3+/5  R Knee Flexion: 4+/5  R Knee Extension: 4+/5  R Ankle Dorsiflexion: 4/5  R Ankle Plantar flexion: 4/5  R Great Toe Extension: 4/5  Strength LLE  Strength LLE: Exception  L Hip Flexion: 4-/5  L Hip ABduction: 3+/5  L Hip Internal Rotation: 3+/5  L Hip External Rotation: 3+/5  L Knee Flexion: 4/5  L Knee Extension: 4-/5  L Ankle Dorsiflexion: 4/5  L Ankle Plantar Flexion: 4/5  L Great Toe Extension: 4/5     Additional Measures  Flexibility: Moderate hamstring tightness  Other: clonus (-), Babinaki (-), DTR: (hyporreflexive bilateral achilles and patella)  Sensation  Overall Sensation Status: WNL  Bed mobility  Bridging: Independent  Rolling to Left: Independent  Rolling to Right: Independent  Supine to Sit: Independent  Sit to Supine: Independent  Transfers  Sit to Stand: Independent  Stand to sit: Independent  Bed to Chair: Independent  Stand Pivot Transfers: Independent         Assessment   Conditions Requiring Skilled Therapeutic Intervention  Body structures, Functions, Activity limitations: Decreased functional mobility ; Increased pain;Decreased posture;Decreased balance;Decreased ADL status; Decreased ROM; Decreased strength  Assessment: Pt is a 75 y/o male that presents to [facility] with chronic knee pain that has been getting worse over the last 2-3 months. Pt presents with significantly impaired limbopelvic strength, mucle firing patterns, and gait mechanics as a result of his L knee pain. Pt also presents wtih impaired tibiofemoral mobility, knee ROM, and hip ROM. Pt would benefit from skilled physical therpay in order to improve impairments and progress toward goals.   Treatment Diagnosis: Knee Pain  Prognosis: Fair  Decision Making: Low Complexity  REQUIRES PT FOLLOW UP: Yes         Plan   Plan  Times per week: 2x/week for 4 weeks  Plan weeks: 4 weeks  Current Treatment Recommendations: Emily Maldonado Re-education,Home Exercise Program,ROM,Manual Therapy - Soft Tissue Mobilization,Balance Training,Endurance Training,Functional Mobility Training,Transfer Training,Gait Training,Modalities,Pain Management,Stair training,ADL/Self-care Training    G-Code   LEFS: 39/80    Goals  Long term goals  Time Frame for Long term goals : 4 weeks  Long term goal 1: Pt will be independent and compliant with HEP  Long term goal 2: Pt will improve gross LE strength by 1/3 MMT  Long term goal 3: Pt will report at least 25% improvement in knee pain  Long term goal 4: Pt will improve R knee flexion to within 5 degrees of oppsoite LE  Long term goal 5: Pt will report being able to perform all ADLs with significant increase in knee pain       Therapy Time   Individual Concurrent Group Co-treatment   Time In  10:45         Time Out  11:27         Minutes  Total: 42 min          Timed 10 min        Carin Quarles PT          Outpatient Physical Therapy  Phone: [phone] Fax: [phone]     To: Acacia Harper DO        From: Carin Quarles PT, PT     Patient: Akhil Mckenna         : 1955  Diagnosis: M25.562, G89.29 (ICD-10-CM) - Chronic pain of left knee  Date: 2022  Treatment Diagnosis:Knee Pain       Physical Therapy Certification/Re-Certification Form  Dear  H31.694, G89.29 (ICD-10-CM) - Chronic pain of left knee   The following patient has been evaluated for physical therapy services and for therapy to continue, Medicare requires monthly physician review of the treatment plan. Please review the attached evaluation and/or summary of the patient's plan of care, and verify that you agree therapy should continue by signing the attached document and sending it back to our office. Plan of Care/Treatment to date:  [x] Therapeutic Exercise   [x] Modalities:  [x] Therapeutic Activity     [] Ultrasound  [] Electrical Stimulation   [x] Gait Training      [] Cervical Traction [] Lumbar Traction  [x] Neuromuscular Re-education  [] Hot/Coldpack [] Iontophoresis    [x] Instruction in HEP      Other:  [x] Manual Therapy       []                        [] Aquatic Therapy       []                      ? Frequency/Duration:  # Days per week: [] 1 day # Weeks: [] 1 week [] 5 weeks      [x] 2 days?    [] 2 weeks [] 6 weeks     [] 3 days   [] 3 weeks [] 7 weeks     [] 4 days   [x] 4 weeks [] 8 weeks    Rehab Potential: [] Excellent [] Good [x] Fair  [] Poor       Electronically signed by:  Carin Quarles PT, PT      If you have any questions or concerns, please don't hesitate to call.   Thank you for your referral.    Physician Signature:________________________________Date:__________________  By signing above, therapists plan is approved by physician

## 2022-01-18 ENCOUNTER — INITIAL CONSULT (OUTPATIENT)
Dept: SURGERY | Age: 67
End: 2022-01-18
Payer: MEDICARE

## 2022-01-18 VITALS
HEART RATE: 130 BPM | DIASTOLIC BLOOD PRESSURE: 70 MMHG | TEMPERATURE: 97.6 F | HEIGHT: 74 IN | SYSTOLIC BLOOD PRESSURE: 109 MMHG | BODY MASS INDEX: 40.3 KG/M2 | WEIGHT: 314 LBS

## 2022-01-18 DIAGNOSIS — M62.08 DIASTASIS RECTI: Primary | ICD-10-CM

## 2022-01-18 PROCEDURE — 1036F TOBACCO NON-USER: CPT | Performed by: SURGERY

## 2022-01-18 PROCEDURE — 1123F ACP DISCUSS/DSCN MKR DOCD: CPT | Performed by: SURGERY

## 2022-01-18 PROCEDURE — G8427 DOCREV CUR MEDS BY ELIG CLIN: HCPCS | Performed by: SURGERY

## 2022-01-18 PROCEDURE — G8417 CALC BMI ABV UP PARAM F/U: HCPCS | Performed by: SURGERY

## 2022-01-18 PROCEDURE — G8484 FLU IMMUNIZE NO ADMIN: HCPCS | Performed by: SURGERY

## 2022-01-18 PROCEDURE — 3017F COLORECTAL CA SCREEN DOC REV: CPT | Performed by: SURGERY

## 2022-01-18 PROCEDURE — 4040F PNEUMOC VAC/ADMIN/RCVD: CPT | Performed by: SURGERY

## 2022-01-18 PROCEDURE — 99203 OFFICE O/P NEW LOW 30 MIN: CPT | Performed by: SURGERY

## 2022-01-18 NOTE — PROGRESS NOTES
New Patient [address] [place] [address] Surgery Leni ZAMBRANO. [address] [facility], [address] [facility], [address], [address]  Phone: [phone]  Fax: [phone]   YOB: 1955    Date of Visit:  1/18/2022    [address] [place] [address]    HPI:   Hernia: Patient is 74y.o. year old male seen at request of [address] [place] [address]. Patient presents for evaluation of midline abominal bulge. Symptoms were first noted 2 months ago. Pain is not present. There is a bulge present with Valsalva. Pt does not have risk factors of chronic constipation, chronic cough, difficulty urinating, chronic tobacco abuse. Pt. has previous history of prior ventral hernia surgery, performed 10-15 yrs ago with mesh placed. Pt has some nausea and bloating in the mornings. Pt wears CPAP machine at night and correlates these symptoms to the CPAP machine. Pt denies any N/V/D or changes to bowel movements in recent months. Pt has lost 20 lbs over the last year due to decreasing appetite. Has been eating less with each meal. Denies any fevers/chills, chest pain, SOB, or other symptoms.       No Known Allergies  Outpatient Medications Marked as Taking for the 1/18/22 encounter (Initial consult) with Deana Mcmahon MD   Medication Sig Dispense Refill    ondansetron (ZOFRAN ODT) 4 MG disintegrating tablet Take 1 tablet by mouth every 8 hours as needed for Nausea or Vomiting 30 tablet 1    lisinopril (PRINIVIL;ZESTRIL) 20 MG tablet TAKE 1 TABLET BY MOUTH TWICE A  tablet 3    pravastatin (PRAVACHOL) 40 MG tablet TAKE 1 TABLET BY MOUTH EVERY DAY 90 tablet 3    isosorbide mononitrate (IMDUR) 30 MG extended release tablet Take once per day 90 tablet 3    metoprolol succinate (TOPROL XL) 50 MG extended release tablet TAKE 1 TABLET BY MOUTH TWICE A  tablet 3    hydrALAZINE (APRESOLINE) 25 MG tablet TAKE 1 TABLET BY MOUTH TWICE A  tablet 3    rivaroxaban (XARELTO) 20 MG TABS tablet TAKE 1 TABLET BY MOUTH EVERY DAY 90 tablet 3    furosemide (LASIX) 40 MG tablet TAKE 1 TABLET BY MOUTH EVERY DAY IN THE MORNING 90 tablet 3    eplerenone (INSPRA) 25 MG tablet TAKE 1 TABLET BY MOUTH EVERY DAY 30 tablet 5    hydrOXYzine (ATARAX) 25 MG tablet Take 1 tablet by mouth every 8 hours as needed for Anxiety 30 tablet 2    melatonin (RA MELATONIN) 3 MG TABS tablet Take 1.5 tablets by mouth daily 60 tablet 3    fluticasone (FLONASE) 50 MCG/ACT nasal spray 2 sprays by Each Nostril route daily 1 Bottle 5       Past Medical History:   Diagnosis Date    Atrial fibrillation (HCC)     Cardiomyopathy ([facility] 07/2019    EF= 35-40%    Chest pain 07/2019    CHF (congestive heart failure) (HCC)     CKD (chronic kidney disease) stage 3, GFR 30-59 ml/min (HCC)     has only one functioning kidney    Family history of early CAD     Hyperlipidemia     Hypertension     JESSICA (obstructive sleep apnea)     uses CPAP    S/P ablation of atrial fibrillation 8/11/2015,11-25-19    [facility]CA     Past Surgical History:   Procedure Laterality Date    ABLATION OF DYSRHYTHMIC FOCUS  08/17/2015    CHARLY/ Atrial Fib Ablation    ABLATION OF DYSRHYTHMIC FOCUS  11/25/2019    for Atrial Fib    BACK SURGERY      CARDIAC CATHETERIZATION  07/25/2019    Non- Ischemic Cardiomyopathy    CARDIOVERSION  03/23/2015    Atrial Fib to SR    CARDIOVERSION  01/10/2017    A Fib- SR    COLONOSCOPY N/A 1/30/2020    COLONOSCOPY W/ ANESTHESIA -SLEEP APNEA- performed by Lacy Jones MD at [address]  1/30/2020    COLONOSCOPY WITH BIOPSY performed by Lacy Jones MD at [address] [facility] N/A 1/30/2020    COLONOSCOPY POLYPECTOMY SNARE/COLD BIOPSY performed by Lacy Jones MD at [address] [facility]      umbilical    KNEE ARTHROSCOPY Left 03/20/2012    Left    TRANSESOPHAGEAL ECHOCARDIOGRAM  8/11/2015    TRANSESOPHAGEAL ECHOCARDIOGRAM  01/10/2017 Family History   Problem Relation Age of Onset    High Blood Pressure Mother     Heart Disease Mother     Diabetes Mother     Osteoarthritis Mother     Cancer Father     Heart Disease Sister     Diabetes Brother      Social History     Socioeconomic History    Marital status:      Spouse name: Not on file    Number of children: Not on file    Years of education: 15    Highest education level: Not on file   Occupational History    Occupation:      Employer: [employer]   Tobacco Use    Smoking status: Never Smoker    Smokeless tobacco: Never Used   Vaping Use    Vaping Use: Never used   Substance and Sexual Activity    Alcohol use: No     Alcohol/week: 0.0 standard drinks    Drug use: No    Sexual activity: Not Currently     Partners: Female   Other Topics Concern    Not on file   Social History Narrative    Not on file     Social Determinants of Health     Financial Resource Strain: Low Risk     Difficulty of Paying Living Expenses: Not hard at all   Food Insecurity: No Food Insecurity    Worried About [address] [employer] in the Last Year: Never true    [facility] in the Last Year: Never true   Transportation Needs: No Transportation Needs    Lack of Transportation (Medical): No    Lack of Transportation (Non-Medical):  No   Physical Activity:     Days of Exercise per Week: Not on file    Minutes of Exercise per Session: Not on file   Stress:     Feeling of Stress : Not on file   Social Connections:     Frequency of Communication with Friends and Family: Not on file    Frequency of Social Gatherings with Friends and Family: Not on file    Attends [employer]ism Services: Not on file    Active Member of Clubs or Organizations: Not on file    Attends Club or Organization Meetings: Not on file    Marital Status: Not on file   Intimate Partner Violence:     Fear of Current or Ex-Partner: Not on file    Emotionally Abused: Not on file    Physically Abused: Not on file    Sexually Abused: Not on file   Housing Stability:     Unable to Pay for Housing in the Last Year: Not on file    Number of Places Lived in the Last Year: Not on file    Unstable Housing in the Last Year: Not on file          A review of the patient's record including allergies, medication list, tobacco history, family history, problem list, medical history and social history has been completed and updates made to the patient's EMR where indicated. Vitals:    01/18/22 1312   BP: 109/70   Site: Left Wrist   Position: Sitting   Cuff Size: Medium Adult   Pulse: 130   Temp: 97.6 °F (36.4 °C)   Weight: (!) 314 lb (142.4 kg)   Height: 6' 2.02\" (1.88 m)     Body mass index is 40.3 kg/m². Wt Readings from Last 3 Encounters:   01/18/22 (!) 314 lb (142.4 kg)   01/06/22 (!) 315 lb (142.9 kg)   12/13/21 (!) 319 lb 12.8 oz (145.1 kg)     BP Readings from Last 3 Encounters:   01/18/22 109/70   01/06/22 112/74   12/13/21 (!) 146/88          REVIEW OF SYSTEMS:   · All other systems reviewed; please refer to HPI with pertinent positives, all other ROS are negative    PHYSICAL EXAM:    CONSTITUTIONAL:  awake, alert, no apparent distress and morbidly obese  ENT:  normocepalic, without obvious abnormality  NECK:  supple, symmetrical, trachea midline   LUNGS:  Resp easy and unlabored  CARDIOVASCULAR:  regular rate and rhythm  ABDOMEN:  scars noted umbilical, normal bowel sounds, soft, non-distended, non-tender, voluntary guarding absent,  Examination for hernias: midline bulge present with Valsalva. MUSCULOSKELETAL: No edema  NEUROLOGIC:  Mental Status Exam:  Level of Alertness:   awake  Orientation:   person, place, time      DATA:  Radiology Review:  CT abdomen pelvis 6/22/2020 imaging reviewed. ASSESSMENT:     Diagnosis Orders   1. Diastasis recti           PLAN:  Counseled patient on diagnosis of diastasis recti. No routine f/u necessary.  Instructed patient to return if new abdominal bulge is present or if new symptoms of pain, non-reducible bulge, nausea/vomiting/diarrhea present.          Lionel Ovalles, DO  1/18/2022

## 2022-01-18 NOTE — Clinical Note
Alex -you are correct, this is just a diastasis which does not require surgical intervention.   He was comfortable with the discussion and will follow up as needed

## 2022-01-21 ENCOUNTER — APPOINTMENT (OUTPATIENT)
Dept: PHYSICAL THERAPY | Age: 67
End: 2022-01-21
Payer: MEDICARE

## 2022-01-25 ENCOUNTER — HOSPITAL ENCOUNTER (OUTPATIENT)
Dept: PHYSICAL THERAPY | Age: 67
Setting detail: THERAPIES SERIES
Discharge: HOME | End: 2022-01-25
Payer: MEDICARE

## 2022-01-25 PROCEDURE — 97110 THERAPEUTIC EXERCISES: CPT

## 2022-01-25 PROCEDURE — 97140 MANUAL THERAPY 1/> REGIONS: CPT

## 2022-01-25 NOTE — FLOWSHEET NOTE
[facility]  [facility], [facility], [facility]  Phone: [phone]  Fax [phone]    Physical Therapy Daily Treatment Note    Date:  2022    Patient Name:  Claudia Little    :  1955  MRN: [id number]  Restrictions/Precautions:    Medical/Treatment Diagnosis Information:  · Diagnosis: M25.562, G89.29 (ICD-10-CM) - Chronic pain of left knee  Insurance/Certification information:  PT Insurance Information: Medicare  Physician Information:  Referring Practitioner: Randy Chanel DO  Plan of care signed (Y/N):  Y  Visit# / total visits:  3/8     G-Code (if applicable):          LEFS:     Medicare Cap (if applicable):   353= total amount used, updated 2022    Time in:   9:10      Timed Treatment: 45 Total Treatment Time:  45  Time out: 9:55  ________________________________________________________________________________________    Pain Level:    /10  SUBJECTIVE:  Pt said he is doing okay today. Had some bad pain on the outside of his knee a couple days ago that went away after going to bed.      OBJECTIVE:     Exercise/Equipment Resistance/Repetitions Other comments   Nu-step 5 min seat 15 lvl 3           Quad set  10\"x10      Heel slide x15 On ball      SLR x15      Hooklying clam   Single leg ER x15  x10B       SL hip abduction  x15B      LAQ x15       Seated HR x20                                                                                             Other Therapeutic Activities: HEP: [id number]    Manual Treatments:       Patellar mobs grade 3-4  PA and AP tibiofemoral mobs  LAD  Leg roll   STM to distal quad     Modalities:      Test/Measurements:         ASSESSMENT:  See Initial Evaluation        Treatment/Activity Tolerance:   [x]Patient tolerated treatment well [] Patient limited by fatique  []Patient limited by pain [] Patient limited by other medical complications  [] Other:     Goals:        Long term goals  Time Frame for Long term goals : 4 weeks  Long term goal 1: Pt will be independent and compliant with HEP  Long term goal 2: Pt will improve gross LE strength by 1/3 MMT  Long term goal 3: Pt will report at least 25% improvement in knee pain  Long term goal 4: Pt will improve R knee flexion to within 5 degrees of oppsoite LE  Long term goal 5: Pt will report being able to perform all ADLs with significant increase in knee pain     Plan: [] Continue per plan of care [] Alter current plan (see comments)   [x] Plan of care initiated [] Hold pending MD visit [] Discharge      Plan for Next Session:      Re-Certification Due Date:         Signature:   Belia Jennings PT

## 2022-01-27 ENCOUNTER — HOSPITAL ENCOUNTER (OUTPATIENT)
Dept: PHYSICAL THERAPY | Age: 67
Setting detail: THERAPIES SERIES
Discharge: HOME | End: 2022-01-27
Payer: MEDICARE

## 2022-01-27 PROCEDURE — 97110 THERAPEUTIC EXERCISES: CPT

## 2022-01-27 PROCEDURE — 97140 MANUAL THERAPY 1/> REGIONS: CPT

## 2022-01-27 NOTE — FLOWSHEET NOTE
[facility] and Therapy, [facility], [address], [address]   Phone: [phone]  Fax [phone]    Physical Therapy Daily Treatment Note    Date:  2022    Patient Name:  Cheyenne Padilla    :  1955  MRN: [id number]  Restrictions/Precautions:    Medical/Treatment Diagnosis Information:  · Diagnosis: M25.562, G89.29 (ICD-10-CM) - Chronic pain of left knee  Insurance/Certification information:  PT Insurance Information: Medicare  Physician Information:  Referring Practitioner: Karsten Mccauley DO  Plan of care signed (Y/N):  Y  Visit# / total visits:       G-Code (if applicable):          LEFS: 39    Medicare Cap (if applicable):   666= total amount used, updated 2022    Time in:   10:05     Timed Treatment: 50 Total Treatment Time:  50  Time out: 11:55  ________________________________________________________________________________________    Pain Level:    /10  SUBJECTIVE:  Pt said he is doing okay today. Had some bad pain on the outside of his knee a couple days ago that went away after going to bed.      OBJECTIVE:     Exercise/Equipment Resistance/Repetitions Other comments   Nu-step 5 min seat 15 lvl 3           Quad set  10\"x10      Heel slide c rope 5\"x15 On ball      SLR x15      Hooklying clam   Single leg ER x15  x10B       SL hip abduction  x15B      LAQ x15       Seated HR x20                                                                                             Other Therapeutic Activities: HEP: [id number]    Manual Treatments:       Patellar mobs grade 3-4  PA and AP tibiofemoral mobs  LAD  Leg roll   STM to distal quad     Modalities:      Test/Measurements:         ASSESSMENT:  See Initial Evaluation        Treatment/Activity Tolerance:   [x]Patient tolerated treatment well [] Patient limited by fatique  []Patient limited by pain [] Patient limited by other medical complications  [] Other:     Goals: Long term goals  Time Frame for Long term goals : 4 weeks  Long term goal 1: Pt will be independent and compliant with HEP  Long term goal 2: Pt will improve gross LE strength by 1/3 MMT  Long term goal 3: Pt will report at least 25% improvement in knee pain  Long term goal 4: Pt will improve R knee flexion to within 5 degrees of oppsoite LE  Long term goal 5: Pt will report being able to perform all ADLs with significant increase in knee pain     Plan: [] Continue per plan of care [] Alter current plan (see comments)   [x] Plan of care initiated [] Hold pending MD visit [] Discharge      Plan for Next Session:      Re-Certification Due Date:         Signature:   Juanita Guaman PT

## 2022-02-01 ENCOUNTER — HOSPITAL ENCOUNTER (OUTPATIENT)
Dept: PHYSICAL THERAPY | Age: 67
Setting detail: THERAPIES SERIES
Discharge: HOME | End: 2022-02-01
Payer: MEDICARE

## 2022-02-01 PROCEDURE — 97140 MANUAL THERAPY 1/> REGIONS: CPT

## 2022-02-01 PROCEDURE — 97110 THERAPEUTIC EXERCISES: CPT

## 2022-02-01 NOTE — FLOWSHEET NOTE
[facility], [facility], [address]  Phone: [phone]  Fax [phone]    Physical Therapy Daily Treatment Note    Date:  2022    Patient Name:  Roney Esposito    :  1955  MRN: [id number]  Restrictions/Precautions:    Medical/Treatment Diagnosis Information:  · Diagnosis: M25.562, G89.29 (ICD-10-CM) - Chronic pain of left knee  Insurance/Certification information:  PT Insurance Information: Medicare  Physician Information:  Referring Practitioner: Durward Bumpers, DO  Plan of care signed (Y/N):  Y  Visit# / total visits:       G-Code (if applicable):          LEFS:     Medicare Cap (if applicable):   700= total amount used, updated 2022    Time in:   10:45     Timed Treatment: 45 Total Treatment Time:  45  Time out: 11:00  ________________________________________________________________________________________    Pain Level:    /10  SUBJECTIVE:  Pt said he is very sore throughout his whole body as he replaced the blade on his tractor. Pt said this took him 3-4 hours or consistent bending, twisting, and getting on/off ground.      OBJECTIVE:     Exercise/Equipment Resistance/Repetitions Other comments   Nu-step 5 min seat 15 lvl 3           Quad set  10\"x10      Heel slide c rope 5\"x15 On ball      SLR x15 LLE     SAQ x15 LLE      Hooklying clam   Single leg ER x15  x10B       SL hip abduction  x15B      LAQ    Hip flexion  x10   x10 2#  2#     Seated HR x20      Slant board 1 min x 2                                                                                      Other Therapeutic Activities: HEP: [id number]    Manual Treatments:       Patellar mobs grade 3-4  PA and AP tibiofemoral mobs  LAD  Leg roll   STM to distal quad     Modalities:      Test/Measurements:         ASSESSMENT:  See Initial Evaluation        Treatment/Activity Tolerance:   [x]Patient tolerated treatment well [] Patient limited by leela  []Patient limited by pain [] Patient limited by other medical complications  [] Other:     Goals:        Long term goals  Time Frame for Long term goals : 4 weeks  Long term goal 1: Pt will be independent and compliant with HEP  Long term goal 2: Pt will improve gross LE strength by 1/3 MMT  Long term goal 3: Pt will report at least 25% improvement in knee pain  Long term goal 4: Pt will improve R knee flexion to within 5 degrees of oppsoite LE  Long term goal 5: Pt will report being able to perform all ADLs with significant increase in knee pain     Plan: [x] Continue per plan of care [] Alter current plan (see comments)   [x] Plan of care initiated [] Hold pending MD visit [] Discharge      Plan for Next Session:      Re-Certification Due Date:         Signature:   Lashell Hunter, PT

## 2022-02-03 ENCOUNTER — HOSPITAL ENCOUNTER (OUTPATIENT)
Dept: PHYSICAL THERAPY | Age: 67
Setting detail: THERAPIES SERIES
End: 2022-02-03
Payer: MEDICARE

## 2022-02-08 ENCOUNTER — HOSPITAL ENCOUNTER (OUTPATIENT)
Dept: PHYSICAL THERAPY | Age: 67
Setting detail: THERAPIES SERIES
Discharge: HOME | End: 2022-02-08
Payer: MEDICARE

## 2022-02-08 PROCEDURE — 97110 THERAPEUTIC EXERCISES: CPT

## 2022-02-08 PROCEDURE — 97140 MANUAL THERAPY 1/> REGIONS: CPT

## 2022-02-08 NOTE — FLOWSHEET NOTE
[facility], [facility], [address] [facility], [address] [place] [address] Dr  Phone: [phone]  Fax [phone]    Physical Therapy Daily Treatment Note    Date:  2022    Patient Name:  Dash Darnell    :  1955  MRN: [id number]  Restrictions/Precautions:    Medical/Treatment Diagnosis Information:  · Diagnosis: M25.562, G89.29 (ICD-10-CM) - Chronic pain of left knee  Insurance/Certification information:  PT Insurance Information: Medicare  Physician Information:  Referring Practitioner: Becka Hill DO  Plan of care signed (Y/N):  Y  Visit# / total visits:       G-Code (if applicable):          LEFS: 3980    Medicare Cap (if applicable):   663= total amount used, updated 2022    Time in:   10:45     Timed Treatment: 50 Total Treatment Time:  50  Time out: 11:35  ________________________________________________________________________________________    Pain Level:    /10  SUBJECTIVE:  Pt said he is still very sore in his knees, back, hips, and L ankle. Pt said he thinks this is due to over exertion secondary to having to shovel his driveway and dealing with the ice.      OBJECTIVE:     Exercise/Equipment Resistance/Repetitions Other comments   Nu-step 5 min seat 15 lvl 3           Quad set  10\"x10      Heel slide c rope 5\"x15 On ball      SLR 2x10 BLE 2#    SAQ 2x10 BLE 2#     Hooklying clam   Single leg ER x15  x10B       SL hip abduction  x15B      LAQ    Hip flexion  2x10   2x10 2#  2#     Seated HR x20      Slant board 1 min x 2      Supine quad stretch 1 min x2 LLE hanging off table with green strap                                                                              Other Therapeutic Activities: HEP: [id number]    Manual Treatments:       Patellar mobs grade 3-4  PA and AP tibiofemoral mobs  LAD  Leg roll   STM to distal quad     Modalities:      Test/Measurements:         ASSESSMENT:  See Initial Evaluation Treatment/Activity Tolerance:   [x]Patient tolerated treatment well [] Patient limited by fatique  []Patient limited by pain [] Patient limited by other medical complications  [] Other:     Goals:        Long term goals  Time Frame for Long term goals : 4 weeks  Long term goal 1: Pt will be independent and compliant with HEP  Long term goal 2: Pt will improve gross LE strength by 1/3 MMT  Long term goal 3: Pt will report at least 25% improvement in knee pain  Long term goal 4: Pt will improve R knee flexion to within 5 degrees of oppsoite LE  Long term goal 5: Pt will report being able to perform all ADLs with significant increase in knee pain     Plan: [x] Continue per plan of care [] Alter current plan (see comments)   [x] Plan of care initiated [] Hold pending MD visit [] Discharge      Plan for Next Session:      Re-Certification Due Date:         Signature:   Lia Schaumann, PT

## 2022-02-10 ENCOUNTER — HOSPITAL ENCOUNTER (OUTPATIENT)
Dept: PHYSICAL THERAPY | Age: 67
Setting detail: THERAPIES SERIES
Discharge: HOME | End: 2022-02-10
Payer: MEDICARE

## 2022-02-10 PROCEDURE — 97110 THERAPEUTIC EXERCISES: CPT

## 2022-02-10 RX ORDER — EPLERENONE 25 MG/1
TABLET, FILM COATED ORAL
Qty: 90 TABLET | Refills: 1 | Status: SHIPPED | OUTPATIENT
Start: 2022-02-10 | End: 2022-08-18 | Stop reason: SDUPTHER

## 2022-02-10 NOTE — FLOWSHEET NOTE
[facility]  79. and Therapy, [facility], [address] [facility], [address] [place] [address] Dr  Phone: [phone]  Fax [phone]    Physical Therapy Daily Treatment Note    Date:  2/10/2022    Patient Name:  Maryam Sequeira    :  1955  MRN: [id number]  Restrictions/Precautions:    Medical/Treatment Diagnosis Information:  · Diagnosis: M25.562, G89.29 (ICD-10-CM) - Chronic pain of left knee  Insurance/Certification information:  PT Insurance Information: Medicare  Physician Information:  Referring Practitioner: Jaziel Bryant DO  Plan of care signed (Y/N):  Y  Visit# / total visits:       G-Code (if applicable):          LEFS:     Medicare Cap (if applicable):   205= total amount used, updated 2/10/2022    Time in:   10:45     Timed Treatment: 45 Total Treatment Time:  45  Time out: 11:30  ________________________________________________________________________________________    Pain Level:    /10  SUBJECTIVE:  Pt said he has now been having L foot/ankle pain that started the other day. Pt said this is bothering him the most and is affecting his walking. Started Wednesday when pt woke up.      OBJECTIVE:     Exercise/Equipment Resistance/Repetitions Other comments   Nu-step 5 min seat 15 lvl 3           Quad set  10\"x10      Heel slide c rope 5\"x15 On ball      SLR 2x10 BLE 2#    SAQ 2x10 BLE 2#     Hooklying clam   Single leg ER x15  x10B       SL hip abduction  x15B      LAQ    Hip flexion  x15  B  x10 B 2#  2#     Seated HR   Seated Toe raise x20  x20      Slant board 1 min x 2      Supine quad stretch 1 min x2 LLE hanging off table with green strap       Supine ankle pumps      Supine ankle circles x10  x10B                                                                       Other Therapeutic Activities: HEP: [id number]    Manual Treatments:           Modalities:      Test/Measurements:         ASSESSMENT:  See Initial Evaluation        Treatment/Activity Tolerance:   [x]Patient tolerated treatment well [] Patient limited by fatique  []Patient limited by pain [] Patient limited by other medical complications  [] Other:     Goals:        Long term goals  Time Frame for Long term goals : 4 weeks  Long term goal 1: Pt will be independent and compliant with HEP  Long term goal 2: Pt will improve gross LE strength by 1/3 MMT  Long term goal 3: Pt will report at least 25% improvement in knee pain  Long term goal 4: Pt will improve R knee flexion to within 5 degrees of oppsoite LE  Long term goal 5: Pt will report being able to perform all ADLs with significant increase in knee pain     Plan: [x] Continue per plan of care [] Alter current plan (see comments)   [] Plan of care initiated [] Hold pending MD visit [] Discharge      Plan for Next Session:      Re-Certification Due Date:         Signature:   Lia Schaumann, PT

## 2022-02-15 ENCOUNTER — HOSPITAL ENCOUNTER (OUTPATIENT)
Dept: PHYSICAL THERAPY | Age: 67
Setting detail: THERAPIES SERIES
Discharge: HOME | End: 2022-02-15
Payer: MEDICARE

## 2022-02-15 PROCEDURE — 97110 THERAPEUTIC EXERCISES: CPT

## 2022-02-15 NOTE — FLOWSHEET NOTE
[facility] 79. and Therapy, [facility], [address] [place] [address]  Phone: [phone]  Fax [phone]    Physical Therapy Daily Treatment Note    Date:  2/15/2022    Patient Name:  Josee Tam    :  1955  MRN: [id number]  Restrictions/Precautions:    Medical/Treatment Diagnosis Information:  · Diagnosis: M25.562, G89.29 (ICD-10-CM) - Chronic pain of left knee  Insurance/Certification information:  PT Insurance Information: Medicare  Physician Information:  Referring Practitioner: Jorge Sandhu DO  Plan of care signed (Y/N):  Y  Visit# / total visits:       G-Code (if applicable):          LEFS: 3980    Medicare Cap (if applicable):   180= total amount used, updated 2/15/2022    Time in:   10:45     Timed Treatment: 45 Total Treatment Time:  45  Time out: 11:30  ________________________________________________________________________________________    Pain Level:    /10  SUBJECTIVE:  Pt said his foot has gotten a little bit better. Pt said he thinks it is his gout that is flaring up.      OBJECTIVE:     Exercise/Equipment Resistance/Repetitions Other comments   Nu-step 5 min seat 15 lvl 3           Quad set  10\"x10      Heel slide c rope 10\"x10 On ball      SLR 2x10 BLE 2#    SAQ 2x10 BLE 2#     Hooklying clam   Single leg ER x20  x10B  Green   Green      SL hip abduction  x15B      LAQ    Hip flexion  x15  B  x10 B 2#  2#     Seated HR   Seated Toe raise x20  x20      Slant board 1 min x 2      Supine quad stretch 1 min x2 LLE hanging off table with green strap       Supine ankle pumps      Supine ankle circles x10  x10B               Mini squats x15      Standing knee flexion stretch  5\"x10  LLE                                               Other Therapeutic Activities: HEP: [id number]    Manual Treatments:           Modalities:      Test/Measurements:         ASSESSMENT:  See Initial Evaluation        Treatment/Activity Tolerance: [x]Patient tolerated treatment well [] Patient limited by fatique  []Patient limited by pain [] Patient limited by other medical complications  [] Other:     Goals:        Long term goals  Time Frame for Long term goals : 4 weeks  Long term goal 1: Pt will be independent and compliant with HEP  Long term goal 2: Pt will improve gross LE strength by 1/3 MMT  Long term goal 3: Pt will report at least 25% improvement in knee pain  Long term goal 4: Pt will improve R knee flexion to within 5 degrees of oppsoite LE  Long term goal 5: Pt will report being able to perform all ADLs with significant increase in knee pain     Plan: [x] Continue per plan of care [] Alter current plan (see comments)   [] Plan of care initiated [] Hold pending MD visit [] Discharge      Plan for Next Session:      Re-Certification Due Date:         Signature:   Og Alejo, PT

## 2022-02-17 ENCOUNTER — HOSPITAL ENCOUNTER (OUTPATIENT)
Dept: PHYSICAL THERAPY | Age: 67
Setting detail: THERAPIES SERIES
Discharge: HOME | End: 2022-02-17
Payer: MEDICARE

## 2022-02-17 PROCEDURE — 97110 THERAPEUTIC EXERCISES: CPT

## 2022-02-17 NOTE — FLOWSHEET NOTE
[facility], [facility], [address]  Phone: [phone]  Fax [phone]    Physical Therapy Daily Treatment Note    Date:  2022    Patient Name:  Fabian Bae    :  1955  MRN: [id number]  Restrictions/Precautions:    Medical/Treatment Diagnosis Information:  · Diagnosis: M25.562, G89.29 (ICD-10-CM) - Chronic pain of left knee  Insurance/Certification information:  PT Insurance Information: Medicare  Physician Information:  Referring Practitioner: Cachorro Rhodes DO  Plan of care signed (Y/N):  Y  Visit# / total visits:       G-Code (if applicable):          LEFS:   LEFS:     Medicare Cap (if applicable):   563= total amount used, updated 2022    Time in:   10:45     Timed Treatment: 45 Total Treatment Time:  45  Time out: 11:30  ________________________________________________________________________________________    Pain Level:    /10  SUBJECTIVE:  Pt said this has helped with his knee pain. Pt said he can move his knee a little better. Pt said he has made an overall 30-40% improvement. Pt said he is walking a little bit better. Pt said he still has issues bending his knee.  Pt reported that he would like     OBJECTIVE:     Exercise/Equipment Resistance/Repetitions Other comments   Nu-step 5 min seat 15 lvl 3           Quad set  10\"x10      Heel slide c rope 10\"x10 On ball      SLR 2x10 LLE 2#    SAQ 2x10 LLE 2#     Hooklying clam   Single leg ER x20  x10B  Green   Green      SL hip abduction  x15L      LAQ    Hip flexion  x15  L  x10 L 2#  2#     Seated HR   Seated Toe raise x20  x20      Slant board 1 min x 2      Supine quad stretch 1 min x2 LLE hanging off table with green strap       Supine ankle pumps      Supine ankle circles x10  x10B               Mini squats x15      Standing knee flexion stretch  5\"x10  LLE                                               Other Therapeutic Activities: HEP: [id number]    Manual Treatments:           Modalities:      Test/Measurements:     Strength RLE  Strength RLE: Exception  R Hip Flexion: 4/5  R Hip ABduction: 4-/5  R Hip Internal Rotation: 4-/5  R Hip External Rotation: 4-/5  R Knee Flexion: 4+/5  R Knee Extension: 4+/5  R Ankle Dorsiflexion: 4/5  R Ankle Plantar flexion: 4+/5  R Great Toe Extension: 4+/5  Strength LLE  Strength LLE: Exception  L Hip Flexion: 4/5  L Hip ABduction: 4-/5  L Hip Internal Rotation: 4-/5  L Hip External Rotation: 4-/5  L Knee Flexion: 4/5  L Knee Extension: 4+/5  L Ankle Dorsiflexion: 4+/5  L Ankle Plantar Flexion: 4+/5    Knee Flexion: 95 degrees. Knee Extension: -10 degrees       ASSESSMENT:  Pt has made progress this treatment period as pt has achieved 2/5 established goals and is making progress toward remaining goals. Pt has reported compliance and independence with HEP. Pt has reported overall 30-40% improvement in frequency and intensity of pain. Pt has made improvement in hip and LLE strength, but continues to demonstrate mild 4-/5 weakness in gross hip strength. Pt improved knee flexion from 95 degrees to 103 degrees. Pt has reported overall improved ability to perform ADLs and extracurricular activities with less knee pain. PT recommending pt continue to perform PT 2x/week for 4 weeks in order to continue to improve quad strength, hip strength, knee ROM, and maximize function at home/in the community.        Treatment/Activity Tolerance:   [x]Patient tolerated treatment well [] Patient limited by fatique  []Patient limited by pain [] Patient limited by other medical complications  [] Other:     Goals:        Long term goals  Time Frame for Long term goals : 4 weeks  Long term goal 1: Pt will be independent and compliant with HEP (Goal Met)   Long term goal 2: Pt will improve gross LE strength by 1/3 MMT (Progressing)  Long term goal 3: Pt will report at least 25% improvement in knee pain (Goal Met)   Long term goal 4: Pt will improve R knee flexion to within 5 degrees of oppsoite LE (Progressing)   Long term goal 5: Pt will report being able to perform all ADLs with significant increase in knee pain  (Progressing)     Plan: [x] Continue per plan of care [] Alter current plan (see comments)   [] Plan of care initiated [] Hold pending MD visit [] Discharge      Plan for Next Session:      Re-Certification Due Date:         Signature:   Reena Lowry PT

## 2022-02-17 NOTE — PROGRESS NOTES
Physical Therapy        Outpatient Physical Therapy  Phone: [phone] Fax: [phone]     To: Nyasia Bledsoe DO       From: Allegra Ontiveros PT, PT     Patient: Vincent Spain         : 1955  Diagnosis: M25.562, G89.29 (ICD-10-CM) - Chronic pain of left knee Date: 2022  Treatment Diagnosis:  Knee Pain     Physical Therapy Progress Note    Total Visits to date:   8 Cancels/No-shows to date:  0    Plan of Care/Treatment to date:  [x] Therapeutic Exercise    [] Modalities:  [x] Therapeutic Activity     [] Ultrasound   [] Electrical Stimulation   [] Gait Training      [] Cervical Traction   [] Lumbar Traction  [x] Neuromuscular Re-education  [] Cold/hotpack  [] Iontophoresis  [x] Instruction in HEP      Other:  [x] Manual Therapy       []                                 [] Aquatic Therapy       []                                     Significant Findings At Last Visit/Comments:    Pt has made progress this treatment period as pt has achieved 2/5 established goals and is making progress toward remaining goals. Pt has reported compliance and independence with HEP. Pt has reported overall 30-40% improvement in frequency and intensity of pain. Pt has made improvement in hip and LLE strength, but continues to demonstrate mild 4-/5 weakness in gross hip strength. Pt improved knee flexion from 95 degrees to 102 degrees. Pt has reported overall improved ability to perform ADLs and extracurricular activities with less knee pain. PT recommending pt continue to perform PT 2x/week for 4 weeks in order to continue to improve quad strength, hip strength, knee ROM, and maximize function at home/in the community.        Progress towards goals:    Long term goals  Time Frame for Long term goals : 4 weeks  Long term goal 1: Pt will be independent and compliant with HEP (Goal Met)   Long term goal 2: Pt will improve gross LE strength by 1/3 MMT (Progressing)  Long term goal 3: Pt will report at least 25% improvement in knee pain (Goal Met)   Long term goal 4: Pt will improve R knee flexion to within 5 degrees of oppsoite LE (Progressing)   Long term goal 5: Pt will report being able to perform all ADLs with significant increase in knee pain  (Progressing)     Frequency/Duration:  # Days per week: [] 1 day # Weeks: [] 1 week [x] 4 weeks      [x] 2 days   [] 2 weeks [] 5 weeks     [] 3 days   [] 3 weeks [] 6 weeks     Rehab Potential: [] Excellent [] Good [x] Fair  [] Poor     Goal Status:  [] Achieved [x] Partially Achieved  [] Not Achieved     Patient Status: [] Continue per initial plan of Care     [] Patient now discharged     [x] Additional visits requested, Please re-certify for additional visits:      Requested frequency/duration:  2 X/week for 4 weeks    Electronically signed by:  Reena Lowry PT, PT    If you have any questions or concerns, please don't hesitate to call.   Thank you for your referral.    Physician Signature:________________________________Date:__________________  By signing above, therapists plan is approved by physician

## 2022-02-22 ENCOUNTER — HOSPITAL ENCOUNTER (OUTPATIENT)
Dept: PHYSICAL THERAPY | Age: 67
Setting detail: THERAPIES SERIES
Discharge: HOME | End: 2022-02-22
Payer: MEDICARE

## 2022-02-22 PROCEDURE — 97110 THERAPEUTIC EXERCISES: CPT

## 2022-02-22 NOTE — FLOWSHEET NOTE
[facility] 79. and Therapy, [facility], [address] [place] [address]  Phone: [phone]  Fax [phone]    Physical Therapy Daily Treatment Note    Date:  2022    Patient Name:  Memo Wu    :  1955  MRN: [id number]  Restrictions/Precautions:    Medical/Treatment Diagnosis Information:  · Diagnosis: M25.562, G89.29 (ICD-10-CM) - Chronic pain of left knee  Insurance/Certification information:  PT Insurance Information: Medicare  Physician Information:  Referring Practitioner: Jhoana Flores DO  Plan of care signed (Y/N):  Y  Visit# / total visits:       G-Code (if applicable):          LEFS: 39  LEFS:     Medicare Cap (if applicable):   499= total amount used, updated 2022    Time in:   10:45     Timed Treatment: 45 Total Treatment Time:  45  Time out: 11:30  ________________________________________________________________________________________    Pain Level:    /10  SUBJECTIVE:  Pt said he is doing okay.  The bad weather has been affecting his knees     OBJECTIVE:     Exercise/Equipment Resistance/Repetitions Other comments   Nu-step 5 min seat 15 lvl 3           Quad set  10\"x10      Heel slide c rope 10\"x15 On ball      SLR 2x10 LLE 2#    SAQ 2x10 LLE 2#     Hooklying clam   Single leg ER x20  x10B  Green   Green      SL hip abduction  x15L      LAQ    Hip flexion  x15  L  x10 L 2#  2#     Seated HR   Seated Toe raise x20  x20      Slant board 1 min x 2      Supine quad stretch 3 minute LLLD LLE hanging off table with green strap       Supine ankle pumps      Supine ankle circles               Mini squats x15      Standing knee flexion stretch  10\"x10  LLE                                               Other Therapeutic Activities: HEP: [id number]    Manual Treatments:           Modalities:      Test/Measurements:     Strength RLE  Strength RLE: Exception  R Hip Flexion: 4/5  R Hip ABduction: 4-/5  R Hip Internal Rotation: 4-/5  R Hip External Rotation: 4-/5  R Knee Flexion: 4+/5  R Knee Extension: 4+/5  R Ankle Dorsiflexion: 4/5  R Ankle Plantar flexion: 4+/5  R Great Toe Extension: 4+/5  Strength LLE  Strength LLE: Exception  L Hip Flexion: 4/5  L Hip ABduction: 4-/5  L Hip Internal Rotation: 4-/5  L Hip External Rotation: 4-/5  L Knee Flexion: 4/5  L Knee Extension: 4+/5  L Ankle Dorsiflexion: 4+/5  L Ankle Plantar Flexion: 4+/5    Knee Flexion: 95 degrees. Knee Extension: -10 degrees       ASSESSMENT:  Tolerated TE well with minor increase in pain during stretching. Continues to be limited in knee flexion. Will progress as pt can tolerate. Treatment/Activity Tolerance:   [x]Patient tolerated treatment well [] Patient limited by fatique  []Patient limited by pain [] Patient limited by other medical complications  [] Other:     Goals:        Long term goals  Time Frame for Long term goals : 4 weeks  Long term goal 1: Pt will be independent and compliant with HEP (Goal Met)   Long term goal 2: Pt will improve gross LE strength by 1/3 MMT (Progressing)  Long term goal 3: Pt will report at least 25% improvement in knee pain (Goal Met)   Long term goal 4: Pt will improve R knee flexion to within 5 degrees of oppsoite LE (Progressing)   Long term goal 5: Pt will report being able to perform all ADLs with significant increase in knee pain  (Progressing)     Plan: [x] Continue per plan of care [] Alter current plan (see comments)   [] Plan of care initiated [] Hold pending MD visit [] Discharge      Plan for Next Session:      Re-Certification Due Date:         Signature:   Stacey Munoz, SIMONE

## 2022-02-24 ENCOUNTER — HOSPITAL ENCOUNTER (OUTPATIENT)
Dept: PHYSICAL THERAPY | Age: 67
Setting detail: THERAPIES SERIES
Discharge: HOME | End: 2022-02-24
Payer: MEDICARE

## 2022-02-24 PROCEDURE — 97110 THERAPEUTIC EXERCISES: CPT

## 2022-02-24 NOTE — FLOWSHEET NOTE
4-/5  R Hip Internal Rotation: 4-/5  R Hip External Rotation: 4-/5  R Knee Flexion: 4+/5  R Knee Extension: 4+/5  R Ankle Dorsiflexion: 4/5  R Ankle Plantar flexion: 4+/5  R Great Toe Extension: 4+/5  Strength LLE  Strength LLE: Exception  L Hip Flexion: 4/5  L Hip ABduction: 4-/5  L Hip Internal Rotation: 4-/5  L Hip External Rotation: 4-/5  L Knee Flexion: 4/5  L Knee Extension: 4+/5  L Ankle Dorsiflexion: 4+/5  L Ankle Plantar Flexion: 4+/5    Knee Flexion: 95 degrees. Knee Extension: -10 degrees       ASSESSMENT:  Tolerated TE well with minor increase in pain during stretching. Continues to be limited in knee flexion. Will progress as pt can tolerate. Treatment/Activity Tolerance:   [x]Patient tolerated treatment well [] Patient limited by fatique  []Patient limited by pain [] Patient limited by other medical complications  [] Other:     Goals:        Long term goals  Time Frame for Long term goals : 4 weeks  Long term goal 1: Pt will be independent and compliant with HEP (Goal Met)   Long term goal 2: Pt will improve gross LE strength by 1/3 MMT (Progressing)  Long term goal 3: Pt will report at least 25% improvement in knee pain (Goal Met)   Long term goal 4: Pt will improve R knee flexion to within 5 degrees of oppsoite LE (Progressing)   Long term goal 5: Pt will report being able to perform all ADLs with significant increase in knee pain  (Progressing)     Plan: [x] Continue per plan of care [] Alter current plan (see comments)   [] Plan of care initiated [] Hold pending MD visit [] Discharge      Plan for Next Session:      Re-Certification Due Date:         Signature:   Memo Herrera, PT

## 2022-03-03 ENCOUNTER — HOSPITAL ENCOUNTER (OUTPATIENT)
Dept: PHYSICAL THERAPY | Age: 67
Setting detail: THERAPIES SERIES
Discharge: HOME | End: 2022-03-03
Payer: MEDICARE

## 2022-03-03 PROCEDURE — 97110 THERAPEUTIC EXERCISES: CPT

## 2022-03-03 NOTE — FLOWSHEET NOTE
Ricky  [facility]. and Therapy, [facility] [address]IRLEN[address], [address]   Phone: [phone]  Fax [phone]    Physical Therapy Daily Treatment Note    Date:  3/3/2022    Patient Name:  Brandi Aaron    :  1955  MRN: [id number]  Restrictions/Precautions:    Medical/Treatment Diagnosis Information:  · Diagnosis: M25.562, G89.29 (ICD-10-CM) - Chronic pain of left knee  Insurance/Certification information:  PT Insurance Information: Medicare  Physician Information:  Referring Practitioner: Zia Lopez DO  Plan of care signed (Y/N):  Y  Visit# / total visits:  10/16     G-Code (if applicable):          LEFS:   LEFS:     Medicare Cap (if applicable):   949 = total amount used, updated 3/3/2022    Time in:   1:45     Timed Treatment: 45 (3 TE) Total Treatment Time:  45  Time out: 2:30  ________________________________________________________________________________________    Pain Level:    /10  SUBJECTIVE:  Pt said he is okay today.  Nothing new to report     OBJECTIVE:     Exercise/Equipment Resistance/Repetitions Other comments   Nu-step 5 min seat 15 lvl 3           Quad set  10\"x10      Heel slide c rope 10\"x15 On ball      SLR 2x10 LLE 4#    SAQ 2x10 L 4#     Hooklying clam   Single leg ER x20  x10B  Green   Green      SL hip abduction  x15L      LAQ    Hip flexion  x15  L  x10 L 4#  4#     Seated HR   Seated Toe raise x20  x20      Slant board 1 min x 2      Supine quad stretch 4 minute LLLD LLE hanging off table with green strap       Supine ankle pumps      Supine ankle circles  4#  4#             Mini squats 2x10      Standing knee flexion stretch  10\"x15  LLE     FSU x15      TKE 5\"x20      Lateral band walking 2 laps                            Other Therapeutic Activities: HEP: [id number]    Manual Treatments:           Modalities:      Test/Measurements:     Strength RLE  Strength RLE: Exception  R Hip Flexion: 4/5  R Hip ABduction: 4-/5  R Hip Internal Rotation: 4-/5  R Hip External Rotation: 4-/5  R Knee Flexion: 4+/5  R Knee Extension: 4+/5  R Ankle Dorsiflexion: 4/5  R Ankle Plantar flexion: 4+/5  R Great Toe Extension: 4+/5  Strength LLE  Strength LLE: Exception  L Hip Flexion: 4/5  L Hip ABduction: 4-/5  L Hip Internal Rotation: 4-/5  L Hip External Rotation: 4-/5  L Knee Flexion: 4/5  L Knee Extension: 4+/5  L Ankle Dorsiflexion: 4+/5  L Ankle Plantar Flexion: 4+/5    Knee Flexion: 95 degrees. Knee Extension: -10 degrees       ASSESSMENT:  Tolerated TE well with minor increase in pain during stretching. Continues to be limited in knee flexion. Will progress as pt can tolerate. Treatment/Activity Tolerance:   [x]Patient tolerated treatment well [] Patient limited by fatique  []Patient limited by pain [] Patient limited by other medical complications  [] Other:     Goals:        Long term goals  Time Frame for Long term goals : 4 weeks  Long term goal 1: Pt will be independent and compliant with HEP (Goal Met)   Long term goal 2: Pt will improve gross LE strength by 1/3 MMT (Progressing)  Long term goal 3: Pt will report at least 25% improvement in knee pain (Goal Met)   Long term goal 4: Pt will improve R knee flexion to within 5 degrees of oppsoite LE (Progressing)   Long term goal 5: Pt will report being able to perform all ADLs with significant increase in knee pain  (Progressing)     Plan: [x] Continue per plan of care [] Alter current plan (see comments)   [] Plan of care initiated [] Hold pending MD visit [] Discharge      Plan for Next Session:      Re-Certification Due Date:         Signature:   Dominguez Leger PT

## 2022-03-08 ENCOUNTER — HOSPITAL ENCOUNTER (OUTPATIENT)
Dept: PHYSICAL THERAPY | Age: 67
Setting detail: THERAPIES SERIES
Discharge: HOME | End: 2022-03-08
Payer: MEDICARE

## 2022-03-08 PROCEDURE — 97110 THERAPEUTIC EXERCISES: CPT

## 2022-03-08 NOTE — FLOWSHEET NOTE
[facility], [facility], [address] [place] [address]  Phone: [phone]  Fax [phone]    Physical Therapy Daily Treatment Note    Date:  3/8/2022    Patient Name:  Ailyn Dumont    :  1955  MRN: [id number]  Restrictions/Precautions:    Medical/Treatment Diagnosis Information:  · Diagnosis: M25.562, G89.29 (ICD-10-CM) - Chronic pain of left knee  Insurance/Certification information:  PT Insurance Information: Medicare  Physician Information:  Referring Practitioner: Bella Singh DO  Plan of care signed (Y/N):  Y  Visit# / total visits:  10/16     G-Code (if applicable):          LEFS:   LEFS:     Medicare Cap (if applicable):   0268 = total amount used, updated 3/8/2022    Time in:   12:15     Timed Treatment: 43 (3 TE) Total Treatment Time:  43  Time out: 12:58  ________________________________________________________________________________________    Pain Level:    /10  SUBJECTIVE:  Pt said he is okay today.  Nothing new to report     OBJECTIVE:     Exercise/Equipment Resistance/Repetitions Other comments   Nu-step 5 min seat 15 lvl 3           Quad set  10\"x10      Heel slide c rope 10\"x10 On ball      SLR 2x10 LLE 4#    SAQ 2x10 L 4#     Hooklying clam   Single leg ER x20  x10B  Green   Green      SL hip abduction  x15L      LAQ    Hip flexion  x15  L  x10 L 4#  4#     Seated HR   Seated Toe raise x20  x20      Slant board 1 min x 2      Supine quad stretch 4 minute LLLD LLE hanging off table with green strap       Supine ankle pumps      Supine ankle circles  4#  4#             Mini squats 2x10      Standing knee flexion stretch  10\"x10  LLE     FSU x15 LLE      TKE 5\"x20      Lateral band walking 2 laps                            Other Therapeutic Activities: HEP: [id number]    Manual Treatments:           Modalities:      Test/Measurements:     Strength RLE  Strength RLE: Exception  R Hip Flexion: 4/5  R Hip ABduction: 4-/5  R Hip Internal Rotation: 4-/5  R Hip External Rotation: 4-/5  R Knee Flexion: 4+/5  R Knee Extension: 4+/5  R Ankle Dorsiflexion: 4/5  R Ankle Plantar flexion: 4+/5  R Great Toe Extension: 4+/5  Strength LLE  Strength LLE: Exception  L Hip Flexion: 4/5  L Hip ABduction: 4-/5  L Hip Internal Rotation: 4-/5  L Hip External Rotation: 4-/5  L Knee Flexion: 4/5  L Knee Extension: 4+/5  L Ankle Dorsiflexion: 4+/5  L Ankle Plantar Flexion: 4+/5    Knee Flexion: 95 degrees. Knee Extension: -10 degrees       ASSESSMENT:  Tolerated TE well with minor increase in pain during stretching. Continues to be limited in knee flexion. Will progress as pt can tolerate. Treatment/Activity Tolerance:   [x]Patient tolerated treatment well [] Patient limited by fatique  []Patient limited by pain [] Patient limited by other medical complications  [] Other:     Goals:        Long term goals  Time Frame for Long term goals : 4 weeks  Long term goal 1: Pt will be independent and compliant with HEP (Goal Met)   Long term goal 2: Pt will improve gross LE strength by 1/3 MMT (Progressing)  Long term goal 3: Pt will report at least 25% improvement in knee pain (Goal Met)   Long term goal 4: Pt will improve R knee flexion to within 5 degrees of oppsoite LE (Progressing)   Long term goal 5: Pt will report being able to perform all ADLs with significant increase in knee pain  (Progressing)     Plan: [x] Continue per plan of care [] Alter current plan (see comments)   [] Plan of care initiated [] Hold pending MD visit [] Discharge      Plan for Next Session:      Re-Certification Due Date:         Signature:   Marquise Kaminski, PT

## 2022-03-10 ENCOUNTER — HOSPITAL ENCOUNTER (OUTPATIENT)
Dept: PHYSICAL THERAPY | Age: 67
Setting detail: THERAPIES SERIES
Discharge: HOME | End: 2022-03-10
Payer: MEDICARE

## 2022-03-10 PROCEDURE — 97110 THERAPEUTIC EXERCISES: CPT

## 2022-03-10 PROCEDURE — 97140 MANUAL THERAPY 1/> REGIONS: CPT

## 2022-03-10 NOTE — FLOWSHEET NOTE
[facility], [facility], [address]  Phone: [phone]  Fax [phone]    Physical Therapy Daily Treatment Note    Date:  3/10/2022    Patient Name:  Nemo Garcia    :  1955  MRN: [id number]  Restrictions/Precautions:    Medical/Treatment Diagnosis Information:  · Diagnosis: M25.562, G89.29 (ICD-10-CM) - Chronic pain of left knee  Insurance/Certification information:  PT Insurance Information: Medicare  Physician Information:  Referring Practitioner: Adelita Gracia DO  Plan of care signed (Y/N):  Y  Visit# / total visits:       G-Code (if applicable):          LEFS:   LEFS:     Medicare Cap (if applicable):   5325 = total amount used, updated 3/10/2022    Time in:   10:00     Timed Treatment: 39 (1 man, 2 TE) Total Treatment Time:  45  Time out: 10:45  ________________________________________________________________________________________    Pain Level:    /10  SUBJECTIVE:  Pt said he is okay today.  Nothing new to report     OBJECTIVE:     Exercise/Equipment Resistance/Repetitions Other comments   Nu-step 5 min seat 15 lvl 3           Quad set  10\"x10      Heel slide c rope 10\"x10 On ball      SLR 2x10 LLE 4#    SAQ 2x10 L 4#     Hooklying clam   Single leg ER x20  x10B  Green   Green      SL hip abduction  x15L      LAQ    Hip flexion  x15  L  x10 L 4#  4#     Seated HR   Seated Toe raise       Slant board 1 min       Supine quad stretch 4 minute LLLD LLE hanging off table with green strap       Supine ankle pumps      Supine ankle circles  4#  4#             Mini squats 2x10      Standing knee flexion stretch  10\"x10  LLE     FSU x15 LLE      TKE 5\"x20      Lateral band walking                            Other Therapeutic Activities: HEP: [id number]    Manual Treatments:       Patellar mobs grade 3-4  PA and AP tibiofemoral mobs  LAD  Leg roll Total 11 min     Modalities:      Test/Measurements: Strength RLE  Strength RLE: Exception  R Hip Flexion: 4/5  R Hip ABduction: 4-/5  R Hip Internal Rotation: 4-/5  R Hip External Rotation: 4-/5  R Knee Flexion: 4+/5  R Knee Extension: 4+/5  R Ankle Dorsiflexion: 4/5  R Ankle Plantar flexion: 4+/5  R Great Toe Extension: 4+/5  Strength LLE  Strength LLE: Exception  L Hip Flexion: 4/5  L Hip ABduction: 4-/5  L Hip Internal Rotation: 4-/5  L Hip External Rotation: 4-/5  L Knee Flexion: 4/5  L Knee Extension: 4+/5  L Ankle Dorsiflexion: 4+/5  L Ankle Plantar Flexion: 4+/5    Knee Flexion: 95 degrees. Knee Extension: -10 degrees       ASSESSMENT:  Tolerated TE well with minor increase in pain during stretching. Continues to be limited in knee flexion. Will progress as pt can tolerate. Treatment/Activity Tolerance:   [x]Patient tolerated treatment well [] Patient limited by fatique  []Patient limited by pain [] Patient limited by other medical complications  [] Other:     Goals:        Long term goals  Time Frame for Long term goals : 4 weeks  Long term goal 1: Pt will be independent and compliant with HEP (Goal Met)   Long term goal 2: Pt will improve gross LE strength by 1/3 MMT (Progressing)  Long term goal 3: Pt will report at least 25% improvement in knee pain (Goal Met)   Long term goal 4: Pt will improve R knee flexion to within 5 degrees of oppsoite LE (Progressing)   Long term goal 5: Pt will report being able to perform all ADLs with significant increase in knee pain  (Progressing)     Plan: [x] Continue per plan of care [] Alter current plan (see comments)   [] Plan of care initiated [] Hold pending MD visit [] Discharge      Plan for Next Session:      Re-Certification Due Date:         Signature:   Catalino Armando PT

## 2022-03-15 ENCOUNTER — HOSPITAL ENCOUNTER (OUTPATIENT)
Dept: PHYSICAL THERAPY | Age: 67
Setting detail: THERAPIES SERIES
Discharge: HOME | End: 2022-03-15
Payer: MEDICARE

## 2022-03-15 PROCEDURE — 97110 THERAPEUTIC EXERCISES: CPT

## 2022-03-15 PROCEDURE — 97140 MANUAL THERAPY 1/> REGIONS: CPT

## 2022-03-15 NOTE — FLOWSHEET NOTE
[facility], [facility], [facility], [address] [place] [address]   Phone: [phone]  Fax [phone]    Physical Therapy Daily Treatment Note    Date:  3/15/2022    Patient Name:  Zuly Thakkar    :  1955  MRN: [id number]  Restrictions/Precautions:    Medical/Treatment Diagnosis Information:  · Diagnosis: M25.562, G89.29 (ICD-10-CM) - Chronic pain of left knee  Insurance/Certification information:  PT Insurance Information: Medicare  Physician Information:  Referring Practitioner: Dejan Norton DO  Plan of care signed (Y/N):  Y  Visit# / total visits:       G-Code (if applicable):          LEFS:   LEFS:     Medicare Cap (if applicable):   2668 = total amount used, updated 3/15/2022    Time in:   10:00     Timed Treatment: 39 (1 man, 2 TE) Total Treatment Time:  45  Time out: 10:45  ________________________________________________________________________________________    Pain Level:    /10  SUBJECTIVE:  Pt said he is okay today.  Was a little sore this weekend as he was on his feet a lot    OBJECTIVE:     Exercise/Equipment Resistance/Repetitions Other comments   Nu-step 5 min seat 15 lvl 3           Quad set  10\"x10      Heel slide c rope 10\"x10 On ball      SLR 2x10 LLE 4#    SAQ 2x10 L 4#     Hooklying clam   Single leg ER x20  x10B  Green   Green      SL hip abduction  x15L      LAQ    Hip flexion  x15  L  x10 L 4#  4#   Seated hamstring stretch  1 min x2      Seated HR   Seated Toe raise       Slant board 1 min       Supine quad stretch 4 minute LLLD LLE hanging off table with green strap       Supine ankle pumps      Supine ankle circles  4#  4#             Mini squats 2x10      Standing knee flexion stretch  10\"x10  LLE     FSU x15 LLE      TKE       Lateral band walking                            Other Therapeutic Activities: HEP: [id number]    Manual Treatments:       Patellar mobs grade 3-4  L knee extension stretch PA and AP tibiofemoral mobs  Total 11 min     Modalities:      Test/Measurements:     Strength RLE  Strength RLE: Exception  R Hip Flexion: 4/5  R Hip ABduction: 4-/5  R Hip Internal Rotation: 4-/5  R Hip External Rotation: 4-/5  R Knee Flexion: 4+/5  R Knee Extension: 4+/5  R Ankle Dorsiflexion: 4/5  R Ankle Plantar flexion: 4+/5  R Great Toe Extension: 4+/5  Strength LLE  Strength LLE: Exception  L Hip Flexion: 4/5  L Hip ABduction: 4-/5  L Hip Internal Rotation: 4-/5  L Hip External Rotation: 4-/5  L Knee Flexion: 4/5  L Knee Extension: 4+/5  L Ankle Dorsiflexion: 4+/5  L Ankle Plantar Flexion: 4+/5    Knee Flexion: 95 degrees. Knee Extension: -10 degrees       ASSESSMENT:  Tolerated TE well with minor increase in pain during stretching. Continues to be limited in knee flexion. Will progress as pt can tolerate. Treatment/Activity Tolerance:   [x]Patient tolerated treatment well [] Patient limited by fatique  []Patient limited by pain [] Patient limited by other medical complications  [] Other:     Goals:        Long term goals  Time Frame for Long term goals : 4 weeks  Long term goal 1: Pt will be independent and compliant with HEP (Goal Met)   Long term goal 2: Pt will improve gross LE strength by 1/3 MMT (Progressing)  Long term goal 3: Pt will report at least 25% improvement in knee pain (Goal Met)   Long term goal 4: Pt will improve R knee flexion to within 5 degrees of oppsoite LE (Progressing)   Long term goal 5: Pt will report being able to perform all ADLs with significant increase in knee pain  (Progressing)     Plan: [x] Continue per plan of care [] Alter current plan (see comments)   [] Plan of care initiated [] Hold pending MD visit [] Discharge      Plan for Next Session:      Re-Certification Due Date:         Signature:   Odalys Vincent PT

## 2022-03-17 ENCOUNTER — HOSPITAL ENCOUNTER (OUTPATIENT)
Dept: PHYSICAL THERAPY | Age: 67
Setting detail: THERAPIES SERIES
Discharge: HOME | End: 2022-03-17
Payer: MEDICARE

## 2022-03-17 PROCEDURE — 97110 THERAPEUTIC EXERCISES: CPT

## 2022-03-17 PROCEDURE — 97140 MANUAL THERAPY 1/> REGIONS: CPT

## 2022-03-17 NOTE — FLOWSHEET NOTE
[facility], [facility], [address] [facility], [address] [place] [address]  Phone: [phone]  Fax [phone]    Physical Therapy Daily Treatment Note    Date:  3/17/2022    Patient Name:  Josee Tam    :  1955  MRN: [id number]  Restrictions/Precautions:    Medical/Treatment Diagnosis Information:  · Diagnosis: M25.562, G89.29 (ICD-10-CM) - Chronic pain of left knee  Insurance/Certification information:  PT Insurance Information: Medicare  Physician Information:  Referring Practitioner: Jorge Sandhu DO  Plan of care signed (Y/N):  Y  Visit# / total visits:  15/16     G-Code (if applicable):          LEFS:   LEFS:     Medicare Cap (if applicable):   7563 = total amount used, updated 3/17/2022    Time in:   10:00     Timed Treatment: 39 (1 man, 2 TE) Total Treatment Time:  45  Time out: 10:45  ________________________________________________________________________________________    Pain Level:    /10  SUBJECTIVE:  Pt said he is okay today.  Nothing new to report     OBJECTIVE:     Exercise/Equipment Resistance/Repetitions Other comments   Nu-step 5 min seat 15 lvl 3           Quad set  10\"x10      Heel slide c rope 10\"x10 On ball      SLR 2x10 LLE 4#    SAQ 2x10 L 4#     Hooklying clam   Single leg ER x20  x10B  Green   Green      SL hip abduction  x15L      LAQ    Hip flexion  x15  L  x10 L 4#  4#   Seated hamstring stretch  1 min x2      Seated HR   Seated Toe raise       Slant board 1 min       Supine quad stretch 4 minute LLLD LLE hanging off table with green strap       Supine ankle pumps      Supine ankle circles  4#  4#             Mini squats 2x10      Standing knee flexion stretch  10\"x10  LLE     FSU x15 LLE      TKE       Lateral band walking                            Other Therapeutic Activities: HEP: [id number]    Manual Treatments:       Patellar mobs grade 3-4  L knee extension stretch   PA and AP tibiofemoral mobs  Total 10 min     Modalities:      Test/Measurements:     Strength RLE  Strength RLE: Exception  R Hip Flexion: 4/5  R Hip ABduction: 4-/5  R Hip Internal Rotation: 4-/5  R Hip External Rotation: 4-/5  R Knee Flexion: 4+/5  R Knee Extension: 4+/5  R Ankle Dorsiflexion: 4/5  R Ankle Plantar flexion: 4+/5  R Great Toe Extension: 4+/5  Strength LLE  Strength LLE: Exception  L Hip Flexion: 4/5  L Hip ABduction: 4-/5  L Hip Internal Rotation: 4-/5  L Hip External Rotation: 4-/5  L Knee Flexion: 4/5  L Knee Extension: 4+/5  L Ankle Dorsiflexion: 4+/5  L Ankle Plantar Flexion: 4+/5    Knee Flexion: 95 degrees. Knee Extension: -10 degrees       ASSESSMENT:  Tolerated TE well with minor increase in pain during stretching. Continues to be limited in knee flexion. Will progress as pt can tolerate. Treatment/Activity Tolerance:   [x]Patient tolerated treatment well [] Patient limited by fatique  []Patient limited by pain [] Patient limited by other medical complications  [] Other:     Goals:        Long term goals  Time Frame for Long term goals : 4 weeks  Long term goal 1: Pt will be independent and compliant with HEP (Goal Met)   Long term goal 2: Pt will improve gross LE strength by 1/3 MMT (Progressing)  Long term goal 3: Pt will report at least 25% improvement in knee pain (Goal Met)   Long term goal 4: Pt will improve R knee flexion to within 5 degrees of oppsoite LE (Progressing)   Long term goal 5: Pt will report being able to perform all ADLs with significant increase in knee pain  (Progressing)     Plan: [x] Continue per plan of care [] Alter current plan (see comments)   [] Plan of care initiated [] Hold pending MD visit [] Discharge      Plan for Next Session:      Re-Certification Due Date:         Signature:   Flaco Dixon PT

## 2022-03-22 ENCOUNTER — HOSPITAL ENCOUNTER (OUTPATIENT)
Dept: PHYSICAL THERAPY | Age: 67
Setting detail: THERAPIES SERIES
Discharge: HOME | End: 2022-03-22
Payer: MEDICARE

## 2022-03-22 PROCEDURE — 97140 MANUAL THERAPY 1/> REGIONS: CPT

## 2022-03-22 PROCEDURE — 97110 THERAPEUTIC EXERCISES: CPT

## 2022-03-22 NOTE — FLOWSHEET NOTE
Ricky  [facility]. and Therapy, [facility], [address] [facility], [address]   Phone: [phone]  Fax [phone]    Physical Therapy Daily Treatment Note    Date:  3/22/2022    Patient Name:  Jacqulin Lundborg    :  1955  MRN: [id number]  Restrictions/Precautions:    Medical/Treatment Diagnosis Information:  · Diagnosis: M25.562, G89.29 (ICD-10-CM) - Chronic pain of left knee  Insurance/Certification information:  PT Insurance Information: Medicare  Physician Information:  Referring Practitioner: Hawk Padron DO  Plan of care signed (Y/N):  Y  Visit# / total visits:       G-Code (if applicable):          LEFS:   LEFS:   LEFS:     Medicare Cap (if applicable):   1110 = total amount used, updated 3/22/2022    Time in:   10:00     Timed Treatment: 39 (1 man, 2 TE) Total Treatment Time:  45  Time out: 10:45  ________________________________________________________________________________________    Pain Level:    /10  SUBJECTIVE:  Pt said he is doing okay. Did a lot on his feet on  and is a little more sore. Pt said he feels like he has continued to improve over this bout of therapy. Pt said he is comfortable with this exercises and would like to update his HEP so he can continue to build strength and ROM at home.      OBJECTIVE:     Exercise/Equipment Resistance/Repetitions Other comments   Nu-step 5 min seat 15 lvl 3           Quad set  10\"x10      Heel slide c rope 10\"x10 On ball      SLR 2x10 LLE 4#    SAQ 2x10 L 4#     Hooklying clam   Single leg ER x20  x15B  Green   Green      SL hip abduction  2x10L      LAQ    Hip flexion  2x10  L  x10 L 4#  4#   Seated hamstring stretch  1 min x2      Seated HR   Seated Toe raise       Slant board 1 min       Supine quad stretch 4 minute LLLD LLE hanging off table with green strap       Supine ankle pumps      Supine ankle circles  4#  4#             Mini squats 2x10      Standing knee flexion stretch  10\"x10  LLE     FSU x15 LLE      TKE       Lateral band walking                            Other Therapeutic Activities: HEP: [id number]    Manual Treatments:       Patellar mobs grade 3-4  L knee extension stretch   PA and AP tibiofemoral mobs  Total 10 min     Modalities:      Test/Measurements:     Strength RLE  Strength RLE: Exception  R Hip Flexion: 4/5  R Hip ABduction: 4/5  R Hip Internal Rotation: 4/5  R Hip External Rotation: 4/5  R Knee Flexion: 4+/5  R Knee Extension: 4+/5  R Ankle Dorsiflexion: 4/5  R Ankle Plantar flexion: 4+/5  R Great Toe Extension: 4+/5  Strength LLE  Strength LLE: Exception  L Hip Flexion: 4/5  L Hip ABduction: 4/5  L Hip Internal Rotation: 4/5  L Hip External Rotation: 4/5  L Knee Flexion: 4/5  L Knee Extension: 4+/5  L Ankle Dorsiflexion: 4+/5  L Ankle Plantar Flexion: 4+/5    Knee Flexion: 105 degrees. Knee Extension: -4 degrees       ASSESSMENT:  Pt has continued to make progress this treatment period as pt has achieved 3/5 established goals and is making progress toward remaining goals. Pt has reported compliance and independence with HEP. Pt has reported overall 50-60% improvement in frequency and intensity of pain. Pt has increased gross LE strength to 4/5 (was 4-/5). Pt improved knee flexion to 105 deg with overpessure. Pt has reported overall improved ability to perform ADLs and extracurricular activities with less knee pain. Pt reported he was only able to stand for about 5 minutes prior to therapy. Pt can now stand for \"more than 20 minutes\" without issues. Pt reported he is happy with his progress and would like an updated HEP in order to continue to progress ROM and strength.  Pt is now discharged      Treatment/Activity Tolerance:   [x]Patient tolerated treatment well [] Patient limited by fatique  []Patient limited by pain [] Patient limited by other medical complications  [] Other:     Goals:        Long term goals  Time Frame for Long term goals : 4 weeks  Long term goal 1: Pt will be independent and compliant with HEP (Goal Met)   Long term goal 2: Pt will improve gross LE strength by 1/3 MMT (Met)  Long term goal 3: Pt will report at least 25% improvement in knee pain (Goal Met)   Long term goal 4: Pt will improve R knee flexion to within 5 degrees of oppsoite LE (Progressing)   Long term goal 5: Pt will report being able to perform all ADLs without significant increase in knee pain  (Progressing)     Plan: [x] Continue per plan of care [] Alter current plan (see comments)   [] Plan of care initiated [] Hold pending MD visit [] Discharge      Plan for Next Session:      Re-Certification Due Date:         Signature:   Polina Bradley PT

## 2022-03-22 NOTE — PROGRESS NOTES
Physical Therapy        Outpatient Physical Therapy  Phone: [phone] Fax: [phone]     To: Brad Graham DO       From: Jaye Isbell, PT, PT     Patient: Alejandro Spain         : 1955  Diagnosis: M25.562, G89.29 (ICD-10-CM) - Chronic pain of left knee Date: 3/22/2022  Treatment Diagnosis:  Knee Pain     Physical Therapy Progress Note    Total Visits to date:   12 Cancels/No-shows to date:  0    Plan of Care/Treatment to date:  [x] Therapeutic Exercise    [] Modalities:  [x] Therapeutic Activity     [] Ultrasound   [] Electrical Stimulation   [] Gait Training      [] Cervical Traction   [] Lumbar Traction  [x] Neuromuscular Re-education  [] Cold/hotpack  [] Iontophoresis  [x] Instruction in HEP      Other:  [x] Manual Therapy       []                                 [] Aquatic Therapy       []                                     Significant Findings At Last Visit/Comments:    Pt has continued to make progress this treatment period as pt has achieved 2/5 established goals and is making progress toward remaining goals. Pt has reported compliance and independence with HEP. Pt has reported overall 50-60% improvement in frequency and intensity of pain. Pt has increased gross LE strength to 4/5 (was 4-/5). Pt improved knee flexion to 105 deg with overpessure. Pt has reported overall improved ability to perform ADLs and extracurricular activities with less knee pain. Pt reported he was only able to stand for about 5 minutes prior to therapy. Pt can now stand for \"more than 20 minutes\" without issues. Pt reported he is happy with his progress and would like an updated HEP in order to continue to progress ROM and strength.  Pt is now discharged      Progress towards goals:    Long term goals  Time Frame for Long term goals : 4 weeks  Long term goal 1: Pt will be independent and compliant with HEP (Goal Met)   Long term goal 2: Pt will improve gross LE strength by 1/3 MMT (Goal Met)  Long term goal 3: Pt will report at least 25% improvement in knee pain (Goal Met)   Long term goal 4: Pt will improve R knee flexion to within 5 degrees of oppsoite LE (Progressing)   Long term goal 5: Pt will report being able to perform all ADLs with significant increase in knee pain  (Progressing)     Frequency/Duration:  # Days per week: [] 1 day # Weeks: [] 1 week [x] 8 weeks      [x] 2 days   [] 2 weeks [] 5 weeks     [] 3 days   [] 3 weeks [] 6 weeks     Rehab Potential: [] Excellent [] Good [x] Fair  [] Poor     Goal Status:  [] Achieved [x] Partially Achieved  [] Not Achieved     Patient Status: [] Continue per initial plan of Care     [x] Patient now discharged     [] Additional visits requested, Please re-certify for additional visits:      Requested frequency/duration:      Electronically signed by:  Flaco Dixon PT, PT    If you have any questions or concerns, please don't hesitate to call.   Thank you for your referral.    Physician Signature:________________________________Date:__________________  By signing above, therapists plan is approved by physician

## 2022-03-24 ENCOUNTER — APPOINTMENT (OUTPATIENT)
Dept: PHYSICAL THERAPY | Age: 67
End: 2022-03-24
Payer: MEDICARE

## 2022-05-02 DIAGNOSIS — I50.32 CHRONIC DIASTOLIC CONGESTIVE HEART FAILURE (HCC): ICD-10-CM

## 2022-05-02 LAB
A/G RATIO: 2 (ref 1.1–2.2)
ALBUMIN SERPL-MCNC: 4.1 G/DL (ref 3.4–5)
ALP BLD-CCNC: 76 U/L (ref 40–129)
ALT SERPL-CCNC: 15 U/L (ref 10–40)
ANION GAP SERPL CALCULATED.3IONS-SCNC: 12 MMOL/L (ref 3–16)
AST SERPL-CCNC: 12 U/L (ref 15–37)
BACTERIA: ABNORMAL /HPF
BASOPHILS ABSOLUTE: 0 K/UL (ref 0–0.2)
BASOPHILS RELATIVE PERCENT: 0.7 %
BILIRUB SERPL-MCNC: 0.9 MG/DL (ref 0–1)
BILIRUBIN URINE: NEGATIVE
BLOOD, URINE: NEGATIVE
BUN BLDV-MCNC: 28 MG/DL (ref 7–20)
CALCIUM SERPL-MCNC: 9.5 MG/DL (ref 8.3–10.6)
CHLORIDE BLD-SCNC: 106 MMOL/L (ref 99–110)
CHOLESTEROL, FASTING: 143 MG/DL (ref 0–199)
CLARITY: CLEAR
CO2: 23 MMOL/L (ref 21–32)
COLOR: YELLOW
CREAT SERPL-MCNC: 1.3 MG/DL (ref 0.8–1.3)
EOSINOPHILS ABSOLUTE: 0.1 K/UL (ref 0–0.6)
EOSINOPHILS RELATIVE PERCENT: 1.7 %
EPITHELIAL CELLS, UA: 0 /HPF (ref 0–5)
GFR AFRICAN AMERICAN: >60
GFR NON-AFRICAN AMERICAN: 55
GLUCOSE BLD-MCNC: 94 MG/DL (ref 70–99)
GLUCOSE URINE: NEGATIVE MG/DL
HCT VFR BLD CALC: 45.6 % (ref 40.5–52.5)
HDLC SERPL-MCNC: 47 MG/DL (ref 40–60)
HEMOGLOBIN: 14.6 G/DL (ref 13.5–17.5)
HYALINE CASTS: 0 /LPF (ref 0–8)
KETONES, URINE: NEGATIVE MG/DL
LDL CHOLESTEROL CALCULATED: 71 MG/DL
LEUKOCYTE ESTERASE, URINE: ABNORMAL
LYMPHOCYTES ABSOLUTE: 1.1 K/UL (ref 1–5.1)
LYMPHOCYTES RELATIVE PERCENT: 21.2 %
MCH RBC QN AUTO: 28.4 PG (ref 26–34)
MCHC RBC AUTO-ENTMCNC: 32.1 G/DL (ref 31–36)
MCV RBC AUTO: 88.6 FL (ref 80–100)
MICROSCOPIC EXAMINATION: YES
MONOCYTES ABSOLUTE: 0.4 K/UL (ref 0–1.3)
MONOCYTES RELATIVE PERCENT: 7.9 %
NEUTROPHILS ABSOLUTE: 3.7 K/UL (ref 1.7–7.7)
NEUTROPHILS RELATIVE PERCENT: 68.5 %
NITRITE, URINE: POSITIVE
PDW BLD-RTO: 14.3 % (ref 12.4–15.4)
PH UA: 6 (ref 5–8)
PLATELET # BLD: 195 K/UL (ref 135–450)
PMV BLD AUTO: 8.6 FL (ref 5–10.5)
POTASSIUM SERPL-SCNC: 4.4 MMOL/L (ref 3.5–5.1)
PROTEIN UA: NEGATIVE MG/DL
RBC # BLD: 5.15 M/UL (ref 4.2–5.9)
RBC UA: 0 /HPF (ref 0–4)
SODIUM BLD-SCNC: 141 MMOL/L (ref 136–145)
SPECIFIC GRAVITY UA: 1.01 (ref 1–1.03)
TOTAL PROTEIN: 6.2 G/DL (ref 6.4–8.2)
TRIGLYCERIDE, FASTING: 125 MG/DL (ref 0–150)
TSH REFLEX FT4: 0.88 UIU/ML (ref 0.27–4.2)
URINE TYPE: ABNORMAL
UROBILINOGEN, URINE: 0.2 E.U./DL
VLDLC SERPL CALC-MCNC: 25 MG/DL
WBC # BLD: 5.4 K/UL (ref 4–11)
WBC UA: 34 /HPF (ref 0–5)

## 2022-05-03 ENCOUNTER — TELEPHONE (OUTPATIENT)
Dept: CARDIOLOGY CLINIC | Age: 67
End: 2022-05-03

## 2022-05-03 NOTE — TELEPHONE ENCOUNTER
Spoke with Driss Chacon, relayed message to him. He v/u and will contact his Kidney doctor today to inform him of the infection.

## 2022-05-23 NOTE — PROGRESS NOTES
[address]   Electrophysiology Outpatient Note              Date:  May 24, 2022  Patient name: Randy Winkler  YOB: 1955    Primary Care physician: Breanne Feliciano DO    HISTORY OF PRESENT ILLNESS: The patient is a 69 y.o.  male with a history of  Permanent  AF, SB, NICM, chronic combined CHF, HLD, JESSICA and CKD. Today he states he is doing well. He tries to keep himself busy. He has ups and down days. He states it is difficult at times with his house being so quite. Lost spouse recent past, He has 4 grand kids follows them to [employer] games. He monitors his BP at home. His BP ranges 120's-130's over 70's-80's. His kidney doctor told him he had gained weight. He was ordered to double his lasix dose for 10 days. Patient is taking all cardiac medications as prescribed and tolerates them well. Patient denies current chest pain, sob, palpitations, dizziness or syncope. PMH:  He reports that in 2007 he had a CV for AF. In 3/2015, he had another CV. In 8/2015, he he had RFCA of AF. In 10/2016, amiodarone was discontinued. In 12/2016, AF was recurrent and amiodarone was restarted. In 1/2017, he had a cardioversion. In 7/2019, he had a LHC that showed normal coronaries and NICM. In 8/2019, he had another CV. In 10/2019, echo showed an EF of 55%. In 11/2019, he underwent a repeat ablation for AF. In 4/2020, he was back in AF and due to lack of symptoms, amiodarone was stopped. Past Medical History:   has a past medical history of Atrial fibrillation ([address] Cardiomyopathy ([address] Chest pain, CHF (congestive heart failure) ([address] CKD (chronic kidney disease) stage 3, GFR 30-59 ml/min ([address] Family history of early CAD, Hyperlipidemia, Hypertension, JESSICA (obstructive sleep apnea), and S/P ablation of atrial fibrillation. Past Surgical History:   has a past surgical history that includes back surgery; hernia repair; Knee arthroscopy (Left, 03/20/2012);  Cardioversion (03/23/2015); transesophageal echocardiogram (8/11/2015); ablation of dysrhythmic focus (08/17/2015); Cardioversion (01/10/2017); transesophageal echocardiogram (01/10/2017); Cardiac catheterization (07/25/2019); ablation of dysrhythmic focus (11/25/2019); Colonoscopy (N/A, 1/30/2020); Colonoscopy (1/30/2020); and Colonoscopy (N/A, 1/30/2020). Home Medications:    Prior to Admission medications    Medication Sig Start Date End Date Taking?  Authorizing Provider   furosemide (LASIX) 40 MG tablet TAKE 1 TABLET BY MOUTH TWICE DAILY 5/24/22  Yes Kristy Aranda MD   eplerenone (INSPRA) 25 MG tablet TAKE 1 TABLET BY MOUTH EVERY DAY 2/10/22  Yes Kristy Aranda MD   ondansetron (ZOFRAN ODT) 4 MG disintegrating tablet Take 1 tablet by mouth every 8 hours as needed for Nausea or Vomiting 12/10/21  Yes Merritt Qureshi DO   lisinopril (PRINIVIL;ZESTRIL) 20 MG tablet TAKE 1 TABLET BY MOUTH TWICE A DAY 11/29/21  Yes Kristy Aranda MD   pravastatin (PRAVACHOL) 40 MG tablet TAKE 1 TABLET BY MOUTH EVERY DAY 11/29/21  Yes Kristy Aranda MD   isosorbide mononitrate (IMDUR) 30 MG extended release tablet Take once per day 11/29/21  Yes Kristy Aranda MD   metoprolol succinate (TOPROL XL) 50 MG extended release tablet TAKE 1 TABLET BY MOUTH TWICE A DAY 11/29/21  Yes Kristy Aranda MD   hydrALAZINE (APRESOLINE) 25 MG tablet TAKE 1 TABLET BY MOUTH TWICE A DAY 11/29/21  Yes Kristy Aranda MD   rivaroxaban (XARELTO) 20 MG TABS tablet TAKE 1 TABLET BY MOUTH EVERY DAY 11/29/21  Yes Kristy Aranda MD   fluticasone Sherie Ran) 50 MCG/ACT nasal spray 2 sprays by Each Nostril route daily 6/3/21  Yes Alex Qureshi DO   hydrOXYzine (ATARAX) 25 MG tablet Take 1 tablet by mouth every 8 hours as needed for Anxiety  Patient not taking: Reported on 5/24/2022 6/24/21   Alex Qureshi DO   melatonin (RA MELATONIN) 3 MG TABS tablet Take 1.5 tablets by mouth daily  Patient not taking: Reported on 5/24/2022 6/14/21   Noa Angulo PA-C       Allergies: Patient has no known allergies. Social History:   reports that he has never smoked. He has never used smokeless tobacco. He reports that he does not drink alcohol and does not use drugs. Family History: family history includes Cancer in his father; Diabetes in his brother and mother; Heart Disease in his mother and sister; High Blood Pressure in his mother; Osteoarthritis in his mother. All 14 point review of systems are completed and pertinent positives are mentioned in the history of present illness. Other systems are reviewed and are negative. PHYSICAL EXAM:    Vital signs:    /72   Pulse 88   Ht 6' 2\" (1.88 m)   Wt (!) 320 lb (145.2 kg)   SpO2 99%   BMI 41.09 kg/m²      Constitutional and general appearance: alert, cooperative, no distress and appears stated age  HEENT: PERRL, no cervical lymphadenopathy. No masses palpable. Normal oral mucosa  Respiratory:  · Normal excursion and expansion without use of accessory muscles  · Resp auscultation: Normal breath sounds without wheezing, rhonchi, and rales  Cardiovascular:  · The apical impulse is not displaced  · Heart tones are crisp and normal. irregular S1 and S2.  · Jugular venous pulsation Normal  · The carotid upstroke is normal in amplitude and contour without delay or bruit  · Peripheral pulses are symmetrical and full   Abdomen:  · No masses or tenderness  · Bowel sounds present  Extremities:  ·  No cyanosis or clubbing  ·  +1 edema  ·  Skin: warm and dry  Neurological:  · Alert and oriented  · Moves all extremities well  · No abnormalities of mood, affect, memory, mentation, or behavior are noted    DATA:      ECG 1/6/2022:   bpm    Echo 10/2019:   Summary   Limited echo for LV function and atrial size. Normal systolic function with an estimated ejection fraction of 55%. Upper limits of normal left ventricle size. There is mild concentric left ventricular hypertrophy.    No regional wall motion abnormalities are seen.   Normal left ventricular diastolic filling pressure. The left atrium is mildly dilated. The right atrium is moderately dilated. Cath LV gram on 4/2019 showed EF of 40%. All labs and testing reviewed. CARDIOLOGY LABS:   CBC: No results for input(s): WBC, HGB, HCT, PLT in the last 72 hours. BMP: No results for input(s): NA, K, CO2, BUN, CREATININE, LABGLOM, GLUCOSE in the last 72 hours. PT/INR: No results for input(s): PROTIME, INR in the last 72 hours. APTT:No results for input(s): APTT in the last 72 hours. FASTING LIPID PANEL:  Lab Results   Component Value Date    HDL 47 05/02/2022    HDL 34 03/28/2012    LDLCALC 71 05/02/2022    TRIG 144 04/23/2020     LIVER PROFILE:No results for input(s): AST, ALT, ALB in the last 72 hours. IMPRESSION:        Assessment:   Encounter Diagnoses   Name Primary?  Essential hypertension     Edema, unspecified type Yes    Permanent atrial fibrillation (HCC)     Obesity, Class III, BMI 40-49.9 (morbid obesity) [facility]     Stage 3a chronic kidney disease (HCC)     Chronic combined systolic and diastolic congestive heart failure (HCC)        Permanent  atrial fibrillation: stable              -s/p RFCA 8/2015 (no known recurrence until 12/2016)              -s/p DCCV on 1/10/2017, recurrent 7/2019, s/p CV 8/2019              -s/p RFCA 11/2019              -recurrent 4/2020 (asymptomatic, amiodarone stopped 6/2020)              -[id number] score 3 (HTN, age, and CHF)  HTN: controlled   NICM: resolved                     -LD 74% 22/5225  Chronic systolic diastolic CHF: compensated   CKD stage III: Followed by Dr. Francheska Bullock  Left renal atrophy: followed by urology   JESSICA: on CPAP, compliant       Plan:   1. Continue current medications  2. Continue to monitor blood pressure at home  3.  Recommend a cardiac healthy diet (low salt, avoid red meat, avoid fatty or fried foods, lots of fruits and vegetables) as well as regular moderate intensity activity for 30 minutes per day 3-5 times per week. 4.  Recommend making a daily schedule for your meals to help you grocery shop and plan  5. Start lasix 40 mg twice daily to help decrease swelling  6. Labs in June 2022: BMP  7. Monitor your fluid intake. Try to limit to 2 quarts daily  8. Follow up in 6 months    This note was scribed in the presence of Dr. Gasper Seip by Gordo Reddy RN.     I, Dr. Alvina Ludwig, personally performed the services described in this documentation, as scribed by the above signed scribe in my presence. It is both accurate and complete to my knowledge. I agree with the details independently gathered by the clinical support staff, while the remaining scribed note accurately describes my personal service to the patient.

## 2022-05-24 ENCOUNTER — OFFICE VISIT (OUTPATIENT)
Dept: CARDIOLOGY CLINIC | Age: 67
End: 2022-05-24
Payer: MEDICARE

## 2022-05-24 VITALS
SYSTOLIC BLOOD PRESSURE: 120 MMHG | DIASTOLIC BLOOD PRESSURE: 72 MMHG | WEIGHT: 315 LBS | OXYGEN SATURATION: 99 % | BODY MASS INDEX: 40.43 KG/M2 | HEART RATE: 88 BPM | HEIGHT: 74 IN

## 2022-05-24 DIAGNOSIS — I10 ESSENTIAL HYPERTENSION: ICD-10-CM

## 2022-05-24 DIAGNOSIS — E66.01 OBESITY, CLASS III, BMI 40-49.9 (MORBID OBESITY) (HCC): ICD-10-CM

## 2022-05-24 DIAGNOSIS — R60.9 EDEMA, UNSPECIFIED TYPE: Primary | ICD-10-CM

## 2022-05-24 DIAGNOSIS — I50.42 CHRONIC COMBINED SYSTOLIC AND DIASTOLIC CONGESTIVE HEART FAILURE (HCC): ICD-10-CM

## 2022-05-24 DIAGNOSIS — I48.21 PERMANENT ATRIAL FIBRILLATION (HCC): ICD-10-CM

## 2022-05-24 DIAGNOSIS — N18.31 STAGE 3A CHRONIC KIDNEY DISEASE (HCC): ICD-10-CM

## 2022-05-24 PROBLEM — I50.22 CHRONIC SYSTOLIC (CONGESTIVE) HEART FAILURE (HCC): Status: ACTIVE | Noted: 2022-05-24

## 2022-05-24 PROBLEM — N18.30 CHRONIC RENAL DISEASE, STAGE III (HCC): Status: ACTIVE | Noted: 2022-05-24

## 2022-05-24 PROCEDURE — G8427 DOCREV CUR MEDS BY ELIG CLIN: HCPCS | Performed by: INTERNAL MEDICINE

## 2022-05-24 PROCEDURE — 99214 OFFICE O/P EST MOD 30 MIN: CPT | Performed by: INTERNAL MEDICINE

## 2022-05-24 PROCEDURE — 1036F TOBACCO NON-USER: CPT | Performed by: INTERNAL MEDICINE

## 2022-05-24 PROCEDURE — G8417 CALC BMI ABV UP PARAM F/U: HCPCS | Performed by: INTERNAL MEDICINE

## 2022-05-24 PROCEDURE — 1123F ACP DISCUSS/DSCN MKR DOCD: CPT | Performed by: INTERNAL MEDICINE

## 2022-05-24 PROCEDURE — 3017F COLORECTAL CA SCREEN DOC REV: CPT | Performed by: INTERNAL MEDICINE

## 2022-05-24 RX ORDER — FUROSEMIDE 40 MG/1
TABLET ORAL
Qty: 180 TABLET | Refills: 3
Start: 2022-05-24 | End: 2022-07-19 | Stop reason: SDUPTHER

## 2022-05-24 NOTE — PATIENT INSTRUCTIONS
Plan:   1. Continue current medications  2. Continue to monitor blood pressure at home  3. Recommend a cardiac healthy diet (low salt, avoid red meat, avoid fatty or fried foods, lots of fruits and vegetables) as well as regular moderate intensity activity for 30 minutes per day 3-5 times per week. 4.  Recommend making a daily schedule for your meals to help you grocery shop and plan  5. Start lasix 40 mg twice daily to help decrease swelling  6. Labs in June 2022: BMP  7. Monitor your fluid intake. Try to limit to 2 quarts daily  8.  Follow up in 6 months

## 2022-06-14 ENCOUNTER — OFFICE VISIT (OUTPATIENT)
Dept: FAMILY MEDICINE CLINIC | Age: 67
End: 2022-06-14
Payer: MEDICARE

## 2022-06-14 VITALS
DIASTOLIC BLOOD PRESSURE: 80 MMHG | HEART RATE: 72 BPM | BODY MASS INDEX: 41.6 KG/M2 | OXYGEN SATURATION: 98 % | WEIGHT: 315 LBS | SYSTOLIC BLOOD PRESSURE: 144 MMHG

## 2022-06-14 DIAGNOSIS — G89.29 CHRONIC PAIN OF LEFT KNEE: ICD-10-CM

## 2022-06-14 DIAGNOSIS — E78.2 MIXED HYPERLIPIDEMIA: ICD-10-CM

## 2022-06-14 DIAGNOSIS — I48.0 PAROXYSMAL ATRIAL FIBRILLATION (HCC): ICD-10-CM

## 2022-06-14 DIAGNOSIS — I10 ESSENTIAL HYPERTENSION: Primary | ICD-10-CM

## 2022-06-14 DIAGNOSIS — M25.562 CHRONIC PAIN OF LEFT KNEE: ICD-10-CM

## 2022-06-14 DIAGNOSIS — N18.31 STAGE 3A CHRONIC KIDNEY DISEASE (HCC): ICD-10-CM

## 2022-06-14 PROCEDURE — 1123F ACP DISCUSS/DSCN MKR DOCD: CPT | Performed by: FAMILY MEDICINE

## 2022-06-14 PROCEDURE — G8417 CALC BMI ABV UP PARAM F/U: HCPCS | Performed by: FAMILY MEDICINE

## 2022-06-14 PROCEDURE — 1036F TOBACCO NON-USER: CPT | Performed by: FAMILY MEDICINE

## 2022-06-14 PROCEDURE — 99214 OFFICE O/P EST MOD 30 MIN: CPT | Performed by: FAMILY MEDICINE

## 2022-06-14 PROCEDURE — 3017F COLORECTAL CA SCREEN DOC REV: CPT | Performed by: FAMILY MEDICINE

## 2022-06-14 PROCEDURE — G8428 CUR MEDS NOT DOCUMENT: HCPCS | Performed by: FAMILY MEDICINE

## 2022-06-14 ASSESSMENT — ENCOUNTER SYMPTOMS
NAUSEA: 1
BLOOD IN STOOL: 0

## 2022-06-14 NOTE — PROGRESS NOTES
Tom De Los Santos is a 69 y.o. male    Chief Complaint   Patient presents with    Hypertension    Hyperlipidemia    Chronic Kidney Disease    Knee Pain     left       HPI:    Hypertension   This is a chronic problem. The current episode started more than 1 year ago. The problem is unchanged. The problem is controlled. Risk factors for coronary artery disease include male gender. Past treatments include beta blockers, ACE inhibitors and direct vasodilators. The current treatment provides significant improvement. There are no compliance problems. Hyperlipidemia   This is a chronic problem. The current episode started more than 1 year ago. The problem is controlled. Recent lipid tests were reviewed and are normal. He has no history of diabetes. Pertinent negatives include no myalgias. Current antihyperlipidemic treatment includes statins. The current treatment provides significant improvement of lipids. There are no compliance problems. Risk factors for coronary artery disease include hypertension, male sex and obesity. Atrial fibrillation, paroxysmal. Had a recent flare in 2021. He was switched to Metoprolol from Carvedilol. No blood in the stool. No history of CVA.     CHF, systolic. This is a chronic condition. Leg edema has improved since his cardioversion/ablation. Takes Lasix and Eplerenone in the morning.      CKD. Patient sees Nephrology. 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 Left knee pain. This is a chronic issue. No injury. History of meniscus repair. No numbness or tingling. It is getting better with PT and home exercises. ROS:    Review of Systems   Gastrointestinal: Positive for nausea. Negative for blood in stool. Neurological: Negative for numbness. BP (!) 144/80   Pulse 72   Wt (!) 324 lb (147 kg)   SpO2 98%   BMI 41.60 kg/m²     Physical Exam:    Physical Exam  Constitutional:       General: He is not in acute distress. Appearance: Normal appearance.  He is obese. He is not ill-appearing or toxic-appearing. HENT:      Head: Normocephalic. Neurological:      Mental Status: He is alert. Psychiatric:         Mood and Affect: Mood normal.         Behavior: Behavior normal.         Thought Content: Thought content normal.         Current Outpatient Medications   Medication Sig Dispense Refill    furosemide (LASIX) 40 MG tablet TAKE 1 TABLET BY MOUTH TWICE DAILY 180 tablet 3    eplerenone (INSPRA) 25 MG tablet TAKE 1 TABLET BY MOUTH EVERY DAY 90 tablet 1    ondansetron (ZOFRAN ODT) 4 MG disintegrating tablet Take 1 tablet by mouth every 8 hours as needed for Nausea or Vomiting 30 tablet 1    lisinopril (PRINIVIL;ZESTRIL) 20 MG tablet TAKE 1 TABLET BY MOUTH TWICE A  tablet 3    pravastatin (PRAVACHOL) 40 MG tablet TAKE 1 TABLET BY MOUTH EVERY DAY 90 tablet 3    isosorbide mononitrate (IMDUR) 30 MG extended release tablet Take once per day 90 tablet 3    metoprolol succinate (TOPROL XL) 50 MG extended release tablet TAKE 1 TABLET BY MOUTH TWICE A  tablet 3    hydrALAZINE (APRESOLINE) 25 MG tablet TAKE 1 TABLET BY MOUTH TWICE A  tablet 3    rivaroxaban (XARELTO) 20 MG TABS tablet TAKE 1 TABLET BY MOUTH EVERY DAY 90 tablet 3    hydrOXYzine (ATARAX) 25 MG tablet Take 1 tablet by mouth every 8 hours as needed for Anxiety (Patient not taking: Reported on 5/24/2022) 30 tablet 2    melatonin (RA MELATONIN) 3 MG TABS tablet Take 1.5 tablets by mouth daily (Patient not taking: Reported on 5/24/2022) 60 tablet 3    fluticasone (FLONASE) 50 MCG/ACT nasal spray 2 sprays by Each Nostril route daily 1 Bottle 5     No current facility-administered medications for this visit. Assessment:    1. Essential hypertension    2. Mixed hyperlipidemia    3. Paroxysmal atrial fibrillation (HCC)    4. Stage 3a chronic kidney disease ([address]    5. Chronic pain of left knee        Plan:    1. Essential hypertension  Stable. Continue current medications.      2. Mixed hyperlipidemia  Stable. Continue current medications. 3. Paroxysmal atrial fibrillation (HCC)  Stable. Continue current medications. 4. Stage 3a chronic kidney disease (HCC)  Stable. Continue current medications. 5. Chronic pain of left knee  X-ray showed moderate arthritis previously. Discussed to continue home exercises      Return in about 6 months (around 12/14/2022) for Hypertension, Hyperlipidemia, atrial fibrillation, CKD.

## 2022-06-22 DIAGNOSIS — R60.9 EDEMA, UNSPECIFIED TYPE: ICD-10-CM

## 2022-06-22 LAB
ANION GAP SERPL CALCULATED.3IONS-SCNC: 11 MMOL/L (ref 3–16)
BUN BLDV-MCNC: 23 MG/DL (ref 7–20)
CALCIUM SERPL-MCNC: 9.4 MG/DL (ref 8.3–10.6)
CHLORIDE BLD-SCNC: 107 MMOL/L (ref 99–110)
CO2: 26 MMOL/L (ref 21–32)
CREAT SERPL-MCNC: 1.4 MG/DL (ref 0.8–1.3)
GFR AFRICAN AMERICAN: >60
GFR NON-AFRICAN AMERICAN: 50
GLUCOSE BLD-MCNC: 99 MG/DL (ref 70–99)
POTASSIUM SERPL-SCNC: 4.2 MMOL/L (ref 3.5–5.1)
SODIUM BLD-SCNC: 144 MMOL/L (ref 136–145)

## 2022-07-11 ENCOUNTER — OFFICE VISIT (OUTPATIENT)
Dept: PULMONOLOGY | Age: 67
End: 2022-07-11
Payer: MEDICARE

## 2022-07-11 VITALS
HEIGHT: 74 IN | OXYGEN SATURATION: 98 % | DIASTOLIC BLOOD PRESSURE: 76 MMHG | BODY MASS INDEX: 40.43 KG/M2 | SYSTOLIC BLOOD PRESSURE: 124 MMHG | WEIGHT: 315 LBS | HEART RATE: 82 BPM

## 2022-07-11 DIAGNOSIS — G47.33 SEVERE OBSTRUCTIVE SLEEP APNEA: Primary | ICD-10-CM

## 2022-07-11 DIAGNOSIS — Z71.89 CPAP USE COUNSELING: ICD-10-CM

## 2022-07-11 DIAGNOSIS — E66.01 OBESITY, CLASS III, BMI 40-49.9 (MORBID OBESITY) (HCC): ICD-10-CM

## 2022-07-11 PROCEDURE — G8417 CALC BMI ABV UP PARAM F/U: HCPCS | Performed by: NURSE PRACTITIONER

## 2022-07-11 PROCEDURE — 99213 OFFICE O/P EST LOW 20 MIN: CPT | Performed by: NURSE PRACTITIONER

## 2022-07-11 PROCEDURE — G8427 DOCREV CUR MEDS BY ELIG CLIN: HCPCS | Performed by: NURSE PRACTITIONER

## 2022-07-11 PROCEDURE — 1123F ACP DISCUSS/DSCN MKR DOCD: CPT | Performed by: NURSE PRACTITIONER

## 2022-07-11 PROCEDURE — 3017F COLORECTAL CA SCREEN DOC REV: CPT | Performed by: NURSE PRACTITIONER

## 2022-07-11 PROCEDURE — 1036F TOBACCO NON-USER: CPT | Performed by: NURSE PRACTITIONER

## 2022-07-11 ASSESSMENT — SLEEP AND FATIGUE QUESTIONNAIRES
HOW LIKELY ARE YOU TO NOD OFF OR FALL ASLEEP WHILE SITTING AND TALKING TO SOMEONE: 0
HOW LIKELY ARE YOU TO NOD OFF OR FALL ASLEEP WHILE SITTING AND READING: 1
HOW LIKELY ARE YOU TO NOD OFF OR FALL ASLEEP WHILE LYING DOWN TO REST IN THE AFTERNOON WHEN CIRCUMSTANCES PERMIT: 2
NECK CIRCUMFERENCE (INCHES): 17.75
ESS TOTAL SCORE: 7
HOW LIKELY ARE YOU TO NOD OFF OR FALL ASLEEP IN A CAR, WHILE STOPPED FOR A FEW MINUTES IN TRAFFIC: 0
HOW LIKELY ARE YOU TO NOD OFF OR FALL ASLEEP WHILE SITTING QUIETLY AFTER LUNCH WITHOUT ALCOHOL: 1
HOW LIKELY ARE YOU TO NOD OFF OR FALL ASLEEP WHILE WATCHING TV: 1
HOW LIKELY ARE YOU TO NOD OFF OR FALL ASLEEP WHILE SITTING INACTIVE IN A PUBLIC PLACE: 1
HOW LIKELY ARE YOU TO NOD OFF OR FALL ASLEEP WHEN YOU ARE A PASSENGER IN A CAR FOR AN HOUR WITHOUT A BREAK: 1

## 2022-07-11 NOTE — PATIENT INSTRUCTIONS
CPAP Equipment Cleaning and Disinfecting Schedule  Equipment Cleaning Frequency Instructions  Disinfecting Frequency   Non-Disposable Filters  Weekly Mild soapy water, Rinse, Air Dry Not Required   Disposable Filters Change as needed  2-4 weeks Do Not Wash Not Required   Hose/tubing Daily Mild soapy water, Rinse, Air Dry Once a week   Mask / Nasal Pillows Daily Mild soapy water, Rinse, Air Dry Once a week   Headgear Weekly Hand wash, Mild soapy water, Rinse, Dry  Not Required   Humidifier Daily Empty water daily  Mild soapy water, Rinse well, Air Dry  Once a week   CPAP Unit As Needed Dust with damp cloth,  No detergents or sprays Not Required         Disinfect (per schedule) with 1 part white vinegar and 3 parts water- soak mask and water chamber for 30 minutes every 1-2 weeks, more often if sick. Allow water/vinegar mixture to run through tubing. Allow all equipment to air dry. Drying Hints:   Always hang tubing away from direct sunlight, as this will cause the tubing to become yellow, brittle and crack over a period of time. DO NOT attach the wet tubing to your CPAP unit to blow-dry it. The moisture from the tubing can drain back into your machine. Moisture in your unit can cause sudden pressure increases or short circuits  DO's and DON'Ts:  - Don't use alcohol-based products to clean your mask, because it can cause the materials to become hard and brittle. - Don't put headgear in the washer or dryer  - Don't use any caustic or household cleaning solutions such as bleach on your CPAP   equipment.  - Do follow the recommended cleaning schedule. - Do change your disposable filter frequently. Adapted From: [email or web address]. shtm.   These are general suggestions for all models please follow specific s recommendations and specific instructions

## 2022-07-11 NOTE — PROGRESS NOTES
BIOPSY performed by Brian Polk MD at [address] [place] [address] [place]      umbilical    KNEE ARTHROSCOPY Left 03/20/2012    Left    TRANSESOPHAGEAL ECHOCARDIOGRAM  8/11/2015    TRANSESOPHAGEAL ECHOCARDIOGRAM  01/10/2017       Current Medications:    Current Outpatient Medications:     furosemide (LASIX) 40 MG tablet, TAKE 1 TABLET BY MOUTH TWICE DAILY, Disp: 180 tablet, Rfl: 3    eplerenone (INSPRA) 25 MG tablet, TAKE 1 TABLET BY MOUTH EVERY DAY, Disp: 90 tablet, Rfl: 1    ondansetron (ZOFRAN ODT) 4 MG disintegrating tablet, Take 1 tablet by mouth every 8 hours as needed for Nausea or Vomiting, Disp: 30 tablet, Rfl: 1    lisinopril (PRINIVIL;ZESTRIL) 20 MG tablet, TAKE 1 TABLET BY MOUTH TWICE A DAY, Disp: 180 tablet, Rfl: 3    pravastatin (PRAVACHOL) 40 MG tablet, TAKE 1 TABLET BY MOUTH EVERY DAY, Disp: 90 tablet, Rfl: 3    isosorbide mononitrate (IMDUR) 30 MG extended release tablet, Take once per day, Disp: 90 tablet, Rfl: 3    metoprolol succinate (TOPROL XL) 50 MG extended release tablet, TAKE 1 TABLET BY MOUTH TWICE A DAY, Disp: 180 tablet, Rfl: 3    hydrALAZINE (APRESOLINE) 25 MG tablet, TAKE 1 TABLET BY MOUTH TWICE A DAY, Disp: 180 tablet, Rfl: 3    rivaroxaban (XARELTO) 20 MG TABS tablet, TAKE 1 TABLET BY MOUTH EVERY DAY, Disp: 90 tablet, Rfl: 3    fluticasone (FLONASE) 50 MCG/ACT nasal spray, 2 sprays by Each Nostril route daily, Disp: 1 Bottle, Rfl: 5    hydrOXYzine (ATARAX) 25 MG tablet, Take 1 tablet by mouth every 8 hours as needed for Anxiety (Patient not taking: Reported on 5/24/2022), Disp: 30 tablet, Rfl: 2    melatonin (RA MELATONIN) 3 MG TABS tablet, Take 1.5 tablets by mouth daily (Patient not taking: Reported on 5/24/2022), Disp: 60 tablet, Rfl: 3      Review of Systems      Objective:   Physical Exam  Blood pressure 124/76, pulse 82, height 6' 2\" (1.88 m), weight (!) 321 lb (145.6 kg), SpO2 98 %.' on RA    Gen: No acute distress. Obese. BMI of 41.21  Eyes: PERRL. No sclera icterus. No conjunctival injection. ENT: No discharge. Neck: Trachea midline. No obvious mass. Neck circumference 17.75\"  Resp: No accessory muscle use. No crackles. No wheezes. No rhonchi. CV: Regular rate. Regular rhythm. No murmur or rub. Skin: Warm and dry. M/S: No cyanosis. No obvious joint deformity. Neuro: Awake. Alert. Moves all four extremities. Psych: Oriented x 3. No anxiety. Data:  Split/night PSG on 5/22/15  controlled on CPAP therapy. + PLMS with leg movement index 22.0. CPAP compliance data:  Compliance download report from 6/3/17 to 7/2/17 showed patient is using machine 6:27 hrs/night with 100 % compliance and AHI 0.3 within this time frame. 30/30days with greater than 4 hours of machine use. 90% pressure 12.9 cm H20 on auto CPAP 12-16 cm H2O    Compliance download report from 6/2/18 to 7/1/18 showed patient is using machine 6:52 hrs/night with 100% compliance and AHI 0.4 within this time frame. 30/30days with greater than 4 hours of machine use. CPAP 13 cm H20    Compliance download report from 6/1/19 to 6/30/19 reviewed today by me and showed patient is using machine 6:40 hrs/night with 100% compliance and AHI 0.8 within this time frame. 30/30days with greater than 4 hours of machine use. CPAP 13 cm H20     7/6/20-unable to obtain CPAP download report (COVID-19/no modem)    CPAP compliance report from 6/14/2020-7/13/2020 on CPAP 13 cm H2O reviewed. Compliance is good 100%. AHI is good 0.8.    7/9/2021-unable to obtain CPAP download report. Patient has older CPAP with no modem    Compliance download report from 6/11/22 to 7/10/22 reviewed today by me and showed patient is using machine 7:14 hrs/night with 100% compliance and AHI 1.1 within this time frame. 30/30days with greater than 4 hours of machine use. CPAP 13 cm H20       Assessment:      · Severe JESSICA. CPAP 13 cm H2O. Nasal mask. Optimal compliance and efficacy on review today.      ·  Atrial fibrillation- ablation 2015  · HTN   · Obesity      Plan:      -Continue CPAP 13 cm H2O. Patient declines order for new CPAP at this time. States he plans to wait for replacement from the   - Advised to use CPAP 6-8 hrs at night and during naps. - Replacement of mask, tubing, head straps every 3-6 months or sooner if damaged. - Patient instructed to contact DME company for any mask, tubing or machine trouble shooting if problems arise.  - Sleep hygiene  - Avoid sedatives, alcohol and caffeinated drinks at bed time.  - No driving motorized vehicles or operating heavy machinery while fatigue, drowsy or sleepy. - Weight loss is also recommended as a long-term intervention.   - Complications of JESSICA if not treated were discussed with patient patient to include systemic hypertension, pulmonary hypertension, cardiovascular morbidities, car accidents and all cause mortality.  -Patient education regarding sleep tips and CPAP cleaning recommendations    -Discussed [employer] recently recalled some Pap units. Patient states he has registered his CPAP with the  for the recall.  -At this time, if patients have moderate to severe sleep apnea or have severe daytime sleepiness, it is recommended continue therapy until the machine can be replaced. Based upon the information we have so far, it appears the risk of stopping therapy may be higher than the risks associated with foam degradation. However, there are still many unknown variables and each patient will have to make a final determination on his or her own. Patient states he plans to continue to use his CPAP  -Please only use cleaning methods recommended by .         Follow up 1 year sooner if needed

## 2022-07-18 DIAGNOSIS — I10 ESSENTIAL HYPERTENSION: ICD-10-CM

## 2022-07-18 DIAGNOSIS — R60.9 EDEMA, UNSPECIFIED TYPE: ICD-10-CM

## 2022-07-18 NOTE — TELEPHONE ENCOUNTER
Since RKG had pt double up on his Lasix he is running out early and pharmacy needs new order. Pharmacy stating to early to refill.      [facility] [address] [address] [facility]

## 2022-07-19 RX ORDER — FUROSEMIDE 40 MG/1
TABLET ORAL
Qty: 180 TABLET | Refills: 3 | Status: SHIPPED | OUTPATIENT
Start: 2022-07-19

## 2022-08-17 NOTE — TELEPHONE ENCOUNTER
Pt is requesting refill of Eplerenone 25mg. [facility]erred [facility] [address] [phone]. Last ov 05/24/2022 mike. Upcoming ov 11/08/2022 mike.

## 2022-08-18 RX ORDER — EPLERENONE 25 MG/1
TABLET, FILM COATED ORAL
Qty: 90 TABLET | Refills: 1 | OUTPATIENT
Start: 2022-08-18

## 2022-08-18 RX ORDER — EPLERENONE 25 MG/1
25 TABLET, FILM COATED ORAL DAILY
Qty: 90 TABLET | Refills: 2 | Status: SHIPPED | OUTPATIENT
Start: 2022-08-18

## 2022-11-07 NOTE — PROGRESS NOTES
[facility]  Follow up Note              Date:  November 8, 2022  Patient name: Елена Wang  YOB: 1955    Primary Care physician: DO ERIKA Cazares:    Today, patient complains of joints issues he was diagnosed with arthritis in his knee and is receiving physical therapy for this. Patient brought blood pressure readings from home systolic readings below 560M and diastolic readings below 75. Average HR 70-80s. Patient is taking all cardiac medications as prescribed and tolerates them well. Patient denies current edema, chest pain, sob, palpitations, dizziness or syncope. PMH:  Елена Wang is a 69 y.o. male with a history of AF, SB, NICM, chronic combined CHF, HLD, JESSICA and CKD. He reports that in 2007 he had a CV for AF. In 3/2015, he had another CV. In 8/2015, he he had RFCA of AF. In 10/2016, amiodarone was discontinued. In 12/2016, AF was recurrent and amiodarone was restarted. In 1/2017, he had a cardioversion. In 7/2019, he had a LHC that showed normal coronaries and NICM. In 8/2019, he had another CV. In 10/2019, echo showed an EF of 55%. In 11/2019, he underwent a repeat ablation for AF. In 4/2020, he was back in AF and due to lack of symptoms, amiodarone was stopped. At last OV 5/24/22 he states it is difficult at times with his house being so quite. Lost spouse recent past, He has 4 grandchildren and follows them to ball games. He monitors his BP at home. His BP ranges 120's-130's over 70's-80's. His kidney doctor told him he had gained weight. He was ordered to double his lasix dose for 10 days. Past Medical History:   has a past medical history of Atrial fibrillation ([address] Cardiomyopathy ([address] Chest pain, CHF (congestive heart failure) ([address] CKD (chronic kidney disease) stage 3, GFR 30-59 ml/min ([address] Family history of early CAD, Hyperlipidemia, Hypertension, JESSICA (obstructive sleep apnea), and S/P ablation of atrial fibrillation.     Past Surgical History:   has a past surgical history that includes back surgery; hernia repair; Knee arthroscopy (Left, 03/20/2012); Cardioversion (03/23/2015); transesophageal echocardiogram (8/11/2015); ablation of dysrhythmic focus (08/17/2015); Cardioversion (01/10/2017); transesophageal echocardiogram (01/10/2017); Cardiac catheterization (07/25/2019); ablation of dysrhythmic focus (11/25/2019); Colonoscopy (N/A, 1/30/2020); Colonoscopy (1/30/2020); and Colonoscopy (N/A, 1/30/2020). Home Medications:    Prior to Admission medications    Medication Sig Start Date End Date Taking? Authorizing Provider   eplerenone (INSPRA) 25 MG tablet Take 1 tablet by mouth daily 8/18/22  Yes Narendra Mejia MD   furosemide (LASIX) 40 MG tablet TAKE 1 TABLET BY MOUTH TWICE DAILY 7/19/22  Yes Narendra Mejia MD   lisinopril (PRINIVIL;ZESTRIL) 20 MG tablet TAKE 1 TABLET BY MOUTH TWICE A DAY 11/29/21  Yes Narendra Mejia MD   pravastatin (PRAVACHOL) 40 MG tablet TAKE 1 TABLET BY MOUTH EVERY DAY 11/29/21  Yes Narendra Mejia MD   isosorbide mononitrate (IMDUR) 30 MG extended release tablet Take once per day 11/29/21  Yes Narendra Mejia MD   metoprolol succinate (TOPROL XL) 50 MG extended release tablet TAKE 1 TABLET BY MOUTH TWICE A DAY 11/29/21  Yes Narendra Mejia MD   hydrALAZINE (APRESOLINE) 25 MG tablet TAKE 1 TABLET BY MOUTH TWICE A DAY 11/29/21  Yes Narendra Mejia MD   rivaroxaban (XARELTO) 20 MG TABS tablet TAKE 1 TABLET BY MOUTH EVERY DAY 11/29/21  Yes Narendra Mejia MD   ondansetron (ZOFRAN ODT) 4 MG disintegrating tablet Take 1 tablet by mouth every 8 hours as needed for Nausea or Vomiting  Patient not taking: Reported on 11/8/2022 12/10/21   Sotirios Susannah Plater, DO   fluticasone (FLONASE) 50 MCG/ACT nasal spray 2 sprays by Each Nostril route daily  Patient not taking: Reported on 11/8/2022 6/3/21   Alex Barron DO       Allergies:  Patient has no known allergies. Social History:   reports that he has never smoked.  He has never used smokeless tobacco. He reports that he does not drink alcohol and does not use drugs. Family History: family history includes Cancer in his father; Diabetes in his brother and mother; Heart Disease in his mother and sister; High Blood Pressure in his mother; Osteoarthritis in his mother. All 14 point review of systems are completed and pertinent positives are mentioned in the history of present illness. Other systems are reviewed and are negative. PHYSICAL EXAM:    Vital signs:    /66 (Site: Right Upper Arm, Position: Sitting)   Pulse 74   Ht 6' 2\" (1.88 m)   Wt (!) 323 lb (146.5 kg)   SpO2 96%   BMI 41.47 kg/m²      Constitutional and general appearance: alert, cooperative, no distress and appears stated age  HEENT: PERRL, no cervical lymphadenopathy. No masses palpable. Normal oral mucosa  Respiratory:  Normal excursion and expansion without use of accessory muscles  Resp auscultation: Normal breath sounds without wheezing, rhonchi, and rales  Cardiovascular: The apical impulse is not displaced  Heart tones are crisp and normal. irregular HR 76bpm S1 and S2.  Jugular venous pulsation Normal  The carotid upstroke is normal in amplitude and contour without delay or bruit  Peripheral pulses are symmetrical and full   Abdomen:  No masses or tenderness  Bowel sounds present  Extremities:   No cyanosis or clubbing   +1 edema   Skin: warm and dry  Neurological:  Alert and oriented  Moves all extremities well  No abnormalities of mood, affect, memory, mentation, or behavior are noted      LABS:   All labs and testing reviewed.     FASTING LIPID PANEL:  Lab Results   Component Value Date/Time    HDL 47 05/02/2022 11:04 AM    HDL 34 03/28/2012 07:19 AM    LDLCALC 71 05/02/2022 11:04 AM    TRIG 144 04/23/2020 09:22 AM     Lab Results   Component Value Date/Time     06/22/2022 10:25 AM    K 4.2 06/22/2022 10:25 AM    K 4.3 06/14/2021 04:27 PM     06/22/2022 10:25 AM    CO2 26 06/22/2022 10:25 AM    BUN 23 06/22/2022 10:25 AM    CREATININE 1.4 06/22/2022 10:25 AM    GLUCOSE 99 06/22/2022 10:25 AM    CALCIUM 9.4 06/22/2022 10:25 AM      Lab Results   Component Value Date    TSHFT4 0.88 05/02/2022    TSH 1.02 02/24/2015    TSHREFLEX 1.62 07/10/2017     Lab Results   Component Value Date    WBC 5.4 05/02/2022    HGB 14.6 05/02/2022    HCT 45.6 05/02/2022    MCV 88.6 05/02/2022     05/02/2022         IMAGING:    ECG 1/6/2022:   bpm    Echo 10/2019:   Summary   Limited echo for LV function and atrial size. Normal systolic function with an estimated ejection fraction of 55%. Upper limits of normal left ventricle size. There is mild concentric left ventricular hypertrophy. No regional wall motion abnormalities are seen. Normal left ventricular diastolic filling pressure. The left atrium is mildly dilated. The right atrium is moderately dilated. Cath LV gram on 4/2019 showed EF of 40%. Assessment:   Encounter Diagnoses   Name Primary?     Mixed hyperlipidemia     Chronic combined systolic and diastolic congestive heart failure (HCC) Yes    Permanent atrial fibrillation ([address]     Nonischemic cardiomyopathy ([address]     Essential hypertension        Hyperlipidemia labs in May 2022 at goal continue pravastatin  Permanent  atrial fibrillation: stable he is rate controlled and anticoagulated with Xarelto              -s/p RFCA 8/2015 (no known recurrence until 12/2016)              -s/p DCCV on 1/10/2017, recurrent 7/2019, s/p CV 8/2019              -s/p RFCA 11/2019              -recurrent 4/2020 (asymptomatic, amiodarone stopped 6/2020)              -[id number] score 3 (HTN, age, and CHF)                 HTN: controlled on hydralazine and lisinopril   NICM: resolved                     -EF 55% 10/2019 Patient is on Eplerenone  hydralazine lisinopriL  and beta blocker  Chronic diastolic and systolic diastolic CHF: compensated   CKD stage III: Followed by Dr. Clotilda Hashimoto  Left renal

## 2022-11-08 ENCOUNTER — OFFICE VISIT (OUTPATIENT)
Dept: CARDIOLOGY CLINIC | Age: 67
End: 2022-11-08
Payer: MEDICARE

## 2022-11-08 VITALS
DIASTOLIC BLOOD PRESSURE: 66 MMHG | OXYGEN SATURATION: 96 % | WEIGHT: 315 LBS | BODY MASS INDEX: 40.43 KG/M2 | SYSTOLIC BLOOD PRESSURE: 102 MMHG | HEIGHT: 74 IN | HEART RATE: 74 BPM

## 2022-11-08 DIAGNOSIS — I42.8 NONISCHEMIC CARDIOMYOPATHY (HCC): ICD-10-CM

## 2022-11-08 DIAGNOSIS — I48.21 PERMANENT ATRIAL FIBRILLATION (HCC): ICD-10-CM

## 2022-11-08 DIAGNOSIS — E78.2 MIXED HYPERLIPIDEMIA: ICD-10-CM

## 2022-11-08 DIAGNOSIS — I50.42 CHRONIC COMBINED SYSTOLIC AND DIASTOLIC CONGESTIVE HEART FAILURE (HCC): Primary | ICD-10-CM

## 2022-11-08 DIAGNOSIS — I10 ESSENTIAL HYPERTENSION: ICD-10-CM

## 2022-11-08 PROCEDURE — 1123F ACP DISCUSS/DSCN MKR DOCD: CPT | Performed by: INTERNAL MEDICINE

## 2022-11-08 PROCEDURE — 1036F TOBACCO NON-USER: CPT | Performed by: INTERNAL MEDICINE

## 2022-11-08 PROCEDURE — 99214 OFFICE O/P EST MOD 30 MIN: CPT | Performed by: INTERNAL MEDICINE

## 2022-11-08 PROCEDURE — 3078F DIAST BP <80 MM HG: CPT | Performed by: INTERNAL MEDICINE

## 2022-11-08 PROCEDURE — G8484 FLU IMMUNIZE NO ADMIN: HCPCS | Performed by: INTERNAL MEDICINE

## 2022-11-08 PROCEDURE — 3017F COLORECTAL CA SCREEN DOC REV: CPT | Performed by: INTERNAL MEDICINE

## 2022-11-08 PROCEDURE — G8427 DOCREV CUR MEDS BY ELIG CLIN: HCPCS | Performed by: INTERNAL MEDICINE

## 2022-11-08 PROCEDURE — G8417 CALC BMI ABV UP PARAM F/U: HCPCS | Performed by: INTERNAL MEDICINE

## 2022-11-08 PROCEDURE — 3074F SYST BP LT 130 MM HG: CPT | Performed by: INTERNAL MEDICINE

## 2022-11-08 NOTE — PATIENT INSTRUCTIONS
Plan:   1. Blood pressure readings from home reviewed and stable. 2. Current medications reviewed. No changes at this time. No Refills needed  3. Blood work before next office visit. You must be fasting.    4 Follow up with me in April 2023

## 2022-11-28 DIAGNOSIS — I10 ESSENTIAL HYPERTENSION: ICD-10-CM

## 2022-11-28 DIAGNOSIS — R60.9 EDEMA, UNSPECIFIED TYPE: ICD-10-CM

## 2022-11-28 RX ORDER — FUROSEMIDE 40 MG/1
TABLET ORAL
Qty: 180 TABLET | Refills: 3 | Status: SHIPPED | OUTPATIENT
Start: 2022-11-28

## 2022-11-28 NOTE — TELEPHONE ENCOUNTER
Medication Refill    Medication needing refilled:furosemide (LASIX) 40 MG tablet      Dosage of the medication: 40 mg     How are you taking this medication (QD, BID, TID, QID, PRN): TAKE 1 TABLET BY MOUTH TWICE DAILY    30 or 90 day supply called in: 180     When will you run out of your medication: 3 days     Which Pharmacy are we sending the medication to?:    [facility], [place] - [address] [address] [address] [place] [address]   Phone:  [phone]  Fax:  [phone]

## 2022-12-20 ENCOUNTER — OFFICE VISIT (OUTPATIENT)
Dept: FAMILY MEDICINE CLINIC | Age: 67
End: 2022-12-20
Payer: MEDICARE

## 2022-12-20 VITALS
DIASTOLIC BLOOD PRESSURE: 74 MMHG | WEIGHT: 315 LBS | HEART RATE: 88 BPM | BODY MASS INDEX: 41.47 KG/M2 | OXYGEN SATURATION: 99 % | SYSTOLIC BLOOD PRESSURE: 142 MMHG

## 2022-12-20 DIAGNOSIS — R73.03 PREDIABETES: ICD-10-CM

## 2022-12-20 DIAGNOSIS — I10 ESSENTIAL HYPERTENSION: Primary | ICD-10-CM

## 2022-12-20 DIAGNOSIS — N18.31 STAGE 3A CHRONIC KIDNEY DISEASE (HCC): ICD-10-CM

## 2022-12-20 DIAGNOSIS — Z23 NEED FOR INFLUENZA VACCINATION: ICD-10-CM

## 2022-12-20 DIAGNOSIS — Z12.11 SCREENING FOR COLON CANCER: ICD-10-CM

## 2022-12-20 DIAGNOSIS — E78.2 MIXED HYPERLIPIDEMIA: ICD-10-CM

## 2022-12-20 DIAGNOSIS — I48.0 PAROXYSMAL ATRIAL FIBRILLATION (HCC): ICD-10-CM

## 2022-12-20 LAB — HBA1C MFR BLD: 5.9 %

## 2022-12-20 PROCEDURE — 90694 VACC AIIV4 NO PRSRV 0.5ML IM: CPT | Performed by: FAMILY MEDICINE

## 2022-12-20 PROCEDURE — 1123F ACP DISCUSS/DSCN MKR DOCD: CPT | Performed by: FAMILY MEDICINE

## 2022-12-20 PROCEDURE — G8428 CUR MEDS NOT DOCUMENT: HCPCS | Performed by: FAMILY MEDICINE

## 2022-12-20 PROCEDURE — 83036 HEMOGLOBIN GLYCOSYLATED A1C: CPT | Performed by: FAMILY MEDICINE

## 2022-12-20 PROCEDURE — 3017F COLORECTAL CA SCREEN DOC REV: CPT | Performed by: FAMILY MEDICINE

## 2022-12-20 PROCEDURE — G8484 FLU IMMUNIZE NO ADMIN: HCPCS | Performed by: FAMILY MEDICINE

## 2022-12-20 PROCEDURE — 99214 OFFICE O/P EST MOD 30 MIN: CPT | Performed by: FAMILY MEDICINE

## 2022-12-20 PROCEDURE — 3074F SYST BP LT 130 MM HG: CPT | Performed by: FAMILY MEDICINE

## 2022-12-20 PROCEDURE — 3078F DIAST BP <80 MM HG: CPT | Performed by: FAMILY MEDICINE

## 2022-12-20 PROCEDURE — 1036F TOBACCO NON-USER: CPT | Performed by: FAMILY MEDICINE

## 2022-12-20 PROCEDURE — G8417 CALC BMI ABV UP PARAM F/U: HCPCS | Performed by: FAMILY MEDICINE

## 2022-12-20 PROCEDURE — G0008 ADMIN INFLUENZA VIRUS VAC: HCPCS | Performed by: FAMILY MEDICINE

## 2022-12-20 ASSESSMENT — PATIENT HEALTH QUESTIONNAIRE - PHQ9
SUM OF ALL RESPONSES TO PHQ QUESTIONS 1-9: 0
2. FEELING DOWN, DEPRESSED OR HOPELESS: 0
1. LITTLE INTEREST OR PLEASURE IN DOING THINGS: 0
3. TROUBLE FALLING OR STAYING ASLEEP: 0
SUM OF ALL RESPONSES TO PHQ QUESTIONS 1-9: 0
6. FEELING BAD ABOUT YOURSELF - OR THAT YOU ARE A FAILURE OR HAVE LET YOURSELF OR YOUR FAMILY DOWN: 0
7. TROUBLE CONCENTRATING ON THINGS, SUCH AS READING THE NEWSPAPER OR WATCHING TELEVISION: 0
9. THOUGHTS THAT YOU WOULD BE BETTER OFF DEAD, OR OF HURTING YOURSELF: 0
4. FEELING TIRED OR HAVING LITTLE ENERGY: 0
SUM OF ALL RESPONSES TO PHQ QUESTIONS 1-9: 0
10. IF YOU CHECKED OFF ANY PROBLEMS, HOW DIFFICULT HAVE THESE PROBLEMS MADE IT FOR YOU TO DO YOUR WORK, TAKE CARE OF THINGS AT HOME, OR GET ALONG WITH OTHER PEOPLE: 0
8. MOVING OR SPEAKING SO SLOWLY THAT OTHER PEOPLE COULD HAVE NOTICED. OR THE OPPOSITE, BEING SO FIGETY OR RESTLESS THAT YOU HAVE BEEN MOVING AROUND A LOT MORE THAN USUAL: 0
5. POOR APPETITE OR OVEREATING: 0
SUM OF ALL RESPONSES TO PHQ9 QUESTIONS 1 & 2: 0
SUM OF ALL RESPONSES TO PHQ QUESTIONS 1-9: 0

## 2022-12-20 ASSESSMENT — ANXIETY QUESTIONNAIRES
6. BECOMING EASILY ANNOYED OR IRRITABLE: 0
7. FEELING AFRAID AS IF SOMETHING AWFUL MIGHT HAPPEN: 0
IF YOU CHECKED OFF ANY PROBLEMS ON THIS QUESTIONNAIRE, HOW DIFFICULT HAVE THESE PROBLEMS MADE IT FOR YOU TO DO YOUR WORK, TAKE CARE OF THINGS AT HOME, OR GET ALONG WITH OTHER PEOPLE: NOT DIFFICULT AT ALL
4. TROUBLE RELAXING: 1
3. WORRYING TOO MUCH ABOUT DIFFERENT THINGS: 0
GAD7 TOTAL SCORE: 1
2. NOT BEING ABLE TO STOP OR CONTROL WORRYING: 0
5. BEING SO RESTLESS THAT IT IS HARD TO SIT STILL: 0
1. FEELING NERVOUS, ANXIOUS, OR ON EDGE: 0

## 2022-12-20 ASSESSMENT — ENCOUNTER SYMPTOMS
BLOOD IN STOOL: 0
NAUSEA: 1

## 2022-12-20 NOTE — PROGRESS NOTES
Juanjo Pop is a 69 y.o. male    Chief Complaint   Patient presents with    Hyperlipidemia    Other     He just wants you AWARE. Just had Retina eye surgery performed. Patient is seeing bubbles    Hypertension       HPI:    Hypertension   This is a chronic problem. The current episode started more than 1 year ago. The problem is unchanged. The problem is controlled. Risk factors for coronary artery disease include male gender. Past treatments include beta blockers, ACE inhibitors and direct vasodilators. The current treatment provides significant improvement. There are no compliance problems. Hyperlipidemia   This is a chronic problem. The current episode started more than 1 year ago. The problem is controlled. Recent lipid tests were reviewed and are normal. He has no history of diabetes. Pertinent negatives include no myalgias. Current antihyperlipidemic treatment includes statins. The current treatment provides significant improvement of lipids. There are no compliance problems. Risk factors for coronary artery disease include hypertension, male sex and obesity. Atrial fibrillation, paroxysmal. This is a chronic issue. Had a recent flare in 2021. He was switched to Metoprolol from Carvedilol. No blood in the stool. No history of CVA. CHF, systolic. This is a chronic condition. Leg edema has improved since his cardioversion/ablation. Takes Lasix and Eplerenone in the morning. CKD. Patient sees Nephrology. 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 ROS:    Review of Systems   Gastrointestinal:  Positive for nausea. Negative for blood in stool. Neurological:  Negative for numbness. BP (!) 142/74   Pulse 88   Wt (!) 323 lb (146.5 kg)   SpO2 99%   BMI 41.47 kg/m²     Physical Exam:    Physical Exam  Constitutional:       General: He is not in acute distress. Appearance: Normal appearance. He is obese. He is not ill-appearing or toxic-appearing.    HENT:      Head: Normocephalic. Neurological:      Mental Status: He is alert. Psychiatric:         Mood and Affect: Mood normal.         Behavior: Behavior normal.         Thought Content: Thought content normal.       Current Outpatient Medications   Medication Sig Dispense Refill    furosemide (LASIX) 40 MG tablet TAKE 1 TABLET BY MOUTH TWICE DAILY 180 tablet 3    eplerenone (INSPRA) 25 MG tablet Take 1 tablet by mouth daily 90 tablet 2    lisinopril (PRINIVIL;ZESTRIL) 20 MG tablet TAKE 1 TABLET BY MOUTH TWICE A  tablet 3    pravastatin (PRAVACHOL) 40 MG tablet TAKE 1 TABLET BY MOUTH EVERY DAY 90 tablet 3    isosorbide mononitrate (IMDUR) 30 MG extended release tablet Take once per day 90 tablet 3    metoprolol succinate (TOPROL XL) 50 MG extended release tablet TAKE 1 TABLET BY MOUTH TWICE A  tablet 3    hydrALAZINE (APRESOLINE) 25 MG tablet TAKE 1 TABLET BY MOUTH TWICE A  tablet 3    rivaroxaban (XARELTO) 20 MG TABS tablet TAKE 1 TABLET BY MOUTH EVERY DAY 90 tablet 3    fluticasone (FLONASE) 50 MCG/ACT nasal spray 2 sprays by Each Nostril route daily (Patient not taking: Reported on 11/8/2022) 1 Bottle 5     No current facility-administered medications for this visit. Assessment:    1. Essential hypertension    2. Mixed hyperlipidemia    3. Paroxysmal atrial fibrillation (HCC)    4. Stage 3a chronic kidney disease ([facility]    5. Prediabetes    6. Need for influenza vaccination    7. Screening for colon cancer        Plan:    1. Essential hypertension  Stable. Continue current medications. 2. Mixed hyperlipidemia  Stable. Continue current medications. 3. Paroxysmal atrial fibrillation (HCC)  Stable. Continue current medications. 4. Stage 3a chronic kidney disease (HCC)  Stable. Continue current medications. 5. Prediabetes  A1c was 5.9. moderate arthritis previously.   Discussed to continue home exercises      Return in about 6 months (around 6/20/2023) for Hypertension, Hyperlipidemia, afib.

## 2022-12-29 DIAGNOSIS — E78.2 MIXED HYPERLIPIDEMIA: ICD-10-CM

## 2022-12-29 DIAGNOSIS — I10 ESSENTIAL HYPERTENSION: ICD-10-CM

## 2022-12-29 RX ORDER — LISINOPRIL 20 MG/1
TABLET ORAL
Qty: 180 TABLET | Refills: 1 | Status: SHIPPED | OUTPATIENT
Start: 2022-12-29

## 2022-12-29 RX ORDER — PRAVASTATIN SODIUM 40 MG
TABLET ORAL
Qty: 90 TABLET | Refills: 1 | Status: SHIPPED | OUTPATIENT
Start: 2022-12-29

## 2022-12-30 DIAGNOSIS — I10 ESSENTIAL HYPERTENSION: ICD-10-CM

## 2022-12-30 RX ORDER — ISOSORBIDE MONONITRATE 30 MG/1
TABLET, EXTENDED RELEASE ORAL
Qty: 90 TABLET | Refills: 3 | Status: SHIPPED | OUTPATIENT
Start: 2022-12-30

## 2023-01-05 DIAGNOSIS — I10 ESSENTIAL HYPERTENSION: ICD-10-CM

## 2023-01-06 RX ORDER — HYDRALAZINE HYDROCHLORIDE 25 MG/1
TABLET, FILM COATED ORAL
Qty: 180 TABLET | Refills: 3 | Status: SHIPPED | OUTPATIENT
Start: 2023-01-06

## 2023-01-09 LAB — PSA, TOTAL: 2.85 NG/ML (ref 0–4)

## 2023-01-12 DIAGNOSIS — R60.9 EDEMA, UNSPECIFIED TYPE: ICD-10-CM

## 2023-01-12 DIAGNOSIS — I10 ESSENTIAL HYPERTENSION: ICD-10-CM

## 2023-01-13 RX ORDER — FUROSEMIDE 40 MG/1
TABLET ORAL
Qty: 90 TABLET | Refills: 3 | Status: SHIPPED | OUTPATIENT
Start: 2023-01-13

## 2023-01-17 ENCOUNTER — TELEPHONE (OUTPATIENT)
Dept: CARDIOLOGY CLINIC | Age: 68
End: 2023-01-17

## 2023-01-17 NOTE — TELEPHONE ENCOUNTER
CARDIAC CLEARANCE REQUEST    What type of procedure are you having:  Colonoscopy  Are you taking any blood thinners:  Xarelto 20mg-hold 48 hours  Type on anesthesia:    When is your procedure scheduled for:   In March 2023  What physician is performing your procedure:  Dr. Mae Mendez  Phone Number:  [phone] ext [phone] for [facility]  Fax number to send the letter:  [phone]

## 2023-01-18 NOTE — TELEPHONE ENCOUNTER
Last OV 11/8/22    Hyperlipidemia labs in May 2022 at goal continue pravastatin  Permanent  atrial fibrillation: stable he is rate controlled and anticoagulated with Xarelto              -s/p RFCA 8/2015 (no known recurrence until 12/2016)              -s/p DCCV on 1/10/2017, recurrent 7/2019, s/p CV 8/2019              -s/p RFCA 11/2019              -recurrent 4/2020 (asymptomatic, amiodarone stopped 6/2020)              -[id number] score 3 (HTN, age, and CHF)                 HTN: controlled on hydralazine and lisinopril   NICM: resolved                     -EF 55% 10/2019 Patient is on Eplerenone  hydralazine lisinopriL  and beta blocker  Chronic diastolic and systolic diastolic CHF: compensated   CKD stage III: Followed by Dr. Ric Mathias  Left renal atrophy: followed by urology   JESSICA: on CPAP, compliant         Plan:   1. Blood pressure readings from home reviewed and stable. 2. Current medications reviewed. No changes at this time. No Refills needed  3. Blood work before next office visit. You must be fasting.    4 Follow up with me in April 2023

## 2023-01-19 ENCOUNTER — OFFICE VISIT (OUTPATIENT)
Dept: CARDIOLOGY CLINIC | Age: 68
End: 2023-01-19
Payer: MEDICARE

## 2023-01-19 VITALS
SYSTOLIC BLOOD PRESSURE: 116 MMHG | BODY MASS INDEX: 42.7 KG/M2 | WEIGHT: 315 LBS | HEART RATE: 86 BPM | OXYGEN SATURATION: 96 % | DIASTOLIC BLOOD PRESSURE: 64 MMHG

## 2023-01-19 DIAGNOSIS — I42.8 NONISCHEMIC CARDIOMYOPATHY (HCC): ICD-10-CM

## 2023-01-19 DIAGNOSIS — E66.01 OBESITY, CLASS III, BMI 40-49.9 (MORBID OBESITY) (HCC): ICD-10-CM

## 2023-01-19 DIAGNOSIS — I48.0 PAROXYSMAL ATRIAL FIBRILLATION (HCC): Primary | ICD-10-CM

## 2023-01-19 PROCEDURE — 93000 ELECTROCARDIOGRAM COMPLETE: CPT | Performed by: NURSE PRACTITIONER

## 2023-01-19 PROCEDURE — 3017F COLORECTAL CA SCREEN DOC REV: CPT | Performed by: NURSE PRACTITIONER

## 2023-01-19 PROCEDURE — 3078F DIAST BP <80 MM HG: CPT | Performed by: NURSE PRACTITIONER

## 2023-01-19 PROCEDURE — G8427 DOCREV CUR MEDS BY ELIG CLIN: HCPCS | Performed by: NURSE PRACTITIONER

## 2023-01-19 PROCEDURE — G8484 FLU IMMUNIZE NO ADMIN: HCPCS | Performed by: NURSE PRACTITIONER

## 2023-01-19 PROCEDURE — 3074F SYST BP LT 130 MM HG: CPT | Performed by: NURSE PRACTITIONER

## 2023-01-19 PROCEDURE — G8417 CALC BMI ABV UP PARAM F/U: HCPCS | Performed by: NURSE PRACTITIONER

## 2023-01-19 PROCEDURE — 1123F ACP DISCUSS/DSCN MKR DOCD: CPT | Performed by: NURSE PRACTITIONER

## 2023-01-19 PROCEDURE — 1036F TOBACCO NON-USER: CPT | Performed by: NURSE PRACTITIONER

## 2023-01-19 PROCEDURE — 99214 OFFICE O/P EST MOD 30 MIN: CPT | Performed by: NURSE PRACTITIONER

## 2023-01-19 NOTE — PROGRESS NOTES
[facility]   Electrophysiology Outpatient Note              Date:  January 19, 2023  Patient name: Nahed Perez  YOB: 1955    Primary Care physician: Rojelio Hawthorne DO    HISTORY OF PRESENT ILLNESS: The patient is a 69 y.o.  male with a history of AF, SB, NICM, CHF, HLD, JESSICA and CKD. He reports that in 2007 he had a CV for AF. In 3/2015, he had another CV. In 8/2015, he he had RFCA of AF. In 10/2016, amiodarone was discontinued. In 12/2016, AF was recurrent and amiodarone was restarted. In 1/2017, he had a cardioversion. In 7/2019, he had a LHC that showed normal coronaries and NICM. In 8/2019, he had another CV. In 10/2019, echo showed an EF of 55%. In 11/2019, he underwent a repeat ablation for AF. In 4/2020, he was back in AF and due to lack of symptoms, amiodarone was stopped. Today he is being seen for AF. EKG shows AF with a HR of 83. Patient complains of ongoing knee pain due to arthritis. He recently had eye surgery due to a torn retina. Denies chest pain, palpitations, shortness of breath, and dizziness. He is frustrated by his weight. He feels like he eats well but is having trouble keeping the weight off. Past Medical History:   has a past medical history of Atrial fibrillation ([address] Cardiomyopathy ([address] Chest pain, CHF (congestive heart failure) ([address] CKD (chronic kidney disease) stage 3, GFR 30-59 ml/min ([address] Family history of early CAD, Hyperlipidemia, Hypertension, JESSICA (obstructive sleep apnea), and S/P ablation of atrial fibrillation. Past Surgical History:   has a past surgical history that includes back surgery; hernia repair; Knee arthroscopy (Left, 03/20/2012); Cardioversion (03/23/2015); transesophageal echocardiogram (8/11/2015); ablation of dysrhythmic focus (08/17/2015); Cardioversion (01/10/2017); transesophageal echocardiogram (01/10/2017);  Cardiac catheterization (07/25/2019); ablation of dysrhythmic focus (11/25/2019); Colonoscopy (N/A, 1/30/2020); Colonoscopy (1/30/2020); and Colonoscopy (N/A, 1/30/2020). Home Medications:    Prior to Admission medications    Medication Sig Start Date End Date Taking? Authorizing Provider   furosemide (LASIX) 40 MG tablet TAKE 1 TABLET BY MOUTH EVERY DAY IN THE MORNING  Patient taking differently: 2 times daily TAKE 1 TABLET BY MOUTH EVERY DAY IN THE MORNING 1/13/23  Yes Lizzie Bell MD   hydrALAZINE (APRESOLINE) 25 MG tablet TAKE 1 TABLET BY MOUTH TWICE A DAY 1/6/23  Yes Stephanie Giles MD   isosorbide mononitrate (IMDUR) 30 MG extended release tablet TAKE ONCE PER DAY 12/30/22  Yes Stephanie Giles MD   lisinopril (PRINIVIL;ZESTRIL) 20 MG tablet TAKE 1 TABLET BY MOUTH TWICE A DAY 12/29/22  Yes Stephanie Giles MD   pravastatin (PRAVACHOL) 40 MG tablet TAKE 1 TABLET BY MOUTH EVERY DAY 12/29/22  Yes Stephanie Giles MD   eplerenone (INSPRA) 25 MG tablet Take 1 tablet by mouth daily 8/18/22  Yes Stephanie Giles MD   metoprolol succinate (TOPROL XL) 50 MG extended release tablet TAKE 1 TABLET BY MOUTH TWICE A DAY 11/29/21  Yes Stephanie Giles MD   rivaroxaban (XARELTO) 20 MG TABS tablet TAKE 1 TABLET BY MOUTH EVERY DAY 11/29/21  Yes Stephanie Giles MD   fluticasone (FLONASE) 50 MCG/ACT nasal spray 2 sprays by Each Nostril route daily  Patient not taking: Reported on 11/8/2022 6/3/21   Alex Bills DO       Allergies:  Patient has no known allergies. Social History:   reports that he has never smoked. He has never used smokeless tobacco. He reports that he does not drink alcohol and does not use drugs. Family History: family history includes Cancer in his father; Diabetes in his brother and mother; Heart Disease in his mother and sister; High Blood Pressure in his mother; Osteoarthritis in his mother. All 14 point review of systems are completed and pertinent positives are mentioned in the history of present illness. Other systems are reviewed and are negative.      PHYSICAL EXAM: Vital signs:    /64   Pulse 86   Wt (!) 332 lb 9.6 oz (150.9 kg)   SpO2 96%   BMI 42.70 kg/m²      Constitutional and general appearance: alert, cooperative, no distress and appears stated age  HEENT: PERRL, no cervical lymphadenopathy. No masses palpable. Normal oral mucosa  Respiratory:  Normal excursion and expansion without use of accessory muscles  Resp auscultation: Normal breath sounds without wheezing, rhonchi, and rales  Cardiovascular: The apical impulse is not displaced  Heart tones are crisp and normal. irregular S1 and S2.  Jugular venous pulsation Normal  The carotid upstroke is normal in amplitude and contour without delay or bruit  Peripheral pulses are symmetrical and full   Abdomen:  No masses or tenderness  Bowel sounds present  Extremities:   No cyanosis or clubbing   No lower extremity edema   Skin: warm and dry  Neurological:  Alert and oriented  Moves all extremities well  No abnormalities of mood, affect, memory, mentation, or behavior are noted    DATA:    ECG 1/6/2022:   bpm    Echo 10/2019:     Summary   Limited echo for LV function and atrial size. Normal systolic function with an estimated ejection fraction of 55%. Upper limits of normal left ventricle size. There is mild concentric left ventricular hypertrophy. No regional wall motion abnormalities are seen. Normal left ventricular diastolic filling pressure. The left atrium is mildly dilated. The right atrium is moderately dilated. Cath LV gram on 4/2019 showed EF of 40%. All labs and testing reviewed. CARDIOLOGY LABS:   CBC: No results for input(s): WBC, HGB, HCT, PLT in the last 72 hours. BMP: No results for input(s): NA, K, CO2, BUN, CREATININE, LABGLOM, GLUCOSE in the last 72 hours. PT/INR: No results for input(s): PROTIME, INR in the last 72 hours. APTT:No results for input(s): APTT in the last 72 hours.   FASTING LIPID PANEL:  Lab Results   Component Value Date/Time    HDL 47 05/02/2022 11:04 AM    HDL 34 03/28/2012 07:19 AM    LDLCALC 71 05/02/2022 11:04 AM    TRIG 144 04/23/2020 09:22 AM     LIVER PROFILE:No results for input(s): AST, ALT, ALB in the last 72 hours.     IMPRESSION:        Assessment:   Persistent atrial fibrillation: stable              -s/p RFCA 8/2015 (no known recurrence until 12/2016)              -s/p DCCV on 1/10/2017, recurrent 7/2019, s/p CV 8/2019              -s/p RFCA 11/2019              -recurrent 4/2020 (asymptomatic, amiodarone stopped 6/2020)              -[id number] score 3 (HTN, age, and CHF)  Sinus bradycardia: stable and historically asymptomatic  HTN: controlled, managed by Dr. Indio Blanco and Dr. Curt Collado   NICM: resolved                     -EF 55% 40/2323  Chronic diastolic CHF: compensated   CKD stage III: Followed by Dr. Curt Collado  Left renal atrophy: followed by urology   JESSICA: on CPAP, compliant   Obesity: diet, exercise, and weight loss is recommended        Plan:   Continue Xarelto and Toprol   Annual labs due 5/2022 (CBC, BMP)   Patient to contact office if he wants referral to [facility]   Follow up with Dr. Indio Blanco per his recommendations  Follow up with EP in one year     WESTON Rhodes-CNP  [address]  [phone]

## 2023-01-19 NOTE — PATIENT INSTRUCTIONS
No changes today     Monitor BP and HR at home and call if consistently out of goal ranges    Please let me know if you'd like referral to dietitian     Follow up in one year

## 2023-01-26 ENCOUNTER — TELEMEDICINE (OUTPATIENT)
Dept: FAMILY MEDICINE CLINIC | Age: 68
End: 2023-01-26
Payer: MEDICARE

## 2023-01-26 DIAGNOSIS — Z00.00 MEDICARE ANNUAL WELLNESS VISIT, SUBSEQUENT: Primary | ICD-10-CM

## 2023-01-26 PROCEDURE — 3017F COLORECTAL CA SCREEN DOC REV: CPT | Performed by: FAMILY MEDICINE

## 2023-01-26 PROCEDURE — G0439 PPPS, SUBSEQ VISIT: HCPCS | Performed by: FAMILY MEDICINE

## 2023-01-26 PROCEDURE — 1123F ACP DISCUSS/DSCN MKR DOCD: CPT | Performed by: FAMILY MEDICINE

## 2023-01-26 SDOH — ECONOMIC STABILITY: FOOD INSECURITY: WITHIN THE PAST 12 MONTHS, THE FOOD YOU BOUGHT JUST DIDN'T LAST AND YOU DIDN'T HAVE MONEY TO GET MORE.: NEVER TRUE

## 2023-01-26 SDOH — ECONOMIC STABILITY: FOOD INSECURITY: WITHIN THE PAST 12 MONTHS, YOU WORRIED THAT YOUR FOOD WOULD RUN OUT BEFORE YOU GOT MONEY TO BUY MORE.: NEVER TRUE

## 2023-01-26 ASSESSMENT — PATIENT HEALTH QUESTIONNAIRE - PHQ9
1. LITTLE INTEREST OR PLEASURE IN DOING THINGS: 0
2. FEELING DOWN, DEPRESSED OR HOPELESS: 0
SUM OF ALL RESPONSES TO PHQ QUESTIONS 1-9: 0
SUM OF ALL RESPONSES TO PHQ9 QUESTIONS 1 & 2: 0
SUM OF ALL RESPONSES TO PHQ QUESTIONS 1-9: 0

## 2023-01-26 ASSESSMENT — LIFESTYLE VARIABLES
HOW MANY STANDARD DRINKS CONTAINING ALCOHOL DO YOU HAVE ON A TYPICAL DAY: PATIENT DOES NOT DRINK
HOW OFTEN DO YOU HAVE A DRINK CONTAINING ALCOHOL: NEVER

## 2023-01-26 ASSESSMENT — SOCIAL DETERMINANTS OF HEALTH (SDOH): HOW HARD IS IT FOR YOU TO PAY FOR THE VERY BASICS LIKE FOOD, HOUSING, MEDICAL CARE, AND HEATING?: NOT HARD AT ALL

## 2023-01-26 NOTE — PATIENT INSTRUCTIONS
Personalized Preventive Plan for Perico Chacon - 1/26/2023  Medicare offers a range of preventive health benefits. Some of the tests and screenings are paid in full while other may be subject to a deductible, co-insurance, and/or copay. Some of these benefits include a comprehensive review of your medical history including lifestyle, illnesses that may run in your family, and various assessments and screenings as appropriate. After reviewing your medical record and screening and assessments performed today your provider may have ordered immunizations, labs, imaging, and/or referrals for you. A list of these orders (if applicable) as well as your Preventive Care list are included within your After Visit Summary for your review. Other Preventive Recommendations:    A preventive eye exam performed by an eye specialist is recommended every 1-2 years to screen for glaucoma; cataracts, macular degeneration, and other eye disorders. A preventive dental visit is recommended every 6 months. Try to get at least 150 minutes of exercise per week or 10,000 steps per day on a pedometer . Order or download the FREE \"Exercise & Physical Activity: Your Everyday Guide\" from The [employer]. Call 1-279.248.9541 or search The [employer] online. You need 3653-4563 mg of calcium and 9305-0257 IU of vitamin D per day. It is possible to meet your calcium requirement with diet alone, but a vitamin D supplement is usually necessary to meet this goal.  When exposed to the sun, use a sunscreen that protects against both UVA and UVB radiation with an SPF of 30 or greater. Reapply every 2 to 3 hours or after sweating, drying off with a towel, or swimming. Always wear a seat belt when traveling in a car. Always wear a helmet when riding a bicycle or motorcycle. Advance Directives: Care Instructions  Overview  An advance directive is a legal way to state your wishes at the end of your life.  It tells your family and your doctor what to do if you can't say what you want. There are two main types of advance directives. You can change them any time your wishes change. Living will. This form tells your family and your doctor your wishes about life support and other treatment. The form is also called a declaration. Medical power of . This form lets you name a person to make treatment decisions for you when you can't speak for yourself. This person is called a health care agent (health care proxy, health care surrogate). The form is also called a durable power of  for health care. If you do not have an advance directive, decisions about your medical care may be made by a family member, or by a doctor or a  who doesn't know you. It may help to think of an advance directive as a gift to the people who care for you. If you have one, they won't have to make tough decisions by themselves. For more information, including forms for your state, see the [address] [facility] website (www.caringinfo.org/planning/advance-directives/). Follow-up care is a key part of your treatment and safety. Be sure to make and go to all appointments, and call your doctor if you are having problems. It's also a good idea to know your test results and keep a list of the medicines you take. What should you include in an advance directive? Many states have a unique advance directive form. (It may ask you to address specific issues.) Or you might use a universal form that's approved by many states. If your form doesn't tell you what to address, it may be hard to know what to include in your advance directive. Use the questions below to help you get started. Who do you want to make decisions about your medical care if you are not able to? What life-support measures do you want if you have a serious illness that gets worse over time or can't be cured? What are you most afraid of that might happen?  (Maybe you're afraid of having pain, losing your independence, or being kept alive by machines.)  Where would you prefer to die? (Your home? A hospital? A nursing home?)  Do you want to donate your organs when you die? Do you want certain Church practices performed before you die? When should you call for help? Be sure to contact your doctor if you have any questions. Where can you learn more? Go to http://www.connolly.com/ and enter R264 to learn more about \"Advance Directives: Care Instructions. \"  Current as of: June 16, 2022               Content Version: 13.5  © 4780-5257 Healthwise, [employer]. Care instructions adapted under license by [facility] ([facility]). If you have questions about a medical condition or this instruction, always ask your healthcare professional. [address] any warranty or liability for your use of this information. Personalized Preventive Plan for Sarah Pina - 1/26/2023  Medicare offers a range of preventive health benefits. Some of the tests and screenings are paid in full while other may be subject to a deductible, co-insurance, and/or copay. Some of these benefits include a comprehensive review of your medical history including lifestyle, illnesses that may run in your family, and various assessments and screenings as appropriate. After reviewing your medical record and screening and assessments performed today your provider may have ordered immunizations, labs, imaging, and/or referrals for you. A list of these orders (if applicable) as well as your Preventive Care list are included within your After Visit Summary for your review. Other Preventive Recommendations:    A preventive eye exam performed by an eye specialist is recommended every 1-2 years to screen for glaucoma; cataracts, macular degeneration, and other eye disorders. A preventive dental visit is recommended every 6 months.   Try to get at least 150 minutes of exercise per week or 10,000 steps per day on a pedometer . Order or download the FREE \"Exercise & Physical Activity: Your Everyday Guide\" from The [employer]. Call 6-662.753.8156 or search The [employer] online. You need 7055-7073 mg of calcium and 0737-0473 IU of vitamin D per day. It is possible to meet your calcium requirement with diet alone, but a vitamin D supplement is usually necessary to meet this goal.  When exposed to the sun, use a sunscreen that protects against both UVA and UVB radiation with an SPF of 30 or greater. Reapply every 2 to 3 hours or after sweating, drying off with a towel, or swimming. Always wear a seat belt when traveling in a car. Always wear a helmet when riding a bicycle or motorcycle.

## 2023-01-26 NOTE — PROGRESS NOTES
Medicare Annual Wellness Visit    Mervin Spain is here for Medicare AWV    Assessment & Plan   Medicare annual wellness visit, subsequent      Recommendations for Preventive Services Due: see orders and patient instructions/AVS.  Recommended screening schedule for the next 5-10 years is provided to the patient in written form: see Patient Instructions/AVS.     Return for Medicare Annual Wellness Visit in 1 year.     Subjective       Patient's complete Health Risk Assessment and screening values have been reviewed and are found in Flowsheets. The following problems were reviewed today and where indicated follow up appointments were made and/or referrals ordered.    Positive Risk Factor Screenings with Interventions:       Cognitive:   Words recalled: 0 Words Recalled   Clock Drawing Test (CDT): Normal (correctly spelled the word world backwards)   Total Score: (!) 2   Total Score Interpretation: Abnormal Mini-Cog      Interventions:  Patient declines any further evaluation or treatment             Weight and Activity:  Physical Activity: Sufficiently Active    Days of Exercise per Week: 7 days    Minutes of Exercise per Session: 30 min     On average, how many days per week do you engage in moderate to strenuous exercise (like a brisk walk)?: 7 days  Have you lost any weight without trying in the past 3 months?: No       Obesity Interventions:  See AVS for additional education material                               Objective      Patient-Reported Vitals  Patient-Reported Systolic (Top): 124 mmHg  Patient-Reported Diastolic (Bottom): 79 mmHg  Patient-Reported Weight: 324lb  Patient-Reported Height: 6' 2\"              No Known Allergies  Prior to Visit Medications    Medication Sig Taking? Authorizing Provider   furosemide (LASIX) 40 MG tablet TAKE 1 TABLET BY MOUTH EVERY DAY IN THE MORNING  Patient taking differently: 2 times daily TAKE 1 TABLET BY MOUTH EVERY DAY IN THE MORNING  Stephen Todd MD   hydrALAZINE  (APRESOLINE) 25 MG tablet TAKE 1 TABLET BY MOUTH TWICE A DAY  Ricky Andrew MD   isosorbide mononitrate (IMDUR) 30 MG extended release tablet TAKE ONCE PER DAY  Ricky Andrew MD   lisinopril (PRINIVIL;ZESTRIL) 20 MG tablet TAKE 1 TABLET BY MOUTH TWICE A DAY  Ricky Andrew MD   pravastatin (PRAVACHOL) 40 MG tablet TAKE 1 TABLET BY MOUTH EVERY DAY  Ricky Andrew MD   eplerenone (INSPRA) 25 MG tablet Take 1 tablet by mouth daily  Ricky Andrew MD   metoprolol succinate (TOPROL XL) 50 MG extended release tablet TAKE 1 TABLET BY MOUTH TWICE A DAY  Ricky Andrew MD   rivaroxaban (XARELTO) 20 MG TABS tablet TAKE 1 TABLET BY MOUTH EVERY DAY  Ricky Andrew MD   fluticasone [facility]) 50 MCG/ACT nasal spray 2 sprays by Each Nostril route daily  Patient not taking: Reported on 11/8/2022  Rachelle Salgado DO       [facility] (Including outside providers/suppliers regularly involved in providing care):   Patient Care Team:  Rachelle Salgado DO as PCP - [address] [place] [address] DO Jose Daniel as PCP - [facility] Provider  Rciky Andrew MD as Consulting Physician (Cardiology)  Maryjo Marks MD as Consulting Physician (Pulmonology)  WESTON Steinberg CNP as Nurse Practitioner (Family Nurse Practitioner)  Soren Huntley MD as Consulting Physician (Urology)  Ricky Andrew MD as Consulting Physician (Cardiology)     Reviewed and updated this visit:  Tobacco  Allergies  Meds  Med Hx  Surg Hx  Soc Hx  Fam Hx               Jimmey Royalty Fish, was evaluated through a synchronous (real-time) telephone encounter. The patient (or guardian if applicable) is aware that this is a billable service, which includes applicable co-pays. This Virtual Visit was conducted with patient's (and/or legal guardian's) consent. The visit was conducted pursuant to the emergency declaration under the [facility], [address] [place] [address] authority and the [employer] and [employer] General Act.   Patient identification was verified, and a caregiver was present when appropriate. The patient was located at Home: [address] [place] [id number]. Provider was located at [facility] (Appt Dept): [address] [facility] [address] [place] [address],  [address] [facility] [address]. This encounter was performed under Maurice omalley DOs, direct supervision, 1/26/2023.

## 2023-02-03 DIAGNOSIS — I10 ESSENTIAL HYPERTENSION: ICD-10-CM

## 2023-02-03 DIAGNOSIS — I48.11 LONGSTANDING PERSISTENT ATRIAL FIBRILLATION (HCC): ICD-10-CM

## 2023-02-03 RX ORDER — METOPROLOL SUCCINATE 50 MG/1
TABLET, EXTENDED RELEASE ORAL
Qty: 180 TABLET | Refills: 3 | Status: SHIPPED | OUTPATIENT
Start: 2023-02-03

## 2023-02-24 ENCOUNTER — TELEPHONE (OUTPATIENT)
Dept: CARDIOLOGY CLINIC | Age: 68
End: 2023-02-24

## 2023-02-24 NOTE — TELEPHONE ENCOUNTER
2/24 Pt called [facility] and stated that he was told to take one pill of furosemide (LASIX) 40 MG tablet  Daily. When pt picked up his prescription from the pharmacy there were two of the same prescriptions for him. One stated to take once daily and the other stated to take twice daily. He is confused as to which one is correct. He thought that [facility] wanted him to take it once daily but the refill that sent in stated to take twice daily. Please advise.

## 2023-02-24 NOTE — TELEPHONE ENCOUNTER
Told patient to continue BID as he has been taking. That the pharmacy must have done an auto fill on old script and he will keep OV on 4/26/2023 with SHIREEN.

## 2023-03-27 DIAGNOSIS — E78.2 MIXED HYPERLIPIDEMIA: ICD-10-CM

## 2023-03-27 DIAGNOSIS — N18.31 STAGE 3A CHRONIC KIDNEY DISEASE (HCC): ICD-10-CM

## 2023-03-27 LAB
ALBUMIN SERPL-MCNC: 3.8 G/DL (ref 3.4–5)
ALBUMIN/GLOB SERPL: 1.8 {RATIO} (ref 1.1–2.2)
ALP SERPL-CCNC: 72 U/L (ref 40–129)
ALT SERPL-CCNC: 21 U/L (ref 10–40)
ANION GAP SERPL CALCULATED.3IONS-SCNC: 11 MMOL/L (ref 3–16)
AST SERPL-CCNC: 14 U/L (ref 15–37)
BASOPHILS # BLD: 0.1 K/UL (ref 0–0.2)
BASOPHILS NFR BLD: 0.9 %
BILIRUB SERPL-MCNC: 0.7 MG/DL (ref 0–1)
BUN SERPL-MCNC: 25 MG/DL (ref 7–20)
CALCIUM SERPL-MCNC: 9.4 MG/DL (ref 8.3–10.6)
CHLORIDE SERPL-SCNC: 105 MMOL/L (ref 99–110)
CHOLEST SERPL-MCNC: 151 MG/DL (ref 0–199)
CO2 SERPL-SCNC: 28 MMOL/L (ref 21–32)
CREAT SERPL-MCNC: 1.4 MG/DL (ref 0.8–1.3)
DEPRECATED RDW RBC AUTO: 13.7 % (ref 12.4–15.4)
EOSINOPHIL # BLD: 0.1 K/UL (ref 0–0.6)
EOSINOPHIL NFR BLD: 1.8 %
GFR SERPLBLD CREATININE-BSD FMLA CKD-EPI: 54 ML/MIN/{1.73_M2}
GLUCOSE SERPL-MCNC: 101 MG/DL (ref 70–99)
HCT VFR BLD AUTO: 46.2 % (ref 40.5–52.5)
HDLC SERPL-MCNC: 47 MG/DL (ref 40–60)
HGB BLD-MCNC: 14.9 G/DL (ref 13.5–17.5)
LDL CHOLESTEROL CALCULATED: 79 MG/DL
LYMPHOCYTES # BLD: 1.5 K/UL (ref 1–5.1)
LYMPHOCYTES NFR BLD: 23.3 %
MCH RBC QN AUTO: 29.3 PG (ref 26–34)
MCHC RBC AUTO-ENTMCNC: 32.3 G/DL (ref 31–36)
MCV RBC AUTO: 90.8 FL (ref 80–100)
MONOCYTES # BLD: 0.5 K/UL (ref 0–1.3)
MONOCYTES NFR BLD: 8 %
NEUTROPHILS # BLD: 4.3 K/UL (ref 1.7–7.7)
NEUTROPHILS NFR BLD: 66 %
PLATELET # BLD AUTO: 180 K/UL (ref 135–450)
PMV BLD AUTO: 9.2 FL (ref 5–10.5)
POTASSIUM SERPL-SCNC: 4.3 MMOL/L (ref 3.5–5.1)
PROT SERPL-MCNC: 5.9 G/DL (ref 6.4–8.2)
RBC # BLD AUTO: 5.09 M/UL (ref 4.2–5.9)
SODIUM SERPL-SCNC: 144 MMOL/L (ref 136–145)
TRIGL SERPL-MCNC: 127 MG/DL (ref 0–150)
TSH SERPL DL<=0.005 MIU/L-ACNC: 1.1 UIU/ML (ref 0.27–4.2)
VLDLC SERPL CALC-MCNC: 25 MG/DL
WBC # BLD AUTO: 6.6 K/UL (ref 4–11)

## 2023-03-29 ENCOUNTER — TELEPHONE (OUTPATIENT)
Dept: CARDIOLOGY CLINIC | Age: 68
End: 2023-03-29

## 2023-03-29 NOTE — TELEPHONE ENCOUNTER
----- Message from Arnoldo Mckeon MD sent at 3/29/2023 11:26 AM EDT -----  Kidney function is slightly abnormal; but not worrisome it is stable.  ----- Message -----  From: Sylvie Britt Incoming Lab Results From [facility] ([facility] Noah)  Sent: 3/27/2023   4:17 PM EDT  To: Arnoldo Mckeon MD

## 2023-04-06 ENCOUNTER — OFFICE VISIT (OUTPATIENT)
Dept: CARDIOLOGY CLINIC | Age: 68
End: 2023-04-06
Payer: MEDICARE

## 2023-04-06 VITALS
OXYGEN SATURATION: 97 % | DIASTOLIC BLOOD PRESSURE: 76 MMHG | HEIGHT: 74 IN | WEIGHT: 315 LBS | BODY MASS INDEX: 40.43 KG/M2 | SYSTOLIC BLOOD PRESSURE: 120 MMHG | HEART RATE: 88 BPM

## 2023-04-06 DIAGNOSIS — D68.69 SECONDARY HYPERCOAGULABLE STATE (HCC): ICD-10-CM

## 2023-04-06 DIAGNOSIS — I10 ESSENTIAL HYPERTENSION: ICD-10-CM

## 2023-04-06 DIAGNOSIS — I50.42 CHRONIC COMBINED SYSTOLIC AND DIASTOLIC CONGESTIVE HEART FAILURE (HCC): ICD-10-CM

## 2023-04-06 DIAGNOSIS — E66.01 CLASS 3 SEVERE OBESITY DUE TO EXCESS CALORIES WITH SERIOUS COMORBIDITY AND BODY MASS INDEX (BMI) OF 40.0 TO 44.9 IN ADULT (HCC): ICD-10-CM

## 2023-04-06 DIAGNOSIS — E78.2 MIXED HYPERLIPIDEMIA: ICD-10-CM

## 2023-04-06 DIAGNOSIS — I48.21 PERMANENT ATRIAL FIBRILLATION (HCC): Primary | ICD-10-CM

## 2023-04-06 DIAGNOSIS — I42.8 NONISCHEMIC CARDIOMYOPATHY (HCC): ICD-10-CM

## 2023-04-06 PROCEDURE — G8417 CALC BMI ABV UP PARAM F/U: HCPCS | Performed by: INTERNAL MEDICINE

## 2023-04-06 PROCEDURE — 1123F ACP DISCUSS/DSCN MKR DOCD: CPT | Performed by: INTERNAL MEDICINE

## 2023-04-06 PROCEDURE — G8427 DOCREV CUR MEDS BY ELIG CLIN: HCPCS | Performed by: INTERNAL MEDICINE

## 2023-04-06 PROCEDURE — 3078F DIAST BP <80 MM HG: CPT | Performed by: INTERNAL MEDICINE

## 2023-04-06 PROCEDURE — 1036F TOBACCO NON-USER: CPT | Performed by: INTERNAL MEDICINE

## 2023-04-06 PROCEDURE — 99214 OFFICE O/P EST MOD 30 MIN: CPT | Performed by: INTERNAL MEDICINE

## 2023-04-06 PROCEDURE — 3074F SYST BP LT 130 MM HG: CPT | Performed by: INTERNAL MEDICINE

## 2023-04-06 PROCEDURE — 3017F COLORECTAL CA SCREEN DOC REV: CPT | Performed by: INTERNAL MEDICINE

## 2023-04-06 RX ORDER — FUROSEMIDE 40 MG/1
40 TABLET ORAL 2 TIMES DAILY
Qty: 180 TABLET | Refills: 3 | Status: SHIPPED | OUTPATIENT
Start: 2023-04-06

## 2023-04-06 NOTE — PROGRESS NOTES
known allergies. Social History:   reports that he has never smoked. He has never used smokeless tobacco. He reports that he does not drink alcohol and does not use drugs. Family History: family history includes Cancer in his father; Diabetes in his brother and mother; Heart Disease in his mother and sister; High Blood Pressure in his mother; Osteoarthritis in his mother. All 14 point review of systems are completed and pertinent positives are mentioned in the history of present illness. Other systems are reviewed and are negative. PHYSICAL EXAM:    Vital signs:    /76   Pulse 88   Ht 6' 2\" (1.88 m)   Wt (!) 334 lb (151.5 kg)   SpO2 97%   BMI 42.88 kg/m²      Constitutional and general appearance: alert, cooperative, no distress and appears stated age  HEENT: PERRL, no cervical lymphadenopathy. No masses palpable. Normal oral mucosa  Respiratory:  Normal excursion and expansion without use of accessory muscles  Resp auscultation: Normal breath sounds without wheezing, rhonchi, and rales  Cardiovascular: The apical impulse is not displaced  Heart tones are crisp and normal. irregular HR 63 bpm S1 and S2.  Jugular venous pulsation Normal  The carotid upstroke is normal in amplitude and contour without delay or bruit  Peripheral pulses are symmetrical and full   Abdomen:  No masses or tenderness  Bowel sounds present  Extremities:   No cyanosis or clubbing  No edema   Skin: warm and dry  Neurological:  Alert and oriented  Moves all extremities well  No abnormalities of mood, affect, memory, mentation, or behavior are noted      LABS:   All labs and testing reviewed.     FASTING LIPID PANEL:  Lab Results   Component Value Date/Time    HDL 47 03/27/2023 09:51 AM    HDL 34 03/28/2012 07:19 AM    LDLCALC 79 03/27/2023 09:51 AM    TRIG 144 04/23/2020 09:22 AM     Lab Results   Component Value Date/Time     03/27/2023 09:51 AM    K 4.3 03/27/2023 09:51 AM    K 4.3 06/14/2021 04:27 PM

## 2023-04-06 NOTE — PATIENT INSTRUCTIONS
Plan:   Lab work from 3/29/23 reviewed. No lab work needed at this time. Current medications reviewed. No changes at this time. Refills given as warranted.    Follow up with me in 6 months

## 2023-05-15 RX ORDER — EPLERENONE 25 MG/1
TABLET, FILM COATED ORAL
Qty: 90 TABLET | Refills: 2 | Status: SHIPPED | OUTPATIENT
Start: 2023-05-15

## 2023-06-20 ENCOUNTER — OFFICE VISIT (OUTPATIENT)
Dept: FAMILY MEDICINE CLINIC | Age: 68
End: 2023-06-20

## 2023-06-20 VITALS
BODY MASS INDEX: 43.27 KG/M2 | SYSTOLIC BLOOD PRESSURE: 130 MMHG | WEIGHT: 315 LBS | OXYGEN SATURATION: 95 % | DIASTOLIC BLOOD PRESSURE: 74 MMHG | HEART RATE: 85 BPM

## 2023-06-20 DIAGNOSIS — Z87.39 HISTORY OF GOUT: ICD-10-CM

## 2023-06-20 DIAGNOSIS — I48.0 PAROXYSMAL ATRIAL FIBRILLATION (HCC): ICD-10-CM

## 2023-06-20 DIAGNOSIS — E78.2 MIXED HYPERLIPIDEMIA: ICD-10-CM

## 2023-06-20 DIAGNOSIS — R73.03 PREDIABETES: ICD-10-CM

## 2023-06-20 DIAGNOSIS — I10 ESSENTIAL HYPERTENSION: Primary | ICD-10-CM

## 2023-06-20 DIAGNOSIS — E55.9 VITAMIN D DEFICIENCY: ICD-10-CM

## 2023-06-20 DIAGNOSIS — N18.31 STAGE 3A CHRONIC KIDNEY DISEASE (HCC): ICD-10-CM

## 2023-06-20 LAB
25(OH)D3 SERPL-MCNC: 25.9 NG/ML
URATE SERPL-MCNC: 9.7 MG/DL (ref 3.5–7.2)

## 2023-06-20 ASSESSMENT — PATIENT HEALTH QUESTIONNAIRE - PHQ9
1. LITTLE INTEREST OR PLEASURE IN DOING THINGS: 0
SUM OF ALL RESPONSES TO PHQ QUESTIONS 1-9: 0
DEPRESSION UNABLE TO ASSESS: FUNCTIONAL CAPACITY MOTIVATION LIMITS ACCURACY
SUM OF ALL RESPONSES TO PHQ QUESTIONS 1-9: 0
SUM OF ALL RESPONSES TO PHQ9 QUESTIONS 1 & 2: 0
2. FEELING DOWN, DEPRESSED OR HOPELESS: 0

## 2023-06-20 ASSESSMENT — ANXIETY QUESTIONNAIRES
1. FEELING NERVOUS, ANXIOUS, OR ON EDGE: 0
5. BEING SO RESTLESS THAT IT IS HARD TO SIT STILL: 0
IF YOU CHECKED OFF ANY PROBLEMS ON THIS QUESTIONNAIRE, HOW DIFFICULT HAVE THESE PROBLEMS MADE IT FOR YOU TO DO YOUR WORK, TAKE CARE OF THINGS AT HOME, OR GET ALONG WITH OTHER PEOPLE: NOT DIFFICULT AT ALL
GAD7 TOTAL SCORE: 0
6. BECOMING EASILY ANNOYED OR IRRITABLE: 0
7. FEELING AFRAID AS IF SOMETHING AWFUL MIGHT HAPPEN: 0
4. TROUBLE RELAXING: 0
2. NOT BEING ABLE TO STOP OR CONTROL WORRYING: 0
3. WORRYING TOO MUCH ABOUT DIFFERENT THINGS: 0

## 2023-06-20 ASSESSMENT — ENCOUNTER SYMPTOMS
NAUSEA: 1
BLOOD IN STOOL: 0

## 2023-06-22 DIAGNOSIS — Z87.39 HISTORY OF GOUT: Primary | ICD-10-CM

## 2023-06-22 RX ORDER — ALLOPURINOL 100 MG/1
100 TABLET ORAL DAILY
Qty: 30 TABLET | Refills: 5 | Status: SHIPPED | OUTPATIENT
Start: 2023-06-22

## 2023-07-05 DIAGNOSIS — E78.2 MIXED HYPERLIPIDEMIA: ICD-10-CM

## 2023-07-05 DIAGNOSIS — I10 ESSENTIAL HYPERTENSION: ICD-10-CM

## 2023-07-05 RX ORDER — PRAVASTATIN SODIUM 40 MG
TABLET ORAL
Qty: 90 TABLET | Refills: 3 | Status: SHIPPED | OUTPATIENT
Start: 2023-07-05

## 2023-07-05 RX ORDER — LISINOPRIL 20 MG/1
TABLET ORAL
Qty: 180 TABLET | Refills: 3 | Status: SHIPPED | OUTPATIENT
Start: 2023-07-05

## 2023-07-17 ENCOUNTER — OFFICE VISIT (OUTPATIENT)
Dept: PULMONOLOGY | Age: 68
End: 2023-07-17

## 2023-07-17 VITALS
SYSTOLIC BLOOD PRESSURE: 125 MMHG | OXYGEN SATURATION: 96 % | BODY MASS INDEX: 40.43 KG/M2 | HEIGHT: 74 IN | DIASTOLIC BLOOD PRESSURE: 70 MMHG | HEART RATE: 83 BPM | RESPIRATION RATE: 18 BRPM | WEIGHT: 315 LBS

## 2023-07-17 DIAGNOSIS — E66.01 OBESITY, CLASS III, BMI 40-49.9 (MORBID OBESITY) (HCC): ICD-10-CM

## 2023-07-17 DIAGNOSIS — I10 ESSENTIAL HYPERTENSION: ICD-10-CM

## 2023-07-17 DIAGNOSIS — G47.33 SEVERE OBSTRUCTIVE SLEEP APNEA: Primary | ICD-10-CM

## 2023-07-17 DIAGNOSIS — Z71.89 CPAP USE COUNSELING: ICD-10-CM

## 2023-07-17 ASSESSMENT — SLEEP AND FATIGUE QUESTIONNAIRES
HOW LIKELY ARE YOU TO NOD OFF OR FALL ASLEEP WHILE SITTING AND READING: 1
ESS TOTAL SCORE: 4
HOW LIKELY ARE YOU TO NOD OFF OR FALL ASLEEP WHILE WATCHING TV: 1
HOW LIKELY ARE YOU TO NOD OFF OR FALL ASLEEP IN A CAR, WHILE STOPPED FOR A FEW MINUTES IN TRAFFIC: 0
HOW LIKELY ARE YOU TO NOD OFF OR FALL ASLEEP WHILE LYING DOWN TO REST IN THE AFTERNOON WHEN CIRCUMSTANCES PERMIT: 1
HOW LIKELY ARE YOU TO NOD OFF OR FALL ASLEEP WHEN YOU ARE A PASSENGER IN A CAR FOR AN HOUR WITHOUT A BREAK: 0
HOW LIKELY ARE YOU TO NOD OFF OR FALL ASLEEP WHILE SITTING AND TALKING TO SOMEONE: 0
HOW LIKELY ARE YOU TO NOD OFF OR FALL ASLEEP WHILE SITTING INACTIVE IN A PUBLIC PLACE: 0
HOW LIKELY ARE YOU TO NOD OFF OR FALL ASLEEP WHILE SITTING QUIETLY AFTER LUNCH WITHOUT ALCOHOL: 1
NECK CIRCUMFERENCE (INCHES): 17

## 2023-07-17 NOTE — PROGRESS NOTES
Subjective:      Patient ID: Frances Ortiz is a 77 y.o. male. HPI    Frances Ortiz is a 77 y.o. male in office for JESSICA follow up. States he is doing well with CPAP. States he needs a new CPAP. He has had current CPAP 8 years and is older outdated, obsolete unit. Patient is using CPAP   6-8 hrs/night. Using humidifier. No snoring on CPAP. The pressure is well tolerated. The mask is comfortable. No mask leak. No significant daytime sleepiness. No nodding off when driving. No dry nose or throat. Minimal fatigue. Bedtime is [place] and rise time is 8 am. Sleep onset is few minutes. Wakes up 2 times at night total. 2 nocturia. It takes few minutes to fall back a sleep. No naps during the day. No headache in am. No weight gain. No caffienated beverages during the day. No alcohol.  ESS is 4          Past Medical History:   Diagnosis Date    Atrial fibrillation ([address])     Cardiomyopathy ([address]) 07/2019    EF= 35-40%    Chest pain 07/2019    CHF (congestive heart failure) ([facility])     CKD (chronic kidney disease) stage 3, GFR 30-59 ml/min ([facility])     has only one functioning kidney    Family history of early CAD     Hyperlipidemia     Hypertension     JESSICA (obstructive sleep apnea)     uses CPAP    S/P ablation of atrial fibrillation 8/11/2015,11-25-19    RFCA       Past Surgical History:   Procedure Laterality Date    ABLATION OF DYSRHYTHMIC FOCUS  08/17/2015    CHARLY/ Atrial Fib Ablation    ABLATION OF DYSRHYTHMIC FOCUS  11/25/2019    for Atrial Fib    BACK SURGERY      CARDIAC CATHETERIZATION  07/25/2019    Non- Ischemic Cardiomyopathy    CARDIOVERSION  03/23/2015    Atrial Fib to SR    CARDIOVERSION  01/10/2017    A Fib- SR    COLONOSCOPY N/A 01/30/2020    COLONOSCOPY W/ ANESTHESIA -SLEEP APNEA- performed by Cortez Galindo MD at [facility]  01/30/2020    COLONOSCOPY WITH BIOPSY performed by Cortez Galindo MD at [facility] N/A 01/30/2020    COLONOSCOPY POLYPECTOMY

## 2023-07-18 DIAGNOSIS — Z87.39 HISTORY OF GOUT: ICD-10-CM

## 2023-07-18 RX ORDER — ALLOPURINOL 100 MG/1
TABLET ORAL
Qty: 30 TABLET | Refills: 5 | Status: SHIPPED | OUTPATIENT
Start: 2023-07-18

## 2023-07-18 NOTE — TELEPHONE ENCOUNTER
Refill Request     CONFIRM preferred pharmacy with the patient. If Mail Order Rx - Pend for 90 day refill. Last Seen: Last Seen Department: 6/20/2023  Last Seen by PCP: 6/20/2023    Last Written: 6/22/2023    If no future appointment scheduled:  Review the last OV with PCP and review information for follow-up visit,  Route STAFF MESSAGE with patient name to the [facility] for scheduling with the following information:            -  Timing of next visit           -  Visit type ie Physical, OV, etc           -  Diagnoses/Reason ie. COPD, HTN - Do not use MEDICATION, Follow-up or CHECK UP - Give reason for visit      Next Appointment:   Future Appointments   Date Time Provider [address]   9/22/2023 11:40 AM WESTON Coats - CNP CLERM PULM [facility]   10/3/2023 12:45 PM MD Claudette Reeves [facility]   10/16/2023  1:30 PM Jackie Whittaker MD [facility] Nephrolo   2/1/2024  8:30 AM SCHEDULE, ROXANA KWOK  [place] - DYD       Message sent to [address] [place] [address] to schedule appt with patient?   NO      Requested Prescriptions     Pending Prescriptions Disp Refills    allopurinol (ZYLOPRIM) 100 MG tablet [Pharmacy Med Name: ALLOPURINOL 100 MG TABLET] 30 tablet 5     Sig: TAKE 1 TABLET BY MOUTH EVERY DAY

## 2023-09-22 ENCOUNTER — OFFICE VISIT (OUTPATIENT)
Dept: PULMONOLOGY | Age: 68
End: 2023-09-22
Payer: MEDICARE

## 2023-09-22 VITALS
HEIGHT: 74 IN | OXYGEN SATURATION: 96 % | BODY MASS INDEX: 40.43 KG/M2 | DIASTOLIC BLOOD PRESSURE: 70 MMHG | SYSTOLIC BLOOD PRESSURE: 112 MMHG | HEART RATE: 98 BPM | RESPIRATION RATE: 16 BRPM | WEIGHT: 315 LBS

## 2023-09-22 DIAGNOSIS — G47.33 SEVERE OBSTRUCTIVE SLEEP APNEA: Primary | ICD-10-CM

## 2023-09-22 DIAGNOSIS — E66.01 OBESITY, CLASS III, BMI 40-49.9 (MORBID OBESITY) (HCC): ICD-10-CM

## 2023-09-22 DIAGNOSIS — I10 ESSENTIAL HYPERTENSION: ICD-10-CM

## 2023-09-22 DIAGNOSIS — R53.83 OTHER FATIGUE: ICD-10-CM

## 2023-09-22 DIAGNOSIS — Z71.89 CPAP USE COUNSELING: ICD-10-CM

## 2023-09-22 PROCEDURE — G8417 CALC BMI ABV UP PARAM F/U: HCPCS | Performed by: NURSE PRACTITIONER

## 2023-09-22 PROCEDURE — G8427 DOCREV CUR MEDS BY ELIG CLIN: HCPCS | Performed by: NURSE PRACTITIONER

## 2023-09-22 PROCEDURE — 1123F ACP DISCUSS/DSCN MKR DOCD: CPT | Performed by: NURSE PRACTITIONER

## 2023-09-22 PROCEDURE — 1036F TOBACCO NON-USER: CPT | Performed by: NURSE PRACTITIONER

## 2023-09-22 PROCEDURE — 3017F COLORECTAL CA SCREEN DOC REV: CPT | Performed by: NURSE PRACTITIONER

## 2023-09-22 PROCEDURE — 3078F DIAST BP <80 MM HG: CPT | Performed by: NURSE PRACTITIONER

## 2023-09-22 PROCEDURE — 99214 OFFICE O/P EST MOD 30 MIN: CPT | Performed by: NURSE PRACTITIONER

## 2023-09-22 PROCEDURE — 3074F SYST BP LT 130 MM HG: CPT | Performed by: NURSE PRACTITIONER

## 2023-09-22 ASSESSMENT — SLEEP AND FATIGUE QUESTIONNAIRES
HOW LIKELY ARE YOU TO NOD OFF OR FALL ASLEEP WHILE WATCHING TV: 3
HOW LIKELY ARE YOU TO NOD OFF OR FALL ASLEEP WHILE SITTING AND READING: 2
HOW LIKELY ARE YOU TO NOD OFF OR FALL ASLEEP WHILE SITTING INACTIVE IN A PUBLIC PLACE: 0
ESS TOTAL SCORE: 8
HOW LIKELY ARE YOU TO NOD OFF OR FALL ASLEEP WHILE WATCHING TV: 2
HOW LIKELY ARE YOU TO NOD OFF OR FALL ASLEEP WHILE SITTING AND TALKING TO SOMEONE: 0
HOW LIKELY ARE YOU TO NOD OFF OR FALL ASLEEP WHILE SITTING AND TALKING TO SOMEONE: 0
HOW LIKELY ARE YOU TO NOD OFF OR FALL ASLEEP WHILE SITTING QUIETLY AFTER LUNCH WITHOUT ALCOHOL: 3
HOW LIKELY ARE YOU TO NOD OFF OR FALL ASLEEP WHILE SITTING INACTIVE IN A PUBLIC PLACE: 0
HOW LIKELY ARE YOU TO NOD OFF OR FALL ASLEEP WHEN YOU ARE A PASSENGER IN A CAR FOR AN HOUR WITHOUT A BREAK: 0
HOW LIKELY ARE YOU TO NOD OFF OR FALL ASLEEP IN A CAR, WHILE STOPPED FOR A FEW MINUTES IN TRAFFIC: 0
HOW LIKELY ARE YOU TO NOD OFF OR FALL ASLEEP WHILE LYING DOWN TO REST IN THE AFTERNOON WHEN CIRCUMSTANCES PERMIT: 3
HOW LIKELY ARE YOU TO NOD OFF OR FALL ASLEEP IN A CAR, WHILE STOPPED FOR A FEW MINUTES IN TRAFFIC: 0
HOW LIKELY ARE YOU TO NOD OFF OR FALL ASLEEP WHILE SITTING AND READING: 3
ESS TOTAL SCORE: 12
HOW LIKELY ARE YOU TO NOD OFF OR FALL ASLEEP WHILE LYING DOWN TO REST IN THE AFTERNOON WHEN CIRCUMSTANCES PERMIT: 3
HOW LIKELY ARE YOU TO NOD OFF OR FALL ASLEEP WHILE SITTING QUIETLY AFTER LUNCH WITHOUT ALCOHOL: 1
HOW LIKELY ARE YOU TO NOD OFF OR FALL ASLEEP WHEN YOU ARE A PASSENGER IN A CAR FOR AN HOUR WITHOUT A BREAK: 0
NECK CIRCUMFERENCE (INCHES): 17.75

## 2023-09-22 NOTE — PROGRESS NOTES
ENDOSCOPY    EYE SURGERY      EYE SURGERY Left 03/16/2023    HERNIA REPAIR      umbilical    KNEE ARTHROSCOPY Left 03/20/2012    Left    TRANSESOPHAGEAL ECHOCARDIOGRAM  08/11/2015    TRANSESOPHAGEAL ECHOCARDIOGRAM  01/10/2017       Current Medications:    Current Outpatient Medications:     allopurinol (ZYLOPRIM) 100 MG tablet, TAKE 1 TABLET BY MOUTH EVERY DAY, Disp: 30 tablet, Rfl: 5    lisinopril (PRINIVIL;ZESTRIL) 20 MG tablet, TAKE 1 TABLET BY MOUTH TWICE A DAY, Disp: 180 tablet, Rfl: 3    pravastatin (PRAVACHOL) 40 MG tablet, TAKE 1 TABLET BY MOUTH EVERY DAY, Disp: 90 tablet, Rfl: 3    eplerenone (INSPRA) 25 MG tablet, TAKE 1 TABLET BY MOUTH EVERY DAY, Disp: 90 tablet, Rfl: 2    furosemide (LASIX) 40 MG tablet, Take 1 tablet by mouth 2 times daily, Disp: 180 tablet, Rfl: 3    metoprolol succinate (TOPROL XL) 50 MG extended release tablet, TAKE 1 TABLET BY MOUTH TWICE A DAY, Disp: 180 tablet, Rfl: 3    rivaroxaban (XARELTO) 20 MG TABS tablet, TAKE 1 TABLET BY MOUTH EVERY DAY, Disp: 90 tablet, Rfl: 3    hydrALAZINE (APRESOLINE) 25 MG tablet, TAKE 1 TABLET BY MOUTH TWICE A DAY, Disp: 180 tablet, Rfl: 3    isosorbide mononitrate (IMDUR) 30 MG extended release tablet, TAKE ONCE PER DAY, Disp: 90 tablet, Rfl: 3      Review of Systems      Objective:   Physical Exam  Blood pressure 112/70, pulse 98, resp. rate 16, height 6' 2\" (1.88 m), weight (!) 346 lb (156.9 kg), SpO2 96 %.' on RA    Gen: No acute distress. Obese. BMI of 44.42  Eyes: PERRL. No sclera icterus. No conjunctival injection. ENT: No discharge. Neck: Trachea midline. No obvious mass. Neck circumference 17.75\"  Resp: No accessory muscle use. No crackles. No wheezes. No rhonchi. CV: Regular rate. Regular rhythm. No murmur or rub. Skin: Warm and dry. M/S: No cyanosis. No obvious joint deformity. Neuro: Awake. Alert. Moves all four extremities. Psych: Oriented x 3. No anxiety.        Data:  Split/night PSG on 5/22/15  controlled on CPAP

## 2023-10-02 NOTE — PROGRESS NOTES
[facility]  Follow up Note              Date:  October 3, 2023  Patient name: Nikhil Mckenna  YOB: 1955    Primary Care physician: DO ERIKA Diaz:    At last office visit 11/8/22, patient complained of joint issues he was diagnosed with arthritis in his knee and is receiving physical therapy for this. Patient brought blood pressure readings from home systolic readings below 341P and diastolic readings below 75. Average HR 70-80s. LOV 4/5/23, patient reported in December 2022 he had repair to his left eye at the [facility] due to a tear in left eye. He needed repeat eye surgery 3 weeks ago and stated he is recovering well. His colonoscopy had to be rescheduled due to eye surgery, he stated he will reschedule this at later date. His BP readings from home on average is 130/80 with pulse 70s bpm.     Today, patient has no complaints from a cardiovascular standpoint. Patient states he has just got a new CPAP machine. He states he has used nightly since 2015. Patient brought BP readings form home with average readings of 115-120/70-75. Patient denies current edema, chest pain, sob, palpitations, dizziness or syncope. Patient is taking all cardiac medications as prescribed and tolerates them well. PMH:  Nikhil Mckenna is a 69 y.o. male with a history of AF, SB, NICM, chronic combined CHF, HLD, JESSICA and CKD. He reports that in 2007 he had a CV for AF. In 3/2015, he had another CV. In 8/2015, he he had RFCA of AF. In 10/2016, amiodarone was discontinued. In 12/2016, AF was recurrent and amiodarone was restarted. In 1/2017, he had a cardioversion. In 7/2019, he had a LHC that showed normal coronaries and NICM. In 8/2019, he had another CV. In 10/2019, echo showed an EF of 55%. In 11/2019, he underwent a repeat ablation for AF. In 4/2020, he was back in AF and due to lack of symptoms, amiodarone was stopped.      At last OV 5/24/22 he states it is difficult at times with his house being so

## 2023-10-03 ENCOUNTER — OFFICE VISIT (OUTPATIENT)
Dept: CARDIOLOGY CLINIC | Age: 68
End: 2023-10-03
Payer: MEDICARE

## 2023-10-03 VITALS
SYSTOLIC BLOOD PRESSURE: 118 MMHG | DIASTOLIC BLOOD PRESSURE: 74 MMHG | WEIGHT: 315 LBS | OXYGEN SATURATION: 97 % | HEART RATE: 88 BPM | BODY MASS INDEX: 40.43 KG/M2 | HEIGHT: 74 IN

## 2023-10-03 DIAGNOSIS — G47.33 SEVERE OBSTRUCTIVE SLEEP APNEA: ICD-10-CM

## 2023-10-03 DIAGNOSIS — I48.21 PERMANENT ATRIAL FIBRILLATION (HCC): ICD-10-CM

## 2023-10-03 DIAGNOSIS — I50.42 CHRONIC COMBINED SYSTOLIC AND DIASTOLIC CONGESTIVE HEART FAILURE (HCC): ICD-10-CM

## 2023-10-03 DIAGNOSIS — I10 ESSENTIAL HYPERTENSION: ICD-10-CM

## 2023-10-03 DIAGNOSIS — I42.8 NONISCHEMIC CARDIOMYOPATHY (HCC): Primary | ICD-10-CM

## 2023-10-03 DIAGNOSIS — E78.2 MIXED HYPERLIPIDEMIA: ICD-10-CM

## 2023-10-03 PROBLEM — I48.0 PAROXYSMAL ATRIAL FIBRILLATION (HCC): Status: RESOLVED | Noted: 2022-06-14 | Resolved: 2023-10-03

## 2023-10-03 PROCEDURE — 1036F TOBACCO NON-USER: CPT | Performed by: INTERNAL MEDICINE

## 2023-10-03 PROCEDURE — 99214 OFFICE O/P EST MOD 30 MIN: CPT | Performed by: INTERNAL MEDICINE

## 2023-10-03 PROCEDURE — G8417 CALC BMI ABV UP PARAM F/U: HCPCS | Performed by: INTERNAL MEDICINE

## 2023-10-03 PROCEDURE — G8427 DOCREV CUR MEDS BY ELIG CLIN: HCPCS | Performed by: INTERNAL MEDICINE

## 2023-10-03 PROCEDURE — 3078F DIAST BP <80 MM HG: CPT | Performed by: INTERNAL MEDICINE

## 2023-10-03 PROCEDURE — 3017F COLORECTAL CA SCREEN DOC REV: CPT | Performed by: INTERNAL MEDICINE

## 2023-10-03 PROCEDURE — 1123F ACP DISCUSS/DSCN MKR DOCD: CPT | Performed by: INTERNAL MEDICINE

## 2023-10-03 PROCEDURE — 3074F SYST BP LT 130 MM HG: CPT | Performed by: INTERNAL MEDICINE

## 2023-10-03 PROCEDURE — G8484 FLU IMMUNIZE NO ADMIN: HCPCS | Performed by: INTERNAL MEDICINE

## 2023-10-03 NOTE — PATIENT INSTRUCTIONS
Plan:   Lab work from 3/29/23 reviewed. Repeat Lab work March 2024  Current medications reviewed. No changes at this time. Refills given as warranted. Follow up with me in 6 months. Call in January to make follow up visit.

## 2023-10-06 DIAGNOSIS — I50.22 CHRONIC SYSTOLIC CONGESTIVE HEART FAILURE (HCC): ICD-10-CM

## 2023-10-07 LAB
ALBUMIN SERPL-MCNC: 3.9 G/DL (ref 3.4–5)
ANION GAP SERPL CALCULATED.3IONS-SCNC: 12 MMOL/L (ref 3–16)
BUN SERPL-MCNC: 31 MG/DL (ref 7–20)
CALCIUM SERPL-MCNC: 9.6 MG/DL (ref 8.3–10.6)
CHLORIDE SERPL-SCNC: 104 MMOL/L (ref 99–110)
CO2 SERPL-SCNC: 27 MMOL/L (ref 21–32)
CREAT SERPL-MCNC: 1.3 MG/DL (ref 0.8–1.3)
GFR SERPLBLD CREATININE-BSD FMLA CKD-EPI: 59 ML/MIN/{1.73_M2}
GLUCOSE SERPL-MCNC: 106 MG/DL (ref 70–99)
NT-PROBNP SERPL-MCNC: 793 PG/ML (ref 0–124)
PHOSPHATE SERPL-MCNC: 3.5 MG/DL (ref 2.5–4.9)
POTASSIUM SERPL-SCNC: 3.8 MMOL/L (ref 3.5–5.1)
SODIUM SERPL-SCNC: 143 MMOL/L (ref 136–145)

## 2023-11-16 DIAGNOSIS — I10 ESSENTIAL HYPERTENSION: ICD-10-CM

## 2023-11-16 RX ORDER — FUROSEMIDE 40 MG/1
40 TABLET ORAL 2 TIMES DAILY
Qty: 180 TABLET | Refills: 1 | Status: SHIPPED | OUTPATIENT
Start: 2023-11-16

## 2024-01-05 DIAGNOSIS — Z87.39 HISTORY OF GOUT: ICD-10-CM

## 2024-01-05 DIAGNOSIS — I10 ESSENTIAL HYPERTENSION: ICD-10-CM

## 2024-01-05 RX ORDER — ALLOPURINOL 100 MG/1
TABLET ORAL
Qty: 90 TABLET | Refills: 1 | Status: SHIPPED | OUTPATIENT
Start: 2024-01-05

## 2024-01-05 RX ORDER — ISOSORBIDE MONONITRATE 30 MG/1
TABLET, EXTENDED RELEASE ORAL
Qty: 90 TABLET | Refills: 3 | Status: SHIPPED | OUTPATIENT
Start: 2024-01-05

## 2024-01-05 RX ORDER — HYDRALAZINE HYDROCHLORIDE 25 MG/1
TABLET, FILM COATED ORAL
Qty: 180 TABLET | Refills: 3 | Status: SHIPPED | OUTPATIENT
Start: 2024-01-05

## 2024-01-05 NOTE — TELEPHONE ENCOUNTER
Refill Request     CONFIRM preferred pharmacy with the patient.    If Mail Order Rx - Pend for 90 day refill.      Last Seen: Last Seen Department: 6/20/2023  Last Seen by PCP: 6/20/2023    Last Written: 07/18/23 30 with 5 refills    If no future appointment scheduled:  Review the last OV with PCP and review information for follow-up visit,  Route STAFF MESSAGE with patient name to the  Pool for scheduling with the following information:            -  Timing of next visit           -  Visit type ie Physical, OV, etc           -  Diagnoses/Reason ie. COPD, HTN - Do not use MEDICATION, Follow-up or CHECK UP - Give reason for visit      Next Appointment:   Future Appointments   Date Time Provider Department Center   2/1/2024  8:30 AM SCHEDULE, MHC[facility] RISSA TREVINO AWV LPLITZY KWOK  [place] - DYD   4/15/2024  2:00 PM Vincent Hill MD [facility] AND [facility] Nephrolo   9/20/2024 11:40 AM Elodia Vega APRN - CNP CLERM PULM [facility]       Message sent to  to schedule appt with patient?  NO      Requested Prescriptions     Pending Prescriptions Disp Refills    allopurinol (ZYLOPRIM) 100 MG tablet [Pharmacy Med Name: ALLOPURINOL 100 MG TABLET] 90 tablet 1     Sig: TAKE 1 TABLET BY MOUTH EVERY DAY

## 2024-01-05 NOTE — TELEPHONE ENCOUNTER
Josiah Lopez MD  [facility]  Caller: Unspecified (Today, 12:21 AM)  Please schedule a follow up 6 months from the last office visit.

## 2024-01-31 SDOH — ECONOMIC STABILITY: INCOME INSECURITY: HOW HARD IS IT FOR YOU TO PAY FOR THE VERY BASICS LIKE FOOD, HOUSING, MEDICAL CARE, AND HEATING?: NOT VERY HARD

## 2024-01-31 SDOH — HEALTH STABILITY: PHYSICAL HEALTH: ON AVERAGE, HOW MANY DAYS PER WEEK DO YOU ENGAGE IN MODERATE TO STRENUOUS EXERCISE (LIKE A BRISK WALK)?: 3 DAYS

## 2024-01-31 SDOH — HEALTH STABILITY: PHYSICAL HEALTH: ON AVERAGE, HOW MANY MINUTES DO YOU ENGAGE IN EXERCISE AT THIS LEVEL?: 20 MIN

## 2024-01-31 SDOH — ECONOMIC STABILITY: FOOD INSECURITY: WITHIN THE PAST 12 MONTHS, THE FOOD YOU BOUGHT JUST DIDN'T LAST AND YOU DIDN'T HAVE MONEY TO GET MORE.: NEVER TRUE

## 2024-01-31 SDOH — ECONOMIC STABILITY: FOOD INSECURITY: WITHIN THE PAST 12 MONTHS, YOU WORRIED THAT YOUR FOOD WOULD RUN OUT BEFORE YOU GOT MONEY TO BUY MORE.: NEVER TRUE

## 2024-01-31 SDOH — ECONOMIC STABILITY: HOUSING INSECURITY
IN THE LAST 12 MONTHS, WAS THERE A TIME WHEN YOU DID NOT HAVE A STEADY PLACE TO SLEEP OR SLEPT IN A SHELTER (INCLUDING NOW)?: NO

## 2024-01-31 ASSESSMENT — LIFESTYLE VARIABLES
HOW MANY STANDARD DRINKS CONTAINING ALCOHOL DO YOU HAVE ON A TYPICAL DAY: 0
HOW OFTEN DO YOU HAVE SIX OR MORE DRINKS ON ONE OCCASION: 1
HOW OFTEN DO YOU HAVE A DRINK CONTAINING ALCOHOL: 1
HOW OFTEN DO YOU HAVE A DRINK CONTAINING ALCOHOL: NEVER
HOW MANY STANDARD DRINKS CONTAINING ALCOHOL DO YOU HAVE ON A TYPICAL DAY: PATIENT DOES NOT DRINK

## 2024-01-31 ASSESSMENT — PATIENT HEALTH QUESTIONNAIRE - PHQ9
SUM OF ALL RESPONSES TO PHQ QUESTIONS 1-9: 0
SUM OF ALL RESPONSES TO PHQ QUESTIONS 1-9: 0
1. LITTLE INTEREST OR PLEASURE IN DOING THINGS: 0
SUM OF ALL RESPONSES TO PHQ9 QUESTIONS 1 & 2: 0
2. FEELING DOWN, DEPRESSED OR HOPELESS: 0
SUM OF ALL RESPONSES TO PHQ QUESTIONS 1-9: 0
SUM OF ALL RESPONSES TO PHQ QUESTIONS 1-9: 0

## 2024-02-01 ENCOUNTER — TELEMEDICINE (OUTPATIENT)
Dept: FAMILY MEDICINE CLINIC | Age: 69
End: 2024-02-01
Payer: MEDICARE

## 2024-02-01 DIAGNOSIS — Z00.00 MEDICARE ANNUAL WELLNESS VISIT, SUBSEQUENT: Primary | ICD-10-CM

## 2024-02-01 PROCEDURE — G0439 PPPS, SUBSEQ VISIT: HCPCS | Performed by: FAMILY MEDICINE

## 2024-02-01 PROCEDURE — 3017F COLORECTAL CA SCREEN DOC REV: CPT | Performed by: FAMILY MEDICINE

## 2024-02-01 PROCEDURE — 1123F ACP DISCUSS/DSCN MKR DOCD: CPT | Performed by: FAMILY MEDICINE

## 2024-02-05 DIAGNOSIS — I48.11 LONGSTANDING PERSISTENT ATRIAL FIBRILLATION (HCC): ICD-10-CM

## 2024-02-06 RX ORDER — EPLERENONE 25 MG/1
TABLET, FILM COATED ORAL
Qty: 90 TABLET | Refills: 2 | Status: SHIPPED | OUTPATIENT
Start: 2024-02-06

## 2024-02-10 DIAGNOSIS — I10 ESSENTIAL HYPERTENSION: ICD-10-CM

## 2024-02-12 RX ORDER — METOPROLOL SUCCINATE 50 MG/1
TABLET, EXTENDED RELEASE ORAL
Qty: 180 TABLET | Refills: 3 | Status: SHIPPED | OUTPATIENT
Start: 2024-02-12

## 2024-02-29 NOTE — PROGRESS NOTES
distress and appears stated age  HEENT: PERRL, no cervical lymphadenopathy. No masses palpable. Normal oral mucosa  Respiratory:  Normal excursion and expansion without use of accessory muscles  Resp auscultation: Normal breath sounds without wheezing, rhonchi, and rales  Cardiovascular:  The apical impulse is not displaced  Heart tones are crisp and normal. irregular S1 and S2.  Jugular venous pulsation Normal  The carotid upstroke is normal in amplitude and contour without delay or bruit  Peripheral pulses are symmetrical and full   Abdomen:  No masses or tenderness  Bowel sounds present  Extremities:   No cyanosis or clubbing   No lower extremity edema   Skin: warm and dry  Neurological:  Alert and oriented  Moves all extremities well  No abnormalities of mood, affect, memory, mentation, or behavior are noted    DATA:    ECG 1/6/2022:   bpm    Echo 10/2019:     Summary   Limited echo for LV function and atrial size.      Normal systolic function with an estimated ejection fraction of 55%.   Upper limits of normal left ventricle size.   There is mild concentric left ventricular hypertrophy.   No regional wall motion abnormalities are seen.   Normal left ventricular diastolic filling pressure.   The left atrium is mildly dilated.   The right atrium is moderately dilated.   Cath LV gram on 4/2019 showed EF of 40%.      All labs and testing reviewed.  CARDIOLOGY LABS:   CBC: No results for input(s): \"WBC\", \"HGB\", \"HCT\", \"PLT\" in the last 72 hours.  BMP: No results for input(s): \"NA\", \"K\", \"CO2\", \"BUN\", \"CREATININE\", \"LABGLOM\", \"GLUCOSE\" in the last 72 hours.  PT/INR: No results for input(s): \"PROTIME\", \"INR\" in the last 72 hours.  APTT:No results for input(s): \"APTT\" in the last 72 hours.  FASTING LIPID PANEL:  Lab Results   Component Value Date/Time    HDL 42 03/04/2024 09:33 AM    HDL 34 03/28/2012 07:19 AM    LDLCALC 87 03/04/2024 09:33 AM    TRIG 144 04/23/2020 09:22 AM     LIVER PROFILE:No results for

## 2024-03-04 DIAGNOSIS — I10 ESSENTIAL HYPERTENSION: ICD-10-CM

## 2024-03-04 LAB
ALBUMIN SERPL-MCNC: 4 G/DL (ref 3.4–5)
ALBUMIN/GLOB SERPL: 1.9 {RATIO} (ref 1.1–2.2)
ALP SERPL-CCNC: 73 U/L (ref 40–129)
ALT SERPL-CCNC: 23 U/L (ref 10–40)
ANION GAP SERPL CALCULATED.3IONS-SCNC: 12 MMOL/L (ref 3–16)
AST SERPL-CCNC: 17 U/L (ref 15–37)
BASOPHILS # BLD: 0 K/UL (ref 0–0.2)
BASOPHILS NFR BLD: 0.8 %
BILIRUB SERPL-MCNC: 0.8 MG/DL (ref 0–1)
BUN SERPL-MCNC: 31 MG/DL (ref 7–20)
CALCIUM SERPL-MCNC: 9.4 MG/DL (ref 8.3–10.6)
CHLORIDE SERPL-SCNC: 105 MMOL/L (ref 99–110)
CHOLEST SERPL-MCNC: 156 MG/DL (ref 0–199)
CO2 SERPL-SCNC: 26 MMOL/L (ref 21–32)
CREAT SERPL-MCNC: 1.4 MG/DL (ref 0.8–1.3)
DEPRECATED RDW RBC AUTO: 14.5 % (ref 12.4–15.4)
EOSINOPHIL # BLD: 0.1 K/UL (ref 0–0.6)
EOSINOPHIL NFR BLD: 2.5 %
GFR SERPLBLD CREATININE-BSD FMLA CKD-EPI: 54 ML/MIN/{1.73_M2}
GLUCOSE SERPL-MCNC: 108 MG/DL (ref 70–99)
HCT VFR BLD AUTO: 45.3 % (ref 40.5–52.5)
HDLC SERPL-MCNC: 42 MG/DL (ref 40–60)
HGB BLD-MCNC: 14.9 G/DL (ref 13.5–17.5)
LDL CHOLESTEROL CALCULATED: 87 MG/DL
LYMPHOCYTES # BLD: 1.5 K/UL (ref 1–5.1)
LYMPHOCYTES NFR BLD: 25.3 %
MCH RBC QN AUTO: 29.4 PG (ref 26–34)
MCHC RBC AUTO-ENTMCNC: 32.8 G/DL (ref 31–36)
MCV RBC AUTO: 89.4 FL (ref 80–100)
MONOCYTES # BLD: 0.6 K/UL (ref 0–1.3)
MONOCYTES NFR BLD: 9.8 %
NEUTROPHILS # BLD: 3.6 K/UL (ref 1.7–7.7)
NEUTROPHILS NFR BLD: 61.6 %
PLATELET # BLD AUTO: 205 K/UL (ref 135–450)
PMV BLD AUTO: 9 FL (ref 5–10.5)
POTASSIUM SERPL-SCNC: 4.2 MMOL/L (ref 3.5–5.1)
PROT SERPL-MCNC: 6.1 G/DL (ref 6.4–8.2)
RBC # BLD AUTO: 5.06 M/UL (ref 4.2–5.9)
SODIUM SERPL-SCNC: 143 MMOL/L (ref 136–145)
TRIGL SERPL-MCNC: 137 MG/DL (ref 0–150)
TSH SERPL DL<=0.005 MIU/L-ACNC: 1.04 UIU/ML (ref 0.27–4.2)
VLDLC SERPL CALC-MCNC: 27 MG/DL
WBC # BLD AUTO: 5.9 K/UL (ref 4–11)

## 2024-03-05 ENCOUNTER — TELEPHONE (OUTPATIENT)
Dept: CARDIOLOGY CLINIC | Age: 69
End: 2024-03-05

## 2024-03-05 NOTE — TELEPHONE ENCOUNTER
----- Message from Josiah Lopez MD sent at 3/5/2024  1:55 PM EST -----  Kidney function slightly weak but stable, otherwise blood test looks good.  ----- Message -----  From: [facility] Incoming Lab Results From Soft (Epic Adt)  Sent: 3/4/2024   5:53 PM EST  To: Josiah Lopez MD

## 2024-03-07 ENCOUNTER — OFFICE VISIT (OUTPATIENT)
Dept: CARDIOLOGY CLINIC | Age: 69
End: 2024-03-07
Payer: MEDICARE

## 2024-03-07 VITALS
BODY MASS INDEX: 40.43 KG/M2 | WEIGHT: 315 LBS | OXYGEN SATURATION: 97 % | DIASTOLIC BLOOD PRESSURE: 78 MMHG | HEIGHT: 74 IN | HEART RATE: 79 BPM | SYSTOLIC BLOOD PRESSURE: 118 MMHG

## 2024-03-07 DIAGNOSIS — E66.01 OBESITY, CLASS III, BMI 40-49.9 (MORBID OBESITY) (HCC): ICD-10-CM

## 2024-03-07 DIAGNOSIS — I48.19 PERSISTENT ATRIAL FIBRILLATION (HCC): Primary | ICD-10-CM

## 2024-03-07 DIAGNOSIS — I50.22 CHRONIC SYSTOLIC CONGESTIVE HEART FAILURE (HCC): ICD-10-CM

## 2024-03-07 PROCEDURE — G8484 FLU IMMUNIZE NO ADMIN: HCPCS | Performed by: NURSE PRACTITIONER

## 2024-03-07 PROCEDURE — G8427 DOCREV CUR MEDS BY ELIG CLIN: HCPCS | Performed by: NURSE PRACTITIONER

## 2024-03-07 PROCEDURE — 1036F TOBACCO NON-USER: CPT | Performed by: NURSE PRACTITIONER

## 2024-03-07 PROCEDURE — 3078F DIAST BP <80 MM HG: CPT | Performed by: NURSE PRACTITIONER

## 2024-03-07 PROCEDURE — G8417 CALC BMI ABV UP PARAM F/U: HCPCS | Performed by: NURSE PRACTITIONER

## 2024-03-07 PROCEDURE — 3017F COLORECTAL CA SCREEN DOC REV: CPT | Performed by: NURSE PRACTITIONER

## 2024-03-07 PROCEDURE — 1123F ACP DISCUSS/DSCN MKR DOCD: CPT | Performed by: NURSE PRACTITIONER

## 2024-03-07 PROCEDURE — 99214 OFFICE O/P EST MOD 30 MIN: CPT | Performed by: NURSE PRACTITIONER

## 2024-03-07 PROCEDURE — 3074F SYST BP LT 130 MM HG: CPT | Performed by: NURSE PRACTITIONER

## 2024-03-07 NOTE — PATIENT INSTRUCTIONS
No changes today     Monitor BP and heart rate at home and call if consistently out of goal ranges    Follow up in one year or sooner if needed

## 2024-04-05 ENCOUNTER — OFFICE VISIT (OUTPATIENT)
Dept: CARDIOLOGY CLINIC | Age: 69
End: 2024-04-05
Payer: MEDICARE

## 2024-04-05 VITALS
SYSTOLIC BLOOD PRESSURE: 108 MMHG | WEIGHT: 315 LBS | HEART RATE: 89 BPM | BODY MASS INDEX: 40.43 KG/M2 | DIASTOLIC BLOOD PRESSURE: 64 MMHG | OXYGEN SATURATION: 98 % | HEIGHT: 74 IN

## 2024-04-05 DIAGNOSIS — I10 ESSENTIAL HYPERTENSION: ICD-10-CM

## 2024-04-05 DIAGNOSIS — E78.2 MIXED HYPERLIPIDEMIA: ICD-10-CM

## 2024-04-05 DIAGNOSIS — I48.21 PERMANENT ATRIAL FIBRILLATION (HCC): ICD-10-CM

## 2024-04-05 DIAGNOSIS — I50.42 CHRONIC COMBINED SYSTOLIC AND DIASTOLIC CONGESTIVE HEART FAILURE (HCC): Primary | ICD-10-CM

## 2024-04-05 DIAGNOSIS — N18.2 CKD (CHRONIC KIDNEY DISEASE) STAGE 2, GFR 60-89 ML/MIN: ICD-10-CM

## 2024-04-05 DIAGNOSIS — I42.8 NONISCHEMIC CARDIOMYOPATHY (HCC): ICD-10-CM

## 2024-04-05 DIAGNOSIS — E66.01 OBESITY, CLASS III, BMI 40-49.9 (MORBID OBESITY) (HCC): ICD-10-CM

## 2024-04-05 DIAGNOSIS — G47.33 OSA ON CPAP: ICD-10-CM

## 2024-04-05 PROCEDURE — G8427 DOCREV CUR MEDS BY ELIG CLIN: HCPCS | Performed by: INTERNAL MEDICINE

## 2024-04-05 PROCEDURE — G8417 CALC BMI ABV UP PARAM F/U: HCPCS | Performed by: INTERNAL MEDICINE

## 2024-04-05 PROCEDURE — 3017F COLORECTAL CA SCREEN DOC REV: CPT | Performed by: INTERNAL MEDICINE

## 2024-04-05 PROCEDURE — 1036F TOBACCO NON-USER: CPT | Performed by: INTERNAL MEDICINE

## 2024-04-05 PROCEDURE — 99214 OFFICE O/P EST MOD 30 MIN: CPT | Performed by: INTERNAL MEDICINE

## 2024-04-05 PROCEDURE — 1123F ACP DISCUSS/DSCN MKR DOCD: CPT | Performed by: INTERNAL MEDICINE

## 2024-04-05 PROCEDURE — 3074F SYST BP LT 130 MM HG: CPT | Performed by: INTERNAL MEDICINE

## 2024-04-05 PROCEDURE — 3078F DIAST BP <80 MM HG: CPT | Performed by: INTERNAL MEDICINE

## 2024-04-05 RX ORDER — FUROSEMIDE 40 MG/1
40 TABLET ORAL 2 TIMES DAILY
Qty: 180 TABLET | Refills: 3 | Status: SHIPPED | OUTPATIENT
Start: 2024-04-05

## 2024-04-05 RX ORDER — PRAVASTATIN SODIUM 40 MG
40 TABLET ORAL DAILY
Qty: 90 TABLET | Refills: 3 | Status: SHIPPED | OUTPATIENT
Start: 2024-04-05

## 2024-04-05 RX ORDER — LISINOPRIL 20 MG/1
20 TABLET ORAL 2 TIMES DAILY
Qty: 180 TABLET | Refills: 3 | Status: SHIPPED | OUTPATIENT
Start: 2024-04-05

## 2024-04-05 NOTE — PROGRESS NOTES
JESSICA on CPAP     Permanent atrial fibrillation (HCC)     CKD (chronic kidney disease) stage 2, GFR 60-89 ml/min          BP controlled  continue hydralazine lisinopril and metoprolol succinate   Afib controlled ventricular response anticoagulated and rate controlled with metoprolol and xarelto  No signs of heart failure on hydralazine  nitrates toprol and eplerenone     High cholesterol labs ok 3.27.23 continue pravastatin  Permanent  atrial fibrillation: stable he is rate controlled and anticoagulated with Xarelto              -s/p RFCA 8/2015 (no known recurrence until 12/2016)              -s/p DCCV on 1/10/2017, recurrent 7/2019, s/p CV 8/2019              -s/p RFCA 11/2019              -recurrent 4/2020 (asymptomatic, amiodarone stopped 6/2020)              -[id number] score 3 (HTN, age, and CHF)               NICM: resolved                     -EF 55% 10/2019 Patient is on Eplerenone  hydralazine lisinopriL  and beta blocker  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 Hill stable   Left renal atrophy: followed by urology   JESSICA: on CPAP since 2015, compliant       Plan:   Lab work from 04/2024 reviewed.   Current medications reviewed.  No changes at this time. Refills given as warranted.   Recommend new blood pressure machine. Try [employer] searching for [employer] brand with large cuff.   Follow up in 6 months     Scribe's attestation:  This note was scribed in the presence of Dr.Rakesh Lopez by Ines Bailey RN     I, Dr. Josiah Lopez, personally performed the services described in this documentation, as scribed by the above signed scribe in my presence. It is both accurate and complete to my knowledge. I agree with the details independently gathered by the clinical support staff, while the remaining scribed note accurately describes my personal service to the patient.        Josiah Lopez MD  [facility] [place]  [phone] [place] office  [phone] Main

## 2024-04-05 NOTE — PATIENT INSTRUCTIONS
Plan:   Lab work from 3/29/23 reviewed.   Current medications reviewed.  No changes at this time. Refills given as warranted.   Recommend new blood pressure machine. Try [employer] searching for Omron brand with large cuff.   Follow up in 6 months

## 2024-06-03 ENCOUNTER — OFFICE VISIT (OUTPATIENT)
Dept: FAMILY MEDICINE CLINIC | Age: 69
End: 2024-06-03
Payer: MEDICARE

## 2024-06-03 VITALS
HEART RATE: 84 BPM | BODY MASS INDEX: 40.43 KG/M2 | WEIGHT: 315 LBS | DIASTOLIC BLOOD PRESSURE: 72 MMHG | SYSTOLIC BLOOD PRESSURE: 138 MMHG | OXYGEN SATURATION: 95 % | HEIGHT: 74 IN

## 2024-06-03 DIAGNOSIS — N18.31 STAGE 3A CHRONIC KIDNEY DISEASE (HCC): ICD-10-CM

## 2024-06-03 DIAGNOSIS — Z12.11 SCREENING FOR COLON CANCER: ICD-10-CM

## 2024-06-03 DIAGNOSIS — I48.0 PAROXYSMAL ATRIAL FIBRILLATION (HCC): ICD-10-CM

## 2024-06-03 DIAGNOSIS — Z87.39 HISTORY OF GOUT: ICD-10-CM

## 2024-06-03 DIAGNOSIS — D68.69 SECONDARY HYPERCOAGULABLE STATE (HCC): ICD-10-CM

## 2024-06-03 DIAGNOSIS — I10 ESSENTIAL HYPERTENSION: Primary | ICD-10-CM

## 2024-06-03 DIAGNOSIS — R73.03 PREDIABETES: ICD-10-CM

## 2024-06-03 DIAGNOSIS — E78.2 MIXED HYPERLIPIDEMIA: ICD-10-CM

## 2024-06-03 LAB — HBA1C MFR BLD: 5.9 %

## 2024-06-03 PROCEDURE — 1036F TOBACCO NON-USER: CPT | Performed by: FAMILY MEDICINE

## 2024-06-03 PROCEDURE — 3078F DIAST BP <80 MM HG: CPT | Performed by: FAMILY MEDICINE

## 2024-06-03 PROCEDURE — G8417 CALC BMI ABV UP PARAM F/U: HCPCS | Performed by: FAMILY MEDICINE

## 2024-06-03 PROCEDURE — 99214 OFFICE O/P EST MOD 30 MIN: CPT | Performed by: FAMILY MEDICINE

## 2024-06-03 PROCEDURE — 83036 HEMOGLOBIN GLYCOSYLATED A1C: CPT | Performed by: FAMILY MEDICINE

## 2024-06-03 PROCEDURE — 1123F ACP DISCUSS/DSCN MKR DOCD: CPT | Performed by: FAMILY MEDICINE

## 2024-06-03 PROCEDURE — G8427 DOCREV CUR MEDS BY ELIG CLIN: HCPCS | Performed by: FAMILY MEDICINE

## 2024-06-03 PROCEDURE — 3075F SYST BP GE 130 - 139MM HG: CPT | Performed by: FAMILY MEDICINE

## 2024-06-03 PROCEDURE — 3017F COLORECTAL CA SCREEN DOC REV: CPT | Performed by: FAMILY MEDICINE

## 2024-06-03 RX ORDER — ALLOPURINOL 100 MG/1
100 TABLET ORAL DAILY
Qty: 90 TABLET | Refills: 3 | Status: SHIPPED | OUTPATIENT
Start: 2024-06-03

## 2024-06-03 ASSESSMENT — ANXIETY QUESTIONNAIRES
1. FEELING NERVOUS, ANXIOUS, OR ON EDGE: NOT AT ALL
4. TROUBLE RELAXING: NOT AT ALL
7. FEELING AFRAID AS IF SOMETHING AWFUL MIGHT HAPPEN: NOT AT ALL
IF YOU CHECKED OFF ANY PROBLEMS ON THIS QUESTIONNAIRE, HOW DIFFICULT HAVE THESE PROBLEMS MADE IT FOR YOU TO DO YOUR WORK, TAKE CARE OF THINGS AT HOME, OR GET ALONG WITH OTHER PEOPLE: NOT DIFFICULT AT ALL
GAD7 TOTAL SCORE: 0
3. WORRYING TOO MUCH ABOUT DIFFERENT THINGS: NOT AT ALL
2. NOT BEING ABLE TO STOP OR CONTROL WORRYING: NOT AT ALL
6. BECOMING EASILY ANNOYED OR IRRITABLE: NOT AT ALL
5. BEING SO RESTLESS THAT IT IS HARD TO SIT STILL: NOT AT ALL

## 2024-06-03 ASSESSMENT — PATIENT HEALTH QUESTIONNAIRE - PHQ9
SUM OF ALL RESPONSES TO PHQ QUESTIONS 1-9: 0
SUM OF ALL RESPONSES TO PHQ QUESTIONS 1-9: 0
2. FEELING DOWN, DEPRESSED OR HOPELESS: NOT AT ALL
SUM OF ALL RESPONSES TO PHQ QUESTIONS 1-9: 0
SUM OF ALL RESPONSES TO PHQ QUESTIONS 1-9: 0
SUM OF ALL RESPONSES TO PHQ9 QUESTIONS 1 & 2: 0
1. LITTLE INTEREST OR PLEASURE IN DOING THINGS: NOT AT ALL
DEPRESSION UNABLE TO ASSESS: FUNCTIONAL CAPACITY MOTIVATION LIMITS ACCURACY

## 2024-06-03 ASSESSMENT — ENCOUNTER SYMPTOMS
BLOOD IN STOOL: 0
NAUSEA: 1

## 2024-06-03 NOTE — PROGRESS NOTES
Mervin Spain is a 69 y.o. male    Chief Complaint   Patient presents with    1 Year Follow Up       HPI:    Hypertension   This is a chronic problem. The current episode started more than 1 year ago. The problem is unchanged. The problem is controlled. Risk factors for coronary artery disease include male gender. Past treatments include beta blockers, ACE inhibitors and direct vasodilators. The current treatment provides significant improvement. There are no compliance problems.    Hyperlipidemia   This is a chronic problem. The current episode started more than 1 year ago. The problem is controlled. Recent lipid tests were reviewed and are normal. He has no history of diabetes. Pertinent negatives include no myalgias. Current antihyperlipidemic treatment includes statins. The current treatment provides significant improvement of lipids. There are no compliance problems.  Risk factors for coronary artery disease include hypertension, male sex and obesity.     Atrial fibrillation, paroxysmal. This is a chronic issue. Had a recent flare in 2021. He was switched to Metoprolol from Carvedilol. No blood in the stool. No history of CVA.     CHF, systolic. This is a chronic condition. Leg edema has improved since his cardioversion/ablation. Takes Lasix and Eplerenone in the morning.      CKD. Patient sees Nephrology. 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    ROS:    Review of Systems   Gastrointestinal:  Positive for nausea. Negative for blood in stool.   Neurological:  Negative for numbness.       /72 (Site: Left Upper Arm, Position: Sitting, Cuff Size: Medium Adult)   Pulse 84   Ht 1.88 m (6' 2\")   Wt (!) 154.7 kg (341 lb)   SpO2 95%   BMI 43.78 kg/m²     Physical Exam:    Physical Exam  Constitutional:       General: He is not in acute distress.     Appearance: Normal appearance. He is obese. He is not ill-appearing or toxic-appearing.   HENT:      Head: Normocephalic.   Neurological:

## 2024-09-20 ENCOUNTER — OFFICE VISIT (OUTPATIENT)
Dept: PULMONOLOGY | Age: 69
End: 2024-09-20
Payer: MEDICARE

## 2024-09-20 VITALS
DIASTOLIC BLOOD PRESSURE: 80 MMHG | WEIGHT: 315 LBS | HEIGHT: 74 IN | OXYGEN SATURATION: 96 % | HEART RATE: 84 BPM | BODY MASS INDEX: 40.43 KG/M2 | SYSTOLIC BLOOD PRESSURE: 120 MMHG

## 2024-09-20 DIAGNOSIS — E66.01 OBESITY, CLASS III, BMI 40-49.9 (MORBID OBESITY) (HCC): ICD-10-CM

## 2024-09-20 DIAGNOSIS — I10 ESSENTIAL HYPERTENSION: ICD-10-CM

## 2024-09-20 DIAGNOSIS — Z71.89 CPAP USE COUNSELING: ICD-10-CM

## 2024-09-20 DIAGNOSIS — G47.33 SEVERE OBSTRUCTIVE SLEEP APNEA: Primary | ICD-10-CM

## 2024-09-20 DIAGNOSIS — R53.83 OTHER FATIGUE: ICD-10-CM

## 2024-09-20 PROCEDURE — 3017F COLORECTAL CA SCREEN DOC REV: CPT | Performed by: NURSE PRACTITIONER

## 2024-09-20 PROCEDURE — 3079F DIAST BP 80-89 MM HG: CPT | Performed by: NURSE PRACTITIONER

## 2024-09-20 PROCEDURE — 1036F TOBACCO NON-USER: CPT | Performed by: NURSE PRACTITIONER

## 2024-09-20 PROCEDURE — 3074F SYST BP LT 130 MM HG: CPT | Performed by: NURSE PRACTITIONER

## 2024-09-20 PROCEDURE — G8427 DOCREV CUR MEDS BY ELIG CLIN: HCPCS | Performed by: NURSE PRACTITIONER

## 2024-09-20 PROCEDURE — 99214 OFFICE O/P EST MOD 30 MIN: CPT | Performed by: NURSE PRACTITIONER

## 2024-09-20 PROCEDURE — 1123F ACP DISCUSS/DSCN MKR DOCD: CPT | Performed by: NURSE PRACTITIONER

## 2024-09-20 PROCEDURE — G8417 CALC BMI ABV UP PARAM F/U: HCPCS | Performed by: NURSE PRACTITIONER

## 2024-09-20 ASSESSMENT — SLEEP AND FATIGUE QUESTIONNAIRES
HOW LIKELY ARE YOU TO NOD OFF OR FALL ASLEEP WHILE WATCHING TV: SLIGHT CHANCE OF DOZING
HOW LIKELY ARE YOU TO NOD OFF OR FALL ASLEEP WHEN YOU ARE A PASSENGER IN A CAR FOR AN HOUR WITHOUT A BREAK: SLIGHT CHANCE OF DOZING
HOW LIKELY ARE YOU TO NOD OFF OR FALL ASLEEP WHILE WATCHING TV: HIGH CHANCE OF DOZING
HOW LIKELY ARE YOU TO NOD OFF OR FALL ASLEEP WHILE SITTING AND READING: SLIGHT CHANCE OF DOZING
HOW LIKELY ARE YOU TO NOD OFF OR FALL ASLEEP WHILE SITTING AND TALKING TO SOMEONE: SLIGHT CHANCE OF DOZING
HOW LIKELY ARE YOU TO NOD OFF OR FALL ASLEEP WHILE SITTING INACTIVE IN A PUBLIC PLACE: WOULD NEVER DOZE
HOW LIKELY ARE YOU TO NOD OFF OR FALL ASLEEP WHILE SITTING AND READING: HIGH CHANCE OF DOZING
HOW LIKELY ARE YOU TO NOD OFF OR FALL ASLEEP WHILE SITTING QUIETLY AFTER LUNCH WITHOUT ALCOHOL: HIGH CHANCE OF DOZING
HOW LIKELY ARE YOU TO NOD OFF OR FALL ASLEEP WHILE LYING DOWN TO REST IN THE AFTERNOON WHEN CIRCUMSTANCES PERMIT: SLIGHT CHANCE OF DOZING
HOW LIKELY ARE YOU TO NOD OFF OR FALL ASLEEP WHILE LYING DOWN TO REST IN THE AFTERNOON WHEN CIRCUMSTANCES PERMIT: SLIGHT CHANCE OF DOZING
ESS TOTAL SCORE: 7
HOW LIKELY ARE YOU TO NOD OFF OR FALL ASLEEP WHEN YOU ARE A PASSENGER IN A CAR FOR AN HOUR WITHOUT A BREAK: SLIGHT CHANCE OF DOZING
ESS TOTAL SCORE: 12
HOW LIKELY ARE YOU TO NOD OFF OR FALL ASLEEP WHILE SITTING AND TALKING TO SOMEONE: WOULD NEVER DOZE
HOW LIKELY ARE YOU TO NOD OFF OR FALL ASLEEP WHILE SITTING QUIETLY AFTER LUNCH WITHOUT ALCOHOL: MODERATE CHANCE OF DOZING
HOW LIKELY ARE YOU TO NOD OFF OR FALL ASLEEP IN A CAR, WHILE STOPPED FOR A FEW MINUTES IN TRAFFIC: WOULD NEVER DOZE
HOW LIKELY ARE YOU TO NOD OFF OR FALL ASLEEP WHILE SITTING INACTIVE IN A PUBLIC PLACE: SLIGHT CHANCE OF DOZING
HOW LIKELY ARE YOU TO NOD OFF OR FALL ASLEEP IN A CAR, WHILE STOPPED FOR A FEW MINUTES IN TRAFFIC: WOULD NEVER DOZE

## 2024-09-25 ENCOUNTER — OFFICE VISIT (OUTPATIENT)
Dept: CARDIOLOGY CLINIC | Age: 69
End: 2024-09-25
Payer: MEDICARE

## 2024-09-25 VITALS
WEIGHT: 315 LBS | HEIGHT: 74 IN | DIASTOLIC BLOOD PRESSURE: 72 MMHG | OXYGEN SATURATION: 97 % | BODY MASS INDEX: 40.43 KG/M2 | HEART RATE: 69 BPM | SYSTOLIC BLOOD PRESSURE: 130 MMHG

## 2024-09-25 DIAGNOSIS — I48.21 PERMANENT ATRIAL FIBRILLATION (HCC): ICD-10-CM

## 2024-09-25 DIAGNOSIS — I50.42 CHRONIC COMBINED SYSTOLIC AND DIASTOLIC CONGESTIVE HEART FAILURE (HCC): Primary | ICD-10-CM

## 2024-09-25 DIAGNOSIS — I42.8 NONISCHEMIC CARDIOMYOPATHY (HCC): ICD-10-CM

## 2024-09-25 DIAGNOSIS — E66.01 CLASS 3 SEVERE OBESITY DUE TO EXCESS CALORIES WITH SERIOUS COMORBIDITY AND BODY MASS INDEX (BMI) OF 40.0 TO 44.9 IN ADULT: ICD-10-CM

## 2024-09-25 DIAGNOSIS — E78.2 MIXED HYPERLIPIDEMIA: ICD-10-CM

## 2024-09-25 DIAGNOSIS — I10 ESSENTIAL HYPERTENSION: ICD-10-CM

## 2024-09-25 PROCEDURE — 3017F COLORECTAL CA SCREEN DOC REV: CPT | Performed by: INTERNAL MEDICINE

## 2024-09-25 PROCEDURE — G8427 DOCREV CUR MEDS BY ELIG CLIN: HCPCS | Performed by: INTERNAL MEDICINE

## 2024-09-25 PROCEDURE — 1123F ACP DISCUSS/DSCN MKR DOCD: CPT | Performed by: INTERNAL MEDICINE

## 2024-09-25 PROCEDURE — 99214 OFFICE O/P EST MOD 30 MIN: CPT | Performed by: INTERNAL MEDICINE

## 2024-09-25 PROCEDURE — G8417 CALC BMI ABV UP PARAM F/U: HCPCS | Performed by: INTERNAL MEDICINE

## 2024-09-25 PROCEDURE — 3078F DIAST BP <80 MM HG: CPT | Performed by: INTERNAL MEDICINE

## 2024-09-25 PROCEDURE — 3074F SYST BP LT 130 MM HG: CPT | Performed by: INTERNAL MEDICINE

## 2024-09-25 PROCEDURE — 1036F TOBACCO NON-USER: CPT | Performed by: INTERNAL MEDICINE

## 2024-09-25 RX ORDER — MULTIVIT-MIN/IRON/FOLIC ACID/K 18-600-40
CAPSULE ORAL
COMMUNITY

## 2024-11-11 RX ORDER — EPLERENONE 25 MG/1
TABLET, FILM COATED ORAL
Qty: 90 TABLET | Refills: 1 | Status: SHIPPED | OUTPATIENT
Start: 2024-11-11

## 2024-11-11 NOTE — TELEPHONE ENCOUNTER
Last Office Visit: 9/25/2024 Provider: SHIREEN  Is provider OOT? No    Next Office Visit: 3/25/2025 Provider: SHIREEN  **Has patient already had 30 day supply? No    LAST LABS:   CMP:   Lab Results   Component Value Date     10/09/2024    K 4.1 10/09/2024     10/09/2024    CO2 28 10/09/2024    BUN 32 (H) 10/09/2024    CREATININE 1.2 10/09/2024    GLUCOSE 103 (H) 10/09/2024    CALCIUM 9.4 10/09/2024    BILITOT 0.8 03/04/2024    ALKPHOS 73 03/04/2024    AST 17 03/04/2024    ALT 23 03/04/2024    LABGLOM 64 10/09/2024    GFRAA >60 06/22/2022    AGRATIO 1.9 03/04/2024    GLOB 2.7 06/14/2021     **Check Care Everywhere? N/A  **Lab orders needed? no -      Is encounter provider correct?   Yes  Does refill dosage match last filled?   Yes  Changes to script from Tele Encounters or LOV Plan?   no  Did you adjust dispensed amount or refills to reflect last and upcoming OV?  Yes    Requested Prescriptions     Pending Prescriptions Disp Refills    eplerenone (INSPRA) 25 MG tablet [Pharmacy Med Name: EPLERENONE 25 MG TABLET] 90 tablet 2     Sig: TAKE 1 TABLET BY MOUTH EVERY DAY

## 2024-11-25 NOTE — PROGRESS NOTES
Medicare Annual Wellness Visit    Mervin Spain is here for Medicare AWV    Assessment & Plan   Medicare annual wellness visit, subsequent  Recommendations for Preventive Services Due: see orders and patient instructions/AVS.  Recommended screening schedule for the next 5-10 years is provided to the patient in written form: see Patient Instructions/AVS.     No follow-ups on file.     Subjective       Patient's complete Health Risk Assessment and screening values have been reviewed and are found in Flowsheets. The following problems were reviewed today and where indicated follow up appointments were made and/or referrals ordered.    Positive Risk Factor Screenings with Interventions:    Fall Risk:  Do you feel unsteady or are you worried about falling? : (!) yes (arthritis in knee causes feeling Patient entered data at this time. Check the audit trail.)  2 or more falls in past year?: no  Fall with injury in past year?: no     Interventions:    Patient comments: doing physical therapy which is helping  See AVS for additional education material             Activity, Diet, and Weight:  On average, how many days per week do you engage in moderate to strenuous exercise (like a brisk walk)?: 3 days  On average, how many minutes do you engage in exercise at this level?: 20 min    Do you eat balanced/healthy meals regularly?: Yes    There is no height or weight on file to calculate BMI. (!) Abnormal    Obesity Interventions:  See AVS for additional education material                               Objective      Patient-Reported Vitals  Patient-Reported Systolic (Top): 121 mmHg  Patient-Reported Diastolic (Bottom): 81 mmHg  Patient-Reported Weight: 332lb  Patient-Reported Height: 6' 2\"              No Known Allergies  Prior to Visit Medications    Medication Sig Taking? Authorizing Provider   isosorbide mononitrate (IMDUR) 30 MG extended release tablet TAKE ONCE PER DAY Yes Josiah Lopez MD   hydrALAZINE (APRESOLINE) 25  eyeglasses

## 2025-01-01 DIAGNOSIS — I10 ESSENTIAL HYPERTENSION: ICD-10-CM

## 2025-01-02 RX ORDER — ISOSORBIDE MONONITRATE 30 MG/1
TABLET, EXTENDED RELEASE ORAL
Qty: 90 TABLET | Refills: 3 | Status: SHIPPED | OUTPATIENT
Start: 2025-01-02

## 2025-01-02 RX ORDER — HYDRALAZINE HYDROCHLORIDE 25 MG/1
TABLET, FILM COATED ORAL
Qty: 180 TABLET | Refills: 3 | Status: SHIPPED | OUTPATIENT
Start: 2025-01-02

## 2025-01-02 NOTE — TELEPHONE ENCOUNTER
Patient last seen on 11/9/24. Advised to follow up 6 months. Next appointment 2/26/25.     Lab Results   Component Value Date     10/09/2024    K 4.1 10/09/2024     10/09/2024    CO2 28 10/09/2024    BUN 32 (H) 10/09/2024    CREATININE 1.2 10/09/2024    GLUCOSE 103 (H) 10/09/2024    CALCIUM 9.4 10/09/2024    BILITOT 0.8 03/04/2024    ALKPHOS 73 03/04/2024    AST 17 03/04/2024    ALT 23 03/04/2024    LABGLOM 64 10/09/2024    GFRAA >60 06/22/2022    AGRATIO 1.9 03/04/2024    GLOB 2.7 06/14/2021       Lab Results   Component Value Date    WBC 5.9 03/04/2024    HGB 14.9 03/04/2024    HCT 45.3 03/04/2024    MCV 89.4 03/04/2024     03/04/2024

## 2025-02-07 DIAGNOSIS — I10 ESSENTIAL HYPERTENSION: ICD-10-CM

## 2025-02-07 DIAGNOSIS — E78.2 MIXED HYPERLIPIDEMIA: ICD-10-CM

## 2025-02-07 DIAGNOSIS — I42.8 NONISCHEMIC CARDIOMYOPATHY (HCC): ICD-10-CM

## 2025-02-07 LAB
ALBUMIN SERPL-MCNC: 4 G/DL (ref 3.4–5)
ALBUMIN/GLOB SERPL: 2 {RATIO} (ref 1.1–2.2)
ALP SERPL-CCNC: 75 U/L (ref 40–129)
ALT SERPL-CCNC: 31 U/L (ref 10–40)
ANION GAP SERPL CALCULATED.3IONS-SCNC: 9 MMOL/L (ref 3–16)
AST SERPL-CCNC: 20 U/L (ref 15–37)
BASOPHILS # BLD: 0.1 K/UL (ref 0–0.2)
BASOPHILS NFR BLD: 1 %
BILIRUB SERPL-MCNC: 0.6 MG/DL (ref 0–1)
BUN SERPL-MCNC: 33 MG/DL (ref 7–20)
CALCIUM SERPL-MCNC: 9.4 MG/DL (ref 8.3–10.6)
CHLORIDE SERPL-SCNC: 103 MMOL/L (ref 99–110)
CHOLEST SERPL-MCNC: 154 MG/DL (ref 0–199)
CO2 SERPL-SCNC: 29 MMOL/L (ref 21–32)
CREAT SERPL-MCNC: 1.4 MG/DL (ref 0.8–1.3)
DEPRECATED RDW RBC AUTO: 13.5 % (ref 12.4–15.4)
EOSINOPHIL # BLD: 0.1 K/UL (ref 0–0.6)
EOSINOPHIL NFR BLD: 1.9 %
GFR SERPLBLD CREATININE-BSD FMLA CKD-EPI: 53 ML/MIN/{1.73_M2}
GLUCOSE SERPL-MCNC: 107 MG/DL (ref 70–99)
HCT VFR BLD AUTO: 46 % (ref 40.5–52.5)
HDLC SERPL-MCNC: 44 MG/DL (ref 40–60)
HGB BLD-MCNC: 15.2 G/DL (ref 13.5–17.5)
LDL CHOLESTEROL: 82 MG/DL
LYMPHOCYTES # BLD: 1.5 K/UL (ref 1–5.1)
LYMPHOCYTES NFR BLD: 22.5 %
MCH RBC QN AUTO: 29.8 PG (ref 26–34)
MCHC RBC AUTO-ENTMCNC: 32.9 G/DL (ref 31–36)
MCV RBC AUTO: 90.6 FL (ref 80–100)
MONOCYTES # BLD: 0.6 K/UL (ref 0–1.3)
MONOCYTES NFR BLD: 8.2 %
NEUTROPHILS # BLD: 4.4 K/UL (ref 1.7–7.7)
NEUTROPHILS NFR BLD: 66.4 %
PLATELET # BLD AUTO: 215 K/UL (ref 135–450)
PMV BLD AUTO: 9.3 FL (ref 5–10.5)
POTASSIUM SERPL-SCNC: 4.7 MMOL/L (ref 3.5–5.1)
PROT SERPL-MCNC: 6 G/DL (ref 6.4–8.2)
RBC # BLD AUTO: 5.08 M/UL (ref 4.2–5.9)
SODIUM SERPL-SCNC: 141 MMOL/L (ref 136–145)
TRIGL SERPL-MCNC: 140 MG/DL (ref 0–150)
TSH SERPL DL<=0.005 MIU/L-ACNC: 1.29 UIU/ML (ref 0.27–4.2)
VLDLC SERPL CALC-MCNC: 28 MG/DL
WBC # BLD AUTO: 6.7 K/UL (ref 4–11)

## 2025-02-08 DIAGNOSIS — I10 ESSENTIAL HYPERTENSION: ICD-10-CM

## 2025-02-10 ENCOUNTER — TELEPHONE (OUTPATIENT)
Dept: CARDIOLOGY CLINIC | Age: 70
End: 2025-02-10

## 2025-02-10 DIAGNOSIS — I48.11 LONGSTANDING PERSISTENT ATRIAL FIBRILLATION (HCC): ICD-10-CM

## 2025-02-10 RX ORDER — METOPROLOL SUCCINATE 50 MG/1
TABLET, EXTENDED RELEASE ORAL
Qty: 180 TABLET | Refills: 0 | Status: SHIPPED | OUTPATIENT
Start: 2025-02-10

## 2025-02-10 NOTE — TELEPHONE ENCOUNTER
LOV 9/25/24  NOV 2/26/25    Lab Results   Component Value Date    WBC 6.7 02/07/2025    HGB 15.2 02/07/2025    HCT 46.0 02/07/2025    MCV 90.6 02/07/2025     02/07/2025

## 2025-02-10 NOTE — TELEPHONE ENCOUNTER
Pt called asking for a refill of Xarelto 20 mg.  Preferred pharmacy   [id number] - [place], [place] - [address] P [phone] - F [phone]  [address] [facility] [address]  Phone: [phone]  Fax: [phone]     LOV 9/25/2024 SHIREEN KINSEY 2/26/2025 SHIREEN

## 2025-02-11 NOTE — RESULT ENCOUNTER NOTE
Josiah Lopez MD  P [facility] Staff  Blood test shows kidney function is slightly weak but stable. Rest of it looks good.

## 2025-02-14 NOTE — PROGRESS NOTES
[facility]  Follow up Note              February 14, 2025    Subjective:   Mervin Spain is a 69 y.o. male who presents for cardiac follow up of permanent AF,  NICM, chronic combined CHF, HLD and HTN. He has JESSICA uses CPAP    C/O:      [place]:    Today 2/26/25, ***      PMH:  Mervin Spain is a 69 y.o. male with a history of AF, SB, NICM, chronic combined CHF, HLD, JESSICA and CKD.  He reports that in 2007 he had a CV for AF. In 3/2015, he had another CV. In 8/2015, he he had RFCA of AF. In 10/2016, amiodarone was discontinued. In 12/2016, AF was recurrent and amiodarone was restarted. In 1/2017, he had a cardioversion. In 7/2019, he had a LHC that showed normal coronaries and NICM. In 8/2019, he had another CV. In 10/2019, echo showed an EF of 55%. In 11/2019, he underwent a repeat ablation for AF. In 4/2020, he was back in AF and due to lack of symptoms, amiodarone was stopped.     OV 5/24/22 he states it is difficult at times with his house being so quiet. Lost spouse recent past, He has 4 grandchildren and follows them to ball games. He monitors his BP at home. His BP ranges 120's-130's over 70's-80's.   OV 10/03/23, Patient states he has just got a new CPAP machine. He states he has used nightly since 2015. Patient brought BP readings form home with average readings of 115-120/70-75.     Past Medical History:   has a past medical history of Atrial fibrillation (HCC), Cardiomyopathy (HCC), Chest pain, CHF (congestive heart failure) (HCC), CKD (chronic kidney disease) stage 3, GFR 30-59 ml/min (HCC), Family history of early CAD, Hyperlipidemia, Hypertension, JESSICA (obstructive sleep apnea), and S/P ablation of atrial fibrillation.    Past Surgical History:   has a past surgical history that includes back surgery; hernia repair; Knee arthroscopy (Left, 03/20/2012); Cardioversion (03/23/2015); transesophageal echocardiogram (08/11/2015); ablation of dysrhythmic focus (08/17/2015); Cardioversion (01/10/2017);

## 2025-02-17 SDOH — ECONOMIC STABILITY: FOOD INSECURITY: WITHIN THE PAST 12 MONTHS, YOU WORRIED THAT YOUR FOOD WOULD RUN OUT BEFORE YOU GOT MONEY TO BUY MORE.: NEVER TRUE

## 2025-02-17 SDOH — HEALTH STABILITY: PHYSICAL HEALTH: ON AVERAGE, HOW MANY DAYS PER WEEK DO YOU ENGAGE IN MODERATE TO STRENUOUS EXERCISE (LIKE A BRISK WALK)?: 2 DAYS

## 2025-02-17 SDOH — ECONOMIC STABILITY: INCOME INSECURITY: IN THE LAST 12 MONTHS, WAS THERE A TIME WHEN YOU WERE NOT ABLE TO PAY THE MORTGAGE OR RENT ON TIME?: NO

## 2025-02-17 SDOH — ECONOMIC STABILITY: FOOD INSECURITY: WITHIN THE PAST 12 MONTHS, THE FOOD YOU BOUGHT JUST DIDN'T LAST AND YOU DIDN'T HAVE MONEY TO GET MORE.: NEVER TRUE

## 2025-02-17 SDOH — HEALTH STABILITY: PHYSICAL HEALTH: ON AVERAGE, HOW MANY MINUTES DO YOU ENGAGE IN EXERCISE AT THIS LEVEL?: 10 MIN

## 2025-02-17 ASSESSMENT — LIFESTYLE VARIABLES
HOW OFTEN DO YOU HAVE SIX OR MORE DRINKS ON ONE OCCASION: 1
HOW OFTEN DO YOU HAVE A DRINK CONTAINING ALCOHOL: 1
HOW OFTEN DO YOU HAVE A DRINK CONTAINING ALCOHOL: NEVER
HOW MANY STANDARD DRINKS CONTAINING ALCOHOL DO YOU HAVE ON A TYPICAL DAY: 0
HOW MANY STANDARD DRINKS CONTAINING ALCOHOL DO YOU HAVE ON A TYPICAL DAY: PATIENT DOES NOT DRINK

## 2025-02-17 ASSESSMENT — PATIENT HEALTH QUESTIONNAIRE - PHQ9
2. FEELING DOWN, DEPRESSED OR HOPELESS: NOT AT ALL
SUM OF ALL RESPONSES TO PHQ QUESTIONS 1-9: 0
SUM OF ALL RESPONSES TO PHQ QUESTIONS 1-9: 0
1. LITTLE INTEREST OR PLEASURE IN DOING THINGS: NOT AT ALL
SUM OF ALL RESPONSES TO PHQ QUESTIONS 1-9: 0
SUM OF ALL RESPONSES TO PHQ9 QUESTIONS 1 & 2: 0
SUM OF ALL RESPONSES TO PHQ QUESTIONS 1-9: 0

## 2025-02-20 ENCOUNTER — TELEMEDICINE (OUTPATIENT)
Dept: FAMILY MEDICINE CLINIC | Age: 70
End: 2025-02-20

## 2025-02-20 DIAGNOSIS — Z00.00 MEDICARE ANNUAL WELLNESS VISIT, SUBSEQUENT: Primary | ICD-10-CM

## 2025-02-20 NOTE — PROGRESS NOTES
Medicare Annual Wellness Visit    Mervin Spain is here for Medicare AWV    Assessment & Plan   Medicare annual wellness visit, subsequent     No follow-ups on file.     Subjective       Patient's complete Health Risk Assessment and screening values have been reviewed and are found in Flowsheets. The following problems were reviewed today and where indicated follow up appointments were made and/or referrals ordered.    Positive Risk Factor Screenings with Interventions:              Inactivity:  On average, how many days per week do you engage in moderate to strenuous exercise (like a brisk walk)?: 2 days (!) Abnormal  On average, how many minutes do you engage in exercise at this level?: 10 min  Interventions:  See AVS for additional education material     Abnormal BMI (obese):  There is no height or weight on file to calculate BMI. (!) Abnormal  Interventions:  See AVS for additional education material           Safety:  Do you have any tripping hazards - loose or unsecured carpets or rugs?: (!) Yes  Do you have non-slip mats or non-slip surfaces or shower bars or grab bars in your shower or bathtub?: (!) No  Interventions:  See AVS for additional education material                   Objective    Patient-Reported Vitals  Patient-Reported Systolic (Top): 104 mmHg  Patient-Reported Diastolic (Bottom): 76 mmHg  Patient-Reported Pulse: 82  Patient-Reported Weight: 330LB  Patient-Reported Height: 6' 2\"               No Known Allergies  Prior to Visit Medications    Medication Sig Taking? Authorizing Provider   metoprolol succinate (TOPROL XL) 50 MG extended release tablet TAKE 1 TABLET BY MOUTH TWICE A DAY Yes Josiah Lopez MD   rivaroxaban (XARELTO) 20 MG TABS tablet TAKE 1 TABLET BY MOUTH EVERY DAY Yes Josiah Lopez MD   hydrALAZINE (APRESOLINE) 25 MG tablet TAKE 1 TABLET BY MOUTH TWICE A DAY Yes Josiah Lopez MD   isosorbide mononitrate (IMDUR) 30 MG extended release tablet TAKE 1 TABLET BY MOUTH EVERY DAY

## 2025-02-26 ENCOUNTER — OFFICE VISIT (OUTPATIENT)
Dept: CARDIOLOGY CLINIC | Age: 70
End: 2025-02-26

## 2025-02-26 VITALS
DIASTOLIC BLOOD PRESSURE: 78 MMHG | SYSTOLIC BLOOD PRESSURE: 110 MMHG | OXYGEN SATURATION: 96 % | HEART RATE: 93 BPM | BODY MASS INDEX: 40.43 KG/M2 | WEIGHT: 315 LBS | HEIGHT: 74 IN

## 2025-02-26 DIAGNOSIS — I50.42 CHRONIC COMBINED SYSTOLIC AND DIASTOLIC CONGESTIVE HEART FAILURE (HCC): ICD-10-CM

## 2025-02-26 DIAGNOSIS — I10 ESSENTIAL HYPERTENSION: Primary | ICD-10-CM

## 2025-02-26 DIAGNOSIS — I48.21 PERMANENT ATRIAL FIBRILLATION (HCC): ICD-10-CM

## 2025-02-26 DIAGNOSIS — N18.31 STAGE 3A CHRONIC KIDNEY DISEASE (HCC): ICD-10-CM

## 2025-02-26 DIAGNOSIS — G47.33 SEVERE OBSTRUCTIVE SLEEP APNEA: ICD-10-CM

## 2025-02-26 DIAGNOSIS — E78.2 MIXED HYPERLIPIDEMIA: ICD-10-CM

## 2025-02-26 DIAGNOSIS — I42.8 NONISCHEMIC CARDIOMYOPATHY (HCC): ICD-10-CM

## 2025-02-26 PROBLEM — I50.22 CHRONIC SYSTOLIC (CONGESTIVE) HEART FAILURE (HCC): Status: RESOLVED | Noted: 2022-05-24 | Resolved: 2025-02-26

## 2025-02-26 RX ORDER — LISINOPRIL 20 MG/1
20 TABLET ORAL 2 TIMES DAILY
Qty: 180 TABLET | Refills: 3 | Status: SHIPPED | OUTPATIENT
Start: 2025-02-26

## 2025-02-26 RX ORDER — EPLERENONE 25 MG/1
25 TABLET ORAL DAILY
Qty: 90 TABLET | Refills: 3 | Status: SHIPPED | OUTPATIENT
Start: 2025-02-26

## 2025-02-26 RX ORDER — PRAVASTATIN SODIUM 40 MG
40 TABLET ORAL DAILY
Qty: 90 TABLET | Refills: 3 | Status: SHIPPED | OUTPATIENT
Start: 2025-02-26

## 2025-02-26 RX ORDER — FUROSEMIDE 40 MG/1
40 TABLET ORAL 2 TIMES DAILY
Qty: 180 TABLET | Refills: 3 | Status: SHIPPED | OUTPATIENT
Start: 2025-02-26

## 2025-02-26 RX ORDER — METOPROLOL SUCCINATE 50 MG/1
TABLET, EXTENDED RELEASE ORAL
Qty: 180 TABLET | Refills: 3 | Status: SHIPPED | OUTPATIENT
Start: 2025-02-26

## 2025-02-26 NOTE — PROGRESS NOTES
[facility]  Follow up Note              February 26, 2025    Subjective:   Mervin Spain is a 69 y.o. male who presents for cardiac follow up of permanent AF,  NICM, chronic combined CHF, HLD and HTN. He has JESSICA uses CPAP CKD 3a     C/O: no complaints     [place]:    Today 2/26/25, He reports he is doing well from a cardiac standpoint. He is dealing with a burst pipe in his basement. He uses a CPAP regularly. He checks his blood pressure at home Runs mostly below 130/80  He is compliant with meds and no side effects reported.    PMH:  Mervin Spain is a 69 y.o. male with a history of AF, SB, NICM, chronic combined CHF, HLD, JESSICA and CKD.  He reports that in 2007 he had a CV for AF. In 3/2015, he had another CV. In 8/2015, he he had RFCA of AF. In 10/2016, amiodarone was discontinued. In 12/2016, AF was recurrent and amiodarone was restarted. In 1/2017, he had a cardioversion. In 7/2019, he had a LHC that showed normal coronaries and NICM. In 8/2019, he had another CV. In 10/2019, echo showed an EF of 55%. In 11/2019, he underwent a repeat ablation for AF. In 4/2020, he was back in AF and due to lack of symptoms, amiodarone was stopped.     OV 5/24/22 he states it is difficult at times with his house being so quiet. Lost spouse recent past, He has 4 grandchildren and follows them to ball games. He monitors his BP at home. His BP ranges 120's-130's over 70's-80's.   OV 10/03/23, Patient states he has just got a new CPAP machine. He states he has used nightly since 2015. Patient brought BP readings form home with average readings of 115-120/70-75.     Past Medical History:   has a past medical history of Atrial fibrillation (HCC), Cardiomyopathy (HCC), Chest pain, CHF (congestive heart failure) (HCC), CKD (chronic kidney disease) stage 3, GFR 30-59 ml/min (HCC), Family history of early CAD, Hyperlipidemia, Hypertension, JESSICA (obstructive sleep apnea), and S/P ablation of atrial fibrillation.    Past Surgical

## 2025-02-26 NOTE — PATIENT INSTRUCTIONS
Plan:  Labs reviewed in epic and discussed with patient.   Current medications reviewed.  Refills given as warranted.  Continue to monitor blood pressure at home.   Recommend activity as tolerated.   Follow up with me in 6 months

## 2025-04-15 ENCOUNTER — RESULTS FOLLOW-UP (OUTPATIENT)
Dept: NEPHROLOGY | Age: 70
End: 2025-04-15

## 2025-06-06 ENCOUNTER — OFFICE VISIT (OUTPATIENT)
Dept: FAMILY MEDICINE CLINIC | Age: 70
End: 2025-06-06
Payer: MEDICARE

## 2025-06-06 VITALS
DIASTOLIC BLOOD PRESSURE: 84 MMHG | WEIGHT: 315 LBS | HEIGHT: 74 IN | SYSTOLIC BLOOD PRESSURE: 130 MMHG | OXYGEN SATURATION: 98 % | HEART RATE: 53 BPM | BODY MASS INDEX: 40.43 KG/M2

## 2025-06-06 DIAGNOSIS — R73.03 PREDIABETES: ICD-10-CM

## 2025-06-06 DIAGNOSIS — I10 ESSENTIAL HYPERTENSION: Primary | ICD-10-CM

## 2025-06-06 DIAGNOSIS — E78.2 MIXED HYPERLIPIDEMIA: ICD-10-CM

## 2025-06-06 DIAGNOSIS — N18.31 STAGE 3A CHRONIC KIDNEY DISEASE (HCC): ICD-10-CM

## 2025-06-06 DIAGNOSIS — I48.0 PAROXYSMAL ATRIAL FIBRILLATION (HCC): ICD-10-CM

## 2025-06-06 DIAGNOSIS — Z87.39 HISTORY OF GOUT: ICD-10-CM

## 2025-06-06 PROCEDURE — 3075F SYST BP GE 130 - 139MM HG: CPT | Performed by: FAMILY MEDICINE

## 2025-06-06 PROCEDURE — 1123F ACP DISCUSS/DSCN MKR DOCD: CPT | Performed by: FAMILY MEDICINE

## 2025-06-06 PROCEDURE — G8427 DOCREV CUR MEDS BY ELIG CLIN: HCPCS | Performed by: FAMILY MEDICINE

## 2025-06-06 PROCEDURE — 1036F TOBACCO NON-USER: CPT | Performed by: FAMILY MEDICINE

## 2025-06-06 PROCEDURE — G2211 COMPLEX E/M VISIT ADD ON: HCPCS | Performed by: FAMILY MEDICINE

## 2025-06-06 PROCEDURE — 3079F DIAST BP 80-89 MM HG: CPT | Performed by: FAMILY MEDICINE

## 2025-06-06 PROCEDURE — 3017F COLORECTAL CA SCREEN DOC REV: CPT | Performed by: FAMILY MEDICINE

## 2025-06-06 PROCEDURE — 1159F MED LIST DOCD IN RCRD: CPT | Performed by: FAMILY MEDICINE

## 2025-06-06 PROCEDURE — G8417 CALC BMI ABV UP PARAM F/U: HCPCS | Performed by: FAMILY MEDICINE

## 2025-06-06 PROCEDURE — 99214 OFFICE O/P EST MOD 30 MIN: CPT | Performed by: FAMILY MEDICINE

## 2025-06-06 RX ORDER — ALLOPURINOL 100 MG/1
100 TABLET ORAL DAILY
Qty: 90 TABLET | Refills: 3 | Status: SHIPPED | OUTPATIENT
Start: 2025-06-06

## 2025-06-06 SDOH — ECONOMIC STABILITY: FOOD INSECURITY: WITHIN THE PAST 12 MONTHS, YOU WORRIED THAT YOUR FOOD WOULD RUN OUT BEFORE YOU GOT MONEY TO BUY MORE.: NEVER TRUE

## 2025-06-06 SDOH — ECONOMIC STABILITY: FOOD INSECURITY: WITHIN THE PAST 12 MONTHS, THE FOOD YOU BOUGHT JUST DIDN'T LAST AND YOU DIDN'T HAVE MONEY TO GET MORE.: NEVER TRUE

## 2025-06-06 ASSESSMENT — ENCOUNTER SYMPTOMS
BLOOD IN STOOL: 0
NAUSEA: 1

## 2025-08-17 PROBLEM — I20.0 UNSTABLE ANGINA (HCC): Status: RESOLVED | Noted: 2019-07-25 | Resolved: 2025-08-17

## 2025-08-18 ENCOUNTER — OFFICE VISIT (OUTPATIENT)
Dept: CARDIOLOGY CLINIC | Age: 70
End: 2025-08-18
Payer: MEDICARE

## 2025-08-18 VITALS
WEIGHT: 315 LBS | OXYGEN SATURATION: 97 % | SYSTOLIC BLOOD PRESSURE: 138 MMHG | BODY MASS INDEX: 40.43 KG/M2 | HEIGHT: 74 IN | HEART RATE: 86 BPM | DIASTOLIC BLOOD PRESSURE: 86 MMHG

## 2025-08-18 DIAGNOSIS — I10 ESSENTIAL HYPERTENSION: ICD-10-CM

## 2025-08-18 DIAGNOSIS — I42.8 NONISCHEMIC CARDIOMYOPATHY (HCC): Primary | ICD-10-CM

## 2025-08-18 DIAGNOSIS — E78.2 MIXED HYPERLIPIDEMIA: ICD-10-CM

## 2025-08-18 DIAGNOSIS — I50.42 CHRONIC COMBINED SYSTOLIC AND DIASTOLIC CONGESTIVE HEART FAILURE (HCC): ICD-10-CM

## 2025-08-18 DIAGNOSIS — I48.21 PERMANENT ATRIAL FIBRILLATION (HCC): ICD-10-CM

## 2025-08-18 PROCEDURE — 1160F RVW MEDS BY RX/DR IN RCRD: CPT | Performed by: INTERNAL MEDICINE

## 2025-08-18 PROCEDURE — 1123F ACP DISCUSS/DSCN MKR DOCD: CPT | Performed by: INTERNAL MEDICINE

## 2025-08-18 PROCEDURE — G8417 CALC BMI ABV UP PARAM F/U: HCPCS | Performed by: INTERNAL MEDICINE

## 2025-08-18 PROCEDURE — 3017F COLORECTAL CA SCREEN DOC REV: CPT | Performed by: INTERNAL MEDICINE

## 2025-08-18 PROCEDURE — 1159F MED LIST DOCD IN RCRD: CPT | Performed by: INTERNAL MEDICINE

## 2025-08-18 PROCEDURE — 3075F SYST BP GE 130 - 139MM HG: CPT | Performed by: INTERNAL MEDICINE

## 2025-08-18 PROCEDURE — 99214 OFFICE O/P EST MOD 30 MIN: CPT | Performed by: INTERNAL MEDICINE

## 2025-08-18 PROCEDURE — G8427 DOCREV CUR MEDS BY ELIG CLIN: HCPCS | Performed by: INTERNAL MEDICINE

## 2025-08-18 PROCEDURE — 3079F DIAST BP 80-89 MM HG: CPT | Performed by: INTERNAL MEDICINE

## 2025-08-18 PROCEDURE — 1036F TOBACCO NON-USER: CPT | Performed by: INTERNAL MEDICINE

## 2025-08-18 RX ORDER — METOPROLOL SUCCINATE 50 MG/1
TABLET, EXTENDED RELEASE ORAL
Qty: 180 TABLET | Refills: 3 | Status: SHIPPED | OUTPATIENT
Start: 2025-08-18

## 2025-08-18 RX ORDER — FUROSEMIDE 40 MG/1
40 TABLET ORAL 2 TIMES DAILY
Qty: 180 TABLET | Refills: 3 | Status: SHIPPED | OUTPATIENT
Start: 2025-08-18

## 2025-08-18 RX ORDER — PRAVASTATIN SODIUM 40 MG
40 TABLET ORAL DAILY
Qty: 90 TABLET | Refills: 3 | Status: SHIPPED | OUTPATIENT
Start: 2025-08-18

## 2025-08-18 RX ORDER — HYDRALAZINE HYDROCHLORIDE 25 MG/1
12.5 TABLET, FILM COATED ORAL 2 TIMES DAILY
Qty: 180 TABLET | Refills: 3
Start: 2025-08-18

## 2025-08-18 RX ORDER — LISINOPRIL 20 MG/1
20 TABLET ORAL 2 TIMES DAILY
Qty: 180 TABLET | Refills: 3 | Status: SHIPPED | OUTPATIENT
Start: 2025-08-18

## 2025-08-18 RX ORDER — ISOSORBIDE MONONITRATE 30 MG/1
TABLET, EXTENDED RELEASE ORAL
Qty: 90 TABLET | Refills: 3 | Status: SHIPPED | OUTPATIENT
Start: 2025-08-18

## (undated) DEVICE — FORCEPS BX L240CM DIA2.4MM L NDL RAD JAW 4 133340

## (undated) DEVICE — Z DISCONTINUED USE 2276105 GOWN PROTCT UNIV CHST W28IN L49IN SL 24IN BLU SPUNBOND FLM

## (undated) DEVICE — ELECTRODE ECG MONITR FOAM TEAR DROP ADLT RED

## (undated) DEVICE — AIRLIFE™ NASAL OXYGEN CANNULA CURVED, NONFLARED TIP, WITH 7' FEET (2.1 M) CRUSH RESISTANT TUBING, OVER-THE-EAR STYLE: Brand: AIRLIFE™

## (undated) DEVICE — ENDO CARRY-ON PROCEDURE KIT INCLUDES SUCTION TUBING, LUBRICANT, GAUZE, BIOHAZARD STICKER, TRANSPORT PAD AND INTERCEPT BEDSIDE KIT.: Brand: ENDO CARRY-ON PROCEDURE KIT